# Patient Record
Sex: FEMALE | Race: WHITE | NOT HISPANIC OR LATINO | Employment: OTHER | URBAN - NONMETROPOLITAN AREA
[De-identification: names, ages, dates, MRNs, and addresses within clinical notes are randomized per-mention and may not be internally consistent; named-entity substitution may affect disease eponyms.]

---

## 2017-10-16 ENCOUNTER — TELEPHONE (OUTPATIENT)
Dept: CARDIOLOGY | Facility: CLINIC | Age: 57
End: 2017-10-16

## 2017-10-16 NOTE — TELEPHONE ENCOUNTER
Cardiac clearance req was received from the office of [facility]. Clearance was reviewed and signed and faxed back @ 0508 PM. -NAMRATA; JEREMIAS

## 2018-06-26 ENCOUNTER — OFFICE VISIT (OUTPATIENT)
Dept: CARDIAC SURGERY | Facility: CLINIC | Age: 58
End: 2018-06-26

## 2018-06-26 VITALS
BODY MASS INDEX: 28.64 KG/M2 | DIASTOLIC BLOOD PRESSURE: 63 MMHG | HEART RATE: 82 BPM | SYSTOLIC BLOOD PRESSURE: 148 MMHG | HEIGHT: 66 IN | OXYGEN SATURATION: 89 % | WEIGHT: 178.2 LBS

## 2018-06-26 DIAGNOSIS — I65.23 BILATERAL CAROTID ARTERY STENOSIS: Primary | ICD-10-CM

## 2018-06-26 PROCEDURE — 99203 OFFICE O/P NEW LOW 30 MIN: CPT | Performed by: THORACIC SURGERY (CARDIOTHORACIC VASCULAR SURGERY)

## 2019-06-11 ENCOUNTER — TELEPHONE (OUTPATIENT)
Dept: CARDIAC SURGERY | Facility: CLINIC | Age: 59
End: 2019-06-11

## 2019-06-11 NOTE — TELEPHONE ENCOUNTER
PT RECEIVED RECALL LETTER FOR 1 YR F/U WITH [facility] W/CAROTID DUPLEX. REQUESTED TO BE SEEN IN [place].

## 2019-06-18 DIAGNOSIS — I65.23 CAROTID STENOSIS, ASYMPTOMATIC, BILATERAL: Primary | ICD-10-CM

## 2019-07-23 ENCOUNTER — OFFICE VISIT (OUTPATIENT)
Dept: CARDIOLOGY | Facility: CLINIC | Age: 59
End: 2019-07-23

## 2019-07-23 VITALS
HEIGHT: 66 IN | BODY MASS INDEX: 28.64 KG/M2 | OXYGEN SATURATION: 94 % | SYSTOLIC BLOOD PRESSURE: 129 MMHG | DIASTOLIC BLOOD PRESSURE: 57 MMHG | HEART RATE: 78 BPM | WEIGHT: 178.2 LBS

## 2019-07-23 DIAGNOSIS — R07.9 CHEST PAIN, UNSPECIFIED TYPE: Primary | ICD-10-CM

## 2019-07-23 DIAGNOSIS — I25.10 CORONARY ARTERY DISEASE INVOLVING NATIVE CORONARY ARTERY OF NATIVE HEART WITHOUT ANGINA PECTORIS: ICD-10-CM

## 2019-07-23 DIAGNOSIS — R06.02 SOB (SHORTNESS OF BREATH): ICD-10-CM

## 2019-07-23 DIAGNOSIS — R00.2 PALPITATIONS: ICD-10-CM

## 2019-07-23 PROCEDURE — 99214 OFFICE O/P EST MOD 30 MIN: CPT | Performed by: PHYSICIAN ASSISTANT

## 2019-07-23 PROCEDURE — 93000 ELECTROCARDIOGRAM COMPLETE: CPT | Performed by: PHYSICIAN ASSISTANT

## 2019-07-23 RX ORDER — ALBUTEROL SULFATE 90 UG/1
2 AEROSOL, METERED RESPIRATORY (INHALATION) EVERY 6 HOURS PRN
COMMUNITY
Start: 2019-07-19

## 2019-07-23 NOTE — PROGRESS NOTES
Problem list     Subjective   Ira Stinson is a 59 y.o. female     Chief Complaint   Patient presents with   • Chest Pain     here for follow up   • Palpitations   • Shortness of Breath   • Edema       HPI        Problem List:  1. Coronary artery disease   1.1 History of CABG with follow-up stenting.  1.2 Recent catheterization, May 2014 with PTCA for IntraStent restenosis in the free radial to the RCA.  1.3. Repeat catheterization in July 2014 due to new 70-80% stenosis with angioplasty, subsequent dissection and stenting. Recommendations to consider single vessel bypass due to high grade stenosis  1.4 coronary artery bypass grafting with LIMA to LAD, vein graft to right coronary artery, vein graft to circumflex. Follow-up catheterization in 2014 demonstrated an occluded vein graft to RCA with stenting of the right coronary artery.  1.4 stress test September 2016 demonstrated posterolateral ischemia and preserved LV function  1.5 follow-up catheterization in November 2016 demonstrated high-grade disease involving the vein graft to the circumflex as well as the arterial graft to the PDA. There was high-grade disease of the native right coronary artery. Patient underwent stenting of the vein graft to circumflex, PTCA and stenting of the vein graft to the right coronary artery and stenting of the native right coronary artery.  2. Peripheral vascular disease with history of right carotid endarterectomy and follow-up right endarterectomy, March 2011.  2.1 The patient has residual left carotid stenosis, which was felt nonobstructive and not severe enough for mechanical intervention.  3. Chronic palpitations with previous monitoring indicating short-lived episodes of SVT and frequent PVCs.  4. Hypertension  5. Dyslipidemia  6. Hypothyroidism  7.  Mild aortic stenosis by echocardiogram 2016      Patient is a 59-year-old female who presents back to the office for follow-up.  We have not seen her in approximately 2 years.   Patient describes having chest pain that is occurred over the last several weeks.  She has felt a tightness and pressure.  She has started to develop symptoms similar to what she felt with previous stenting.  She describes carrying in groceries and she will become short of breath and feel discomfort.  She has progressive shortness of breath as described.  Her functional status has declined.  She denies PND orthopnea.    She will palpitate on occasion but no dizziness presyncope or syncope.  Otherwise is doing well    Outpatient Encounter Medications as of 7/23/2019   Medication Sig Dispense Refill   • ALPRAZolam (XANAX) 0.5 MG tablet Take  by mouth 2 (Two) Times a Day.     • atorvastatin (LIPITOR) 80 MG tablet Take  by mouth.     • budesonide-formoterol (SYMBICORT) 160-4.5 MCG/ACT inhaler Symbicort 160-4.5 MCG/ACT Inhalation Aerosol; Patient Sig: Symbicort 160-4.5 MCG/ACT Inhalation Aerosol 2 puffs daily; 1; 3; 12-Mar-2014; Active     • Cholecalciferol (VITAMIN D) 1000 UNITS tablet Take  by mouth daily.     • clopidogrel (PLAVIX) 75 MG tablet Take  by mouth daily.     • DULoxetine (CYMBALTA) 60 MG capsule Take  by mouth daily.     • ezetimibe (ZETIA) 10 MG tablet Take  by mouth.     • folic acid (FOLVITE) 800 MCG tablet Take  by mouth daily.     • furosemide (LASIX) 20 MG tablet Take  by mouth.     • isosorbide mononitrate (IMDUR) 30 MG 24 hr tablet Take 45 mg by mouth Daily.     • levothyroxine (SYNTHROID, LEVOTHROID) 25 MCG tablet Take  by mouth daily.     • metFORMIN (GLUCOPHAGE) 500 MG tablet Take 500 mg by mouth Daily With Breakfast.     • metoprolol succinate XL (TOPROL-XL) 50 MG 24 hr tablet Take  by mouth 2 (Two) Times a Day.     • nitroglycerin (NITROSTAT) 0.4 MG SL tablet Place  under the tongue.     • tiotropium (SPIRIVA HANDIHALER) 18 MCG per inhalation capsule daily.     • VENTOLIN  (90 Base) MCG/ACT inhaler As Needed.     • [DISCONTINUED] diphenhydrAMINE (BENADRYL) 25 MG tablet Take 1 tablet by  mouth Take As Directed. Take BID the day before the cath and AM of the cath. 3 tablet 0   • [DISCONTINUED] famotidine (PEPCID) 20 MG tablet Take 1 tablet by mouth Take As Directed. Take BID the day before the cath and AM of the cath. 3 tablet 0     No facility-administered encounter medications on file as of 7/23/2019.        Codeine; Contrast dye; Lortab [hydrocodone-acetaminophen]; Other; Percocet [oxycodone-acetaminophen]; and Penicillins    Past Medical History:   Diagnosis Date   • Anxiety    • Autoimmune disorder (CMS/Formerly [facility])    • Carotid artery stenosis    • Chest pain    • Coronary artery disease    • Depression    • Diabetes (CMS/[facility])    • Hyperlipidemia    • Hypertension    • Hypothyroidism    • Palpitations     with previous monitoring indicating short-lived episodes of SVT and frequent PVCs.   • Peripheral vascular disease (CMS/[facility])     with history of right carotid endarterectomy and follow-up right endarterectomy, March 2011.   • PVC (premature ventricular contraction)    • PVD (peripheral vascular disease) (CMS/[facility])    • Shortness of breath    • SVT (supraventricular tachycardia) (CMS/Former[facility])        Social History     Socioeconomic History   • Marital status:      Spouse name: Not on file   • Number of children: 2   • Years of education: Not on file   • Highest education level: Not on file   Occupational History   • Occupation: Nursing     Comment: disabled   Tobacco Use   • Smoking status: Current Every Day Smoker     Packs/day: 1.00     Years: 45.00     Pack years: 45.00     Types: Cigarettes   • Smokeless tobacco: Never Used   Substance and Sexual Activity   • Alcohol use: No   • Drug use: No   Social History Narrative    The patient lives with her daughter and  in [place].       Family History   Problem Relation Age of Onset   • Coronary artery disease Other    • Hyperlipidemia Other    • Hypertension Other    • Cancer Other    • Heart disease Mother    • Heart disease Father   "      Review of Systems   Constitutional: Positive for fatigue.   HENT: Negative.    Eyes: Positive for visual disturbance.   Respiratory: Positive for apnea (cpap), chest tightness, shortness of breath (with daily activitry) and wheezing.    Cardiovascular: Positive for chest pain, palpitations and leg swelling.   Gastrointestinal: Negative.    Endocrine: Negative.    Genitourinary: Negative.    Musculoskeletal: Positive for arthralgias, back pain, myalgias and neck pain.   Skin: Negative.    Allergic/Immunologic: Positive for environmental allergies.   Neurological: Negative.    Hematological: Bruises/bleeds easily.   Psychiatric/Behavioral: Positive for agitation. The patient is nervous/anxious.    All other systems reviewed and are negative.      Objective   Vitals:    07/23/19 1044   BP: 129/57   BP Location: Left arm   Patient Position: Sitting   Pulse: 78   SpO2: 94%   Weight: 80.8 kg (178 lb 3.2 oz)   Height: 167.6 cm (66\")      /57 (BP Location: Left arm, Patient Position: Sitting)   Pulse 78   Ht 167.6 cm (66\")   Wt 80.8 kg (178 lb 3.2 oz)   SpO2 94%   BMI 28.76 kg/m²     Lab Results (most recent)     None          Physical Exam   Constitutional: She is oriented to person, place, and time. She appears well-developed and well-nourished. No distress.   HENT:   Head: Normocephalic and atraumatic.   Eyes: EOM are normal. Pupils are equal, round, and reactive to light.   Neck: No JVD present. Carotid bruit is present.   Cardiovascular: Normal rate, regular rhythm, normal heart sounds and intact distal pulses. Exam reveals no gallop and no friction rub.   No murmur heard.  Grade 2/6 systolic ejection murmur right upper sternal border  Systolic murmur left upper sternal border   Pulmonary/Chest: Effort normal and breath sounds normal. No respiratory distress. She has no wheezes. She has no rales. She exhibits no tenderness.   Abdominal: There is no guarding.   Musculoskeletal: Normal range of motion. " She exhibits no edema.   Neurological: She is alert and oriented to person, place, and time. No cranial nerve deficit.   Skin: Skin is warm and dry. No rash noted. No erythema. No pallor.   Psychiatric: She has a normal mood and affect. Her behavior is normal.   Nursing note and vitals reviewed.      Procedure     ECG 12 Lead  Date/Time: 7/23/2019 10:51 AM  Performed by: Hardik Victor PA  Authorized by: Hardik Victor PA   Comparison: not compared with previous ECG   Comments: EKG demonstrates sinus rhythm at 69 bpm with nonspecific IVCD.  Possible septal wall margin determined with PVCs and n nonspecific ST-T wave changes               Assessment/Plan     Problems Addressed this Visit        Cardiovascular and Mediastinum    Coronary artery disease    Relevant Orders    Adult Transthoracic Echo Complete W/ Cont if Necessary Per Protocol    Stress Test With Myocardial Perfusion One Day    Palpitations    Relevant Orders    ECG 12 Lead    Adult Transthoracic Echo Complete W/ Cont if Necessary Per Protocol    Stress Test With Myocardial Perfusion One Day       Respiratory    SOB (shortness of breath)    Relevant Orders    ECG 12 Lead    Adult Transthoracic Echo Complete W/ Cont if Necessary Per Protocol    Stress Test With Myocardial Perfusion One Day       Nervous and Auditory    Chest pain - Primary    Relevant Orders    ECG 12 Lead    Adult Transthoracic Echo Complete W/ Cont if Necessary Per Protocol    Stress Test With Myocardial Perfusion One Day          Recommendation  1.  Patient with complaints of chest pain and dyspnea with coronary disease and no evaluation in approximately 3 years.  I feel further evaluation is warranted and patient will be scheduled for ischemia assessment.  2.  Stress test for risk stratification.  3.  Echocardiogram to evaluate LV structure and function and reevaluate aortic stenosis.  4.  Carotid stenosis is followed by vascular surgery.  5.  We will see her back for  follow-up on above testing.  Any chest pain or result by nitroglycerin, she is to go to the ER.  She will follow with primary as scheduled           Ira Stinson is a current cigarettes and smokeless tobacco user.  She currently smokes 1 pack of cigarettes per day for a duration of 25 years. I have educated her on the risk of diseases from using tobacco products such as cancer, COPD and heart diease.     I advised her to quit and she is not willing to quit.    I spent <3 minutes counseling the patient.         Patient's Body mass index is 28.76 kg/m². BMI is within normal parameters. No follow-up required..       Electronically signed by:

## 2019-08-14 ENCOUNTER — APPOINTMENT (OUTPATIENT)
Dept: CARDIOLOGY | Facility: HOSPITAL | Age: 59
End: 2019-08-14

## 2019-08-19 ENCOUNTER — HOSPITAL ENCOUNTER (OUTPATIENT)
Dept: CARDIOLOGY | Facility: HOSPITAL | Age: 59
Discharge: HOME | End: 2019-08-19

## 2019-08-19 VITALS — BODY MASS INDEX: 28.63 KG/M2 | HEIGHT: 66 IN | WEIGHT: 178.13 LBS

## 2019-08-19 DIAGNOSIS — I25.10 CORONARY ARTERY DISEASE INVOLVING NATIVE CORONARY ARTERY OF NATIVE HEART WITHOUT ANGINA PECTORIS: ICD-10-CM

## 2019-08-19 DIAGNOSIS — R00.2 PALPITATIONS: ICD-10-CM

## 2019-08-19 DIAGNOSIS — R07.9 CHEST PAIN, UNSPECIFIED TYPE: ICD-10-CM

## 2019-08-19 DIAGNOSIS — R06.02 SOB (SHORTNESS OF BREATH): ICD-10-CM

## 2019-08-19 PROCEDURE — A9500 TC99M SESTAMIBI: HCPCS | Performed by: INTERNAL MEDICINE

## 2019-08-19 PROCEDURE — 0 TECHNETIUM SESTAMIBI: Performed by: INTERNAL MEDICINE

## 2019-08-19 PROCEDURE — 93306 TTE W/DOPPLER COMPLETE: CPT

## 2019-08-19 PROCEDURE — 93017 CV STRESS TEST TRACING ONLY: CPT

## 2019-08-19 PROCEDURE — 78452 HT MUSCLE IMAGE SPECT MULT: CPT

## 2019-08-19 PROCEDURE — 78452 HT MUSCLE IMAGE SPECT MULT: CPT | Performed by: INTERNAL MEDICINE

## 2019-08-19 PROCEDURE — 25010000002 REGADENOSON 0.4 MG/5ML SOLUTION: Performed by: INTERNAL MEDICINE

## 2019-08-19 PROCEDURE — 93306 TTE W/DOPPLER COMPLETE: CPT | Performed by: INTERNAL MEDICINE

## 2019-08-19 PROCEDURE — 93018 CV STRESS TEST I&R ONLY: CPT | Performed by: INTERNAL MEDICINE

## 2019-08-19 RX ADMIN — REGADENOSON 0.4 MG: 0.08 INJECTION, SOLUTION INTRAVENOUS at 13:50

## 2019-08-19 RX ADMIN — TECHNETIUM TC 99M SESTAMIBI 1 DOSE: 1 INJECTION INTRAVENOUS at 13:49

## 2019-08-19 RX ADMIN — TECHNETIUM TC 99M SESTAMIBI 1 DOSE: 1 INJECTION INTRAVENOUS at 13:50

## 2019-08-21 LAB
BH CV STRESS COMMENTS STAGE 1: NORMAL
BH CV STRESS DOSE REGADENOSON STAGE 1: 0.4
BH CV STRESS DURATION MIN STAGE 1: 0
BH CV STRESS DURATION SEC STAGE 1: 10
BH CV STRESS PROTOCOL 1: NORMAL
BH CV STRESS RECOVERY BP: NORMAL MMHG
BH CV STRESS RECOVERY HR: 77 BPM
BH CV STRESS STAGE 1: 1
MAXIMAL PREDICTED HEART RATE: 159 BPM
PERCENT MAX PREDICTED HR: 50.94 %
STRESS BASELINE BP: NORMAL MMHG
STRESS BASELINE HR: 68 BPM
STRESS PERCENT HR: 60 %
STRESS POST PEAK BP: NORMAL MMHG
STRESS POST PEAK HR: 81 BPM
STRESS TARGET HR: 135 BPM

## 2019-08-26 ENCOUNTER — TELEPHONE (OUTPATIENT)
Dept: CARDIOLOGY | Facility: CLINIC | Age: 59
End: 2019-08-26

## 2019-08-26 LAB
BH CV ECHO MEAS - ACS: 1.5 CM
BH CV ECHO MEAS - AI DEC SLOPE: 188.1 CM/SEC^2
BH CV ECHO MEAS - AI DEC TIME: 1.2 SEC
BH CV ECHO MEAS - AI MAX PG: 21.9 MMHG
BH CV ECHO MEAS - AI MAX VEL: 233.9 CM/SEC
BH CV ECHO MEAS - AI P1/2T: 364.2 MSEC
BH CV ECHO MEAS - AO MAX PG (FULL): 16.5 MMHG
BH CV ECHO MEAS - AO MAX PG: 21.8 MMHG
BH CV ECHO MEAS - AO MEAN PG (FULL): 9.4 MMHG
BH CV ECHO MEAS - AO MEAN PG: 11.9 MMHG
BH CV ECHO MEAS - AO ROOT AREA (BSA CORRECTED): 1.9
BH CV ECHO MEAS - AO ROOT AREA: 10.4 CM^2
BH CV ECHO MEAS - AO ROOT DIAM: 3.6 CM
BH CV ECHO MEAS - AO V2 MAX: 233.6 CM/SEC
BH CV ECHO MEAS - AO V2 MEAN: 159 CM/SEC
BH CV ECHO MEAS - AO V2 VTI: 51.7 CM
BH CV ECHO MEAS - AVA(I,A): 1.5 CM^2
BH CV ECHO MEAS - AVA(I,D): 1.5 CM^2
BH CV ECHO MEAS - AVA(V,A): 1.5 CM^2
BH CV ECHO MEAS - AVA(V,D): 1.5 CM^2
BH CV ECHO MEAS - BSA(HAYCOCK): 2 M^2
BH CV ECHO MEAS - BSA: 1.9 M^2
BH CV ECHO MEAS - BZI_BMI: 28.7 KILOGRAMS/M^2
BH CV ECHO MEAS - BZI_METRIC_HEIGHT: 167.6 CM
BH CV ECHO MEAS - BZI_METRIC_WEIGHT: 80.7 KG
BH CV ECHO MEAS - CI(CUBED): 7.6 L/MIN/M^2
BH CV ECHO MEAS - CI(TEICH): 6.6 L/MIN/M^2
BH CV ECHO MEAS - CO(CUBED): 14.5 L/MIN
BH CV ECHO MEAS - CO(TEICH): 12.6 L/MIN
BH CV ECHO MEAS - EDV(CUBED): 112.1 ML
BH CV ECHO MEAS - EDV(TEICH): 108.7 ML
BH CV ECHO MEAS - EF(CUBED): 75.5 %
BH CV ECHO MEAS - EF(TEICH): 67.4 %
BH CV ECHO MEAS - ESV(CUBED): 27.4 ML
BH CV ECHO MEAS - ESV(TEICH): 35.5 ML
BH CV ECHO MEAS - FS: 37.5 %
BH CV ECHO MEAS - IVS/LVPW: 1.2
BH CV ECHO MEAS - IVSD: 1.3 CM
BH CV ECHO MEAS - LA DIMENSION(2D): 4.9 CM
BH CV ECHO MEAS - LA DIMENSION: 4.4 CM
BH CV ECHO MEAS - LA/AO: 1.2
BH CV ECHO MEAS - LAT PEAK E' VEL: 7 CM/SEC
BH CV ECHO MEAS - LV IVRT: 0.11 SEC
BH CV ECHO MEAS - LV MASS(C)D: 220.4 GRAMS
BH CV ECHO MEAS - LV MASS(C)DI: 115.8 GRAMS/M^2
BH CV ECHO MEAS - LV MAX PG: 5.3 MMHG
BH CV ECHO MEAS - LV MEAN PG: 2.4 MMHG
BH CV ECHO MEAS - LV V1 MAX: 115.2 CM/SEC
BH CV ECHO MEAS - LV V1 MEAN: 71.3 CM/SEC
BH CV ECHO MEAS - LV V1 VTI: 25.2 CM
BH CV ECHO MEAS - LVIDD: 4.8 CM
BH CV ECHO MEAS - LVIDS: 3 CM
BH CV ECHO MEAS - LVOT AREA: 3.1 CM^2
BH CV ECHO MEAS - LVOT DIAM: 2 CM
BH CV ECHO MEAS - LVPWD: 1.1 CM
BH CV ECHO MEAS - MED PEAK E' VEL: 4 CM/SEC
BH CV ECHO MEAS - MITRAL HR: 123.9 BPM
BH CV ECHO MEAS - MITRAL R-R: 0.48 SEC
BH CV ECHO MEAS - MM HR: 171.7 BPM
BH CV ECHO MEAS - MM R-R INT: 0.35 SEC
BH CV ECHO MEAS - MR MAX PG: 45.5 MMHG
BH CV ECHO MEAS - MR MAX VEL: 337.4 CM/SEC
BH CV ECHO MEAS - MV A MAX VEL: 91.1 CM/SEC
BH CV ECHO MEAS - MV DEC SLOPE: 538.6 CM/SEC^2
BH CV ECHO MEAS - MV DEC TIME: 0.18 SEC
BH CV ECHO MEAS - MV E MAX VEL: 98.1 CM/SEC
BH CV ECHO MEAS - MV E/A: 1.1
BH CV ECHO MEAS - MV MAX PG: 4 MMHG
BH CV ECHO MEAS - MV MEAN PG: 1.8 MMHG
BH CV ECHO MEAS - MV V2 MAX: 99.8 CM/SEC
BH CV ECHO MEAS - MV V2 MEAN: 63.4 CM/SEC
BH CV ECHO MEAS - MV V2 VTI: 30.7 CM
BH CV ECHO MEAS - MVA(VTI): 2.6 CM^2
BH CV ECHO MEAS - PA MAX PG (FULL): 1 MMHG
BH CV ECHO MEAS - PA MAX PG: 4.9 MMHG
BH CV ECHO MEAS - PA MEAN PG (FULL): 0.8 MMHG
BH CV ECHO MEAS - PA MEAN PG: 2.9 MMHG
BH CV ECHO MEAS - PA V2 MAX: 111 CM/SEC
BH CV ECHO MEAS - PA V2 MEAN: 80.3 CM/SEC
BH CV ECHO MEAS - PA V2 VTI: 22.8 CM
BH CV ECHO MEAS - PI END-D VEL: 163.1 CM/SEC
BH CV ECHO MEAS - PULM. HR: 211.5 BPM
BH CV ECHO MEAS - PULM. R-R: 0.28 SEC
BH CV ECHO MEAS - RAP SYSTOLE: 10 MMHG
BH CV ECHO MEAS - RV MAX PG: 3.9 MMHG
BH CV ECHO MEAS - RV MEAN PG: 2.1 MMHG
BH CV ECHO MEAS - RV V1 MAX: 98.7 CM/SEC
BH CV ECHO MEAS - RV V1 MEAN: 68.1 CM/SEC
BH CV ECHO MEAS - RV V1 VTI: 22.8 CM
BH CV ECHO MEAS - RVDD: 3.6 CM
BH CV ECHO MEAS - RVSP: 56 MMHG
BH CV ECHO MEAS - SI(AO): 281.7 ML/M^2
BH CV ECHO MEAS - SI(CUBED): 44.5 ML/M^2
BH CV ECHO MEAS - SI(LVOT): 41.4 ML/M^2
BH CV ECHO MEAS - SI(TEICH): 38.5 ML/M^2
BH CV ECHO MEAS - SV(AO): 536.3 ML
BH CV ECHO MEAS - SV(CUBED): 84.7 ML
BH CV ECHO MEAS - SV(LVOT): 78.7 ML
BH CV ECHO MEAS - SV(TEICH): 73.2 ML
BH CV ECHO MEAS - TR MAX VEL: 292.2 CM/SEC
BH CV ECHO MEASUREMENTS AVERAGE E/E' RATIO: 17.84
MAXIMAL PREDICTED HEART RATE: 159 BPM
STRESS TARGET HR: 135 BPM

## 2019-08-26 NOTE — TELEPHONE ENCOUNTER
Phones out. Echo to be addressed on follow up scheduled 9/4/19.    ----- Message from JANNA Davies sent at 8/22/2019  3:39 PM EDT -----  1-2 month follow up

## 2019-08-27 ENCOUNTER — TELEPHONE (OUTPATIENT)
Dept: CARDIOLOGY | Facility: CLINIC | Age: 59
End: 2019-08-27

## 2019-08-27 NOTE — TELEPHONE ENCOUNTER
Echo to be addressed on follow up appointment scheduled for 9/4/19. Dina Astorga MA         ----- Message from JANNA Davies sent at 8/27/2019  2:24 PM EDT -----  Routine errol solorio

## 2019-09-04 ENCOUNTER — OFFICE VISIT (OUTPATIENT)
Dept: CARDIOLOGY | Facility: CLINIC | Age: 59
End: 2019-09-04

## 2019-09-04 VITALS
WEIGHT: 176 LBS | HEART RATE: 82 BPM | BODY MASS INDEX: 28.28 KG/M2 | DIASTOLIC BLOOD PRESSURE: 62 MMHG | HEIGHT: 66 IN | SYSTOLIC BLOOD PRESSURE: 112 MMHG | RESPIRATION RATE: 16 BRPM | OXYGEN SATURATION: 91 %

## 2019-09-04 DIAGNOSIS — R07.9 CHEST PAIN, UNSPECIFIED TYPE: ICD-10-CM

## 2019-09-04 DIAGNOSIS — I25.10 CORONARY ARTERY DISEASE INVOLVING NATIVE CORONARY ARTERY OF NATIVE HEART WITHOUT ANGINA PECTORIS: ICD-10-CM

## 2019-09-04 DIAGNOSIS — R06.02 SHORTNESS OF BREATH: Primary | ICD-10-CM

## 2019-09-04 PROCEDURE — 99214 OFFICE O/P EST MOD 30 MIN: CPT | Performed by: PHYSICIAN ASSISTANT

## 2019-09-04 RX ORDER — DIPHENHYDRAMINE HCL 25 MG
TABLET ORAL
Qty: 3 TABLET | Refills: 0 | Status: SHIPPED | OUTPATIENT
Start: 2019-09-04 | End: 2019-10-28

## 2019-09-04 RX ORDER — FAMOTIDINE 20 MG/1
TABLET, FILM COATED ORAL
Qty: 3 TABLET | Refills: 0 | Status: SHIPPED | OUTPATIENT
Start: 2019-09-04 | End: 2019-10-28

## 2019-09-04 RX ORDER — PREDNISONE 20 MG/1
TABLET ORAL
Qty: 3 TABLET | Refills: 0 | Status: SHIPPED | OUTPATIENT
Start: 2019-09-04 | End: 2019-10-28

## 2019-09-04 RX ORDER — METOPROLOL SUCCINATE 50 MG/1
50 TABLET, EXTENDED RELEASE ORAL DAILY
Qty: 30 TABLET | Refills: 6 | Status: SHIPPED | OUTPATIENT
Start: 2019-09-04 | End: 2019-11-27

## 2019-09-04 NOTE — PATIENT INSTRUCTIONS
Steps to Quit Smoking    Smoking tobacco can be harmful to your health and can affect almost every organ in your body. Smoking puts you, and those around you, at risk for developing many serious chronic diseases. Quitting smoking is difficult, but it is one of the best things that you can do for your health. It is never too late to quit.  What are the benefits of quitting smoking?  When you quit smoking, you lower your risk of developing serious diseases and conditions, such as:  · Lung cancer or lung disease, such as COPD.  · Heart disease.  · Stroke.  · Heart attack.  · Infertility.  · Osteoporosis and bone fractures.  Additionally, symptoms such as coughing, wheezing, and shortness of breath may get better when you quit. You may also find that you get sick less often because your body is stronger at fighting off colds and infections. If you are pregnant, quitting smoking can help to reduce your chances of having a baby of low birth weight.  How do I get ready to quit?  When you decide to quit smoking, create a plan to make sure that you are successful. Before you quit:  · Pick a date to quit. Set a date within the next two weeks to give you time to prepare.  · Write down the reasons why you are quitting. Keep this list in places where you will see it often, such as on your bathroom mirror or in your car or wallet.  · Identify the people, places, things, and activities that make you want to smoke (triggers) and avoid them. Make sure to take these actions:  ? Throw away all cigarettes at home, at work, and in your car.  ? Throw away smoking accessories, such as ashtrays and lighters.  ? Clean your car and make sure to empty the ashtray.  ? Clean your home, including curtains and carpets.  · Tell your family, friends, and coworkers that you are quitting. Support from your loved ones can make quitting easier.  · Talk with your health care provider about your options for quitting smoking.  · Find out what treatment  options are covered by your health insurance.  What strategies can I use to quit smoking?  Talk with your healthcare provider about different strategies to quit smoking. Some strategies include:  · Quitting smoking altogether instead of gradually lessening how much you smoke over a period of time. Research shows that quitting “cold turkey” is more successful than gradually quitting.  · Attending in-person counseling to help you build problem-solving skills. You are more likely to have success in quitting if you attend several counseling sessions. Even short sessions of 10 minutes can be effective.  · Finding resources and support systems that can help you to quit smoking and remain smoke-free after you quit. These resources are most helpful when you use them often. They can include:  ? Online chats with a counselor.  ? Telephone quitlines.  ? Printed self-help materials.  ? Support groups or group counseling.  ? Text messaging programs.  ? Mobile phone applications.  · Taking medicines to help you quit smoking. (If you are pregnant or breastfeeding, talk with your health care provider first.) Some medicines contain nicotine and some do not. Both types of medicines help with cravings, but the medicines that include nicotine help to relieve withdrawal symptoms. Your health care provider may recommend:  ? Nicotine patches, gum, or lozenges.  ? Nicotine inhalers or sprays.  ? Non-nicotine medicine that is taken by mouth.  Talk with your health care provider about combining strategies, such as taking medicines while you are also receiving in-person counseling. Using these two strategies together makes you more likely to succeed in quitting than if you used either strategy on its own.  If you are pregnant or breastfeeding, talk with your health care provider about finding counseling or other support strategies to quit smoking. Do not take medicine to help you quit smoking unless told to do so by your health care  provider.  What things can I do to make it easier to quit?  Quitting smoking might feel overwhelming at first, but there is a lot that you can do to make it easier. Take these important actions:  · Reach out to your family and friends and ask that they support and encourage you during this time. Call telephone quitlines, reach out to support groups, or work with a counselor for support.  · Ask people who smoke to avoid smoking around you.  · Avoid places that trigger you to smoke, such as bars, parties, or smoke-break areas at work.  · Spend time around people who do not smoke.  · Lessen stress in your life, because stress can be a smoking trigger for some people. To lessen stress, try:  ? Exercising regularly.  ? Deep-breathing exercises.  ? Yoga.  ? Meditating.  ? Performing a body scan. This involves closing your eyes, scanning your body from head to toe, and noticing which parts of your body are particularly tense. Purposefully relax the muscles in those areas.  · Download or purchase mobile phone or tablet apps (applications) that can help you stick to your quit plan by providing reminders, tips, and encouragement. There are many free apps, such as QuitGuide from the CDC (Centers for Disease Control and Prevention). You can find other support for quitting smoking (smoking cessation) through smokefree.gov and other websites.  How will I feel when I quit smoking?  Within the first 24 hours of quitting smoking, you may start to feel some withdrawal symptoms. These symptoms are usually most noticeable 2-3 days after quitting, but they usually do not last beyond 2-3 weeks. Changes or symptoms that you might experience include:  · Mood swings.  · Restlessness, anxiety, or irritation.  · Difficulty concentrating.  · Dizziness.  · Strong cravings for sugary foods in addition to nicotine.  · Mild weight gain.  · Constipation.  · Nausea.  · Coughing or a sore throat.  · Changes in how your medicines work in your  body.  · A depressed mood.  · Difficulty sleeping (insomnia).  After the first 2-3 weeks of quitting, you may start to notice more positive results, such as:  · Improved sense of smell and taste.  · Decreased coughing and sore throat.  · Slower heart rate.  · Lower blood pressure.  · Clearer skin.  · The ability to breathe more easily.  · Fewer sick days.  Quitting smoking is very challenging for most people. Do not get discouraged if you are not successful the first time. Some people need to make many attempts to quit before they achieve long-term success. Do your best to stick to your quit plan, and talk with your health care provider if you have any questions or concerns.  This information is not intended to replace advice given to you by your health care provider. Make sure you discuss any questions you have with your health care provider.  Document Released: 12/12/2002 Document Revised: 07/24/2018 Document Reviewed: 05/03/2016  [employer] Interactive Patient Education © 2019 [employer]  Fat and Cholesterol Restricted Eating Plan  Getting too much fat and cholesterol in your diet may cause health problems. Choosing the right foods helps keep your fat and cholesterol at normal levels. This can keep you from getting certain diseases.  Your doctor may recommend an eating plan that includes:  · Total fat: ______% or less of total calories a day.  · Saturated fat: ______% or less of total calories a day.  · Cholesterol: less than _________mg a day.  · Fiber: ______g a day.  What are tips for following this plan?  General tips    · Work with your doctor to lose weight if you need to.  · Avoid:  ? Foods with added sugar.  ? Fried foods.  ? Foods with partially hydrogenated oils.  · Limit alcohol intake to no more than 1 drink a day for nonpregnant women and 2 drinks a day for men. One drink equals 12 oz of beer, 5 oz of wine, or 1½ oz of hard liquor.  Reading food labels  · Check food labels for:  ? Trans  "fats.  ? Partially hydrogenated oils.  ? Saturated fat (g) in each serving.  ? Cholesterol (mg) in each serving.  ? Fiber (g) in each serving.  · Choose foods with healthy fats, such as:  ? Monounsaturated fats.  ? Polyunsaturated fats.  ? Omega-3 fats.  · Choose grain products that have whole grains. Look for the word \"whole\" as the first word in the ingredient list.  Cooking  · Cook foods using low-fat methods. These include baking, boiling, grilling, and broiling.  · Eat more home-cooked foods. Eat at restaurants and buffets less often.  · Avoid cooking using saturated fats, such as butter, cream, palm oil, palm kernel oil, and coconut oil.  Meal planning    · At meals, divide your plate into four equal parts:  ? Fill one-half of your plate with vegetables and green salads.  ? Fill one-fourth of your plate with whole grains.  ? Fill one-fourth of your plate with low-fat (lean) protein foods.  · Eat fish that is high in omega-3 fats at least two times a week. This includes mackerel, tuna, sardines, and salmon.  · Eat foods that are high in fiber, such as whole grains, beans, apples, broccoli, carrots, peas, and barley.  Recommended foods  Grains  · Whole grains, such as whole wheat or whole grain breads, crackers, cereals, and pasta. Unsweetened oatmeal, bulgur, barley, quinoa, or brown rice. Corn or whole wheat flour tortillas.  Vegetables  · Fresh or frozen vegetables (raw, steamed, roasted, or grilled). Green salads.  Fruits  · All fresh, canned (in natural juice), or frozen fruits.  Meats and other protein foods  · Ground beef (85% or leaner), grass-fed beef, or beef trimmed of fat. Skinless chicken or turkey. Ground chicken or turkey. Pork trimmed of fat. All fish and seafood. Egg whites. Dried beans, peas, or lentils. Unsalted nuts or seeds. Unsalted canned beans. Nut butters without added sugar or oil.  Dairy  · Low-fat or nonfat dairy products, such as skim or 1% milk, 2% or reduced-fat cheeses, low-fat " and fat-free ricotta or cottage cheese, or plain low-fat and nonfat yogurt.  Fats and oils  · Tub margarine without trans fats. Light or reduced-fat mayonnaise and salad dressings. Avocado. Olive, canola, sesame, or safflower oils.  The items listed above may not be a complete list of recommended foods or beverages. Contact your dietitian for more options.  The items listed above may not be a complete list of foods and beverages [you/your child] can eat. Contact a dietitian for more information.  Foods to avoid  Grains  · White bread. White pasta. White rice. Cornbread. Bagels, pastries, and croissants. Crackers and snack foods that contain trans fat and hydrogenated oils.  Vegetables  · Vegetables cooked in cheese, cream, or butter sauce. Fried vegetables.  Fruits  · Canned fruit in heavy syrup. Fruit in cream or butter sauce. Fried fruit.  Meats and other protein foods  · Fatty cuts of meat. Ribs, chicken wings, clifford, sausage, bologna, salami, chitterlings, fatback, hot dogs, bratwurst, and packaged lunch meats. Liver and organ meats. Whole eggs and egg yolks. Chicken and turkey with skin. Fried meat.  Dairy  · Whole or 2% milk, cream, half-and-half, and cream cheese. Whole milk cheeses. Whole-fat or sweetened yogurt. Full-fat cheeses. Nondairy creamers and whipped toppings. Processed cheese, cheese spreads, and cheese curds.  Beverages  · Alcohol. Sugar-sweetened drinks such as sodas, lemonade, and fruit drinks.  Fats and oils  · Butter, stick margarine, lard, shortening, ghee, or clifford fat. Coconut, palm kernel, and palm oils.  Sweets and desserts  · Corn syrup, sugars, honey, and molasses. Candy. Jam and jelly. Syrup. Sweetened cereals. Cookies, pies, cakes, donuts, muffins, and ice cream.  The items listed above may not be a complete list of foods and beverages to avoid. Contact your dietitian for more information.  The items listed above may not be a complete list of foods and beverages [you/your child]  "should avoid. Contact a dietitian for more information.  Summary  · Choosing the right foods helps keep your fat and cholesterol at normal levels. This can keep you from getting certain diseases.  · At meals, fill one-half of your plate with vegetables and green salads.  · Eat high-fiber foods, like whole grains, beans, apples, carrots, peas, and barley.  · Limit added sugar, saturated fats, alcohol, and fried foods.  This information is not intended to replace advice given to you by your health care provider. Make sure you discuss any questions you have with your health care provider.  Document Released: 06/18/2013 Document Revised: 09/04/2018 Document Reviewed: 09/04/2018  [employer] Interactive Patient Education © 2019 [employer]  BMI for Adults    Body mass index (BMI) is a number that is calculated from a person's weight and height. BMI may help to estimate how much of a person's weight is composed of fat. BMI can help identify those who may be at higher risk for certain medical problems.  How is BMI used with adults?  BMI is used as a screening tool to identify possible weight problems. It is used to check whether a person is obese, overweight, healthy weight, or underweight.  How is BMI calculated?  BMI measures your weight and compares it to your height. This can be done either in English ([place] or metric measurements. Note that charts are available to help you find your BMI quickly and easily without having to do these calculations yourself.  To calculate your BMI in English ([place] measurements, your health care provider will:  1. Measure your weight in pounds (lb).  2. Multiply the number of pounds by 703.  ? For example, for a person who weighs 180 lb, multiply that number by 703, which equals 126,540.  3. Measure your height in inches (in). Then multiply that number by itself to get a measurement called \"inches squared.\"  ? For example, for a person who is 70 in tall, the \"inches squared\" measurement is " "70 in x 70 in, which equals 4900 inches squared.  4. Divide the total from Step 2 (number of lb x 703) by the total from Step 3 (inches squared): 126,540 ÷ 4900 = 25.8. This is your BMI.  To calculate your BMI in metric measurements, your health care provider will:  1. Measure your weight in kilograms (kg).  2. Measure your height in meters (m). Then multiply that number by itself to get a measurement called \"meters squared.\"  ? For example, for a person who is 1.75 m tall, the \"meters squared\" measurement is 1.75 m x 1.75 m, which is equal to 3.1 meters squared.  3. Divide the number of kilograms (your weight) by the meters squared number. In this example: 70 ÷ 3.1 = 22.6. This is your BMI.  How is BMI interpreted?  To interpret your results, your health care provider will use BMI charts to identify whether you are underweight, normal weight, overweight, or obese. The following guidelines will be used:  · Underweight: BMI less than 18.5.  · Normal weight: BMI between 18.5 and 24.9.  · Overweight: BMI between 25 and 29.9.  · Obese: BMI of 30 and above.  Please note:  · Weight includes both fat and muscle, so someone with a muscular build, such as an athlete, may have a BMI that is higher than 24.9. In cases like these, BMI is not an accurate measure of body fat.  · To determine if excess body fat is the cause of a BMI of 25 or higher, further assessments may need to be done by a health care provider.  · BMI is usually interpreted in the same way for men and women.  Why is BMI a useful tool?  BMI is useful in two ways:  · Identifying a weight problem that may be related to a medical condition, or that may increase the risk for medical problems.  · Promoting lifestyle and diet changes in order to reach a healthy weight.  Summary  · Body mass index (BMI) is a number that is calculated from a person's weight and height.  · BMI may help to estimate how much of a person's weight is composed of fat. BMI can help identify " those who may be at higher risk for certain medical problems.  · BMI can be measured using English measurements or metric measurements.  · To interpret your results, your health care provider will use BMI charts to identify whether you are underweight, normal weight, overweight, or obese.  This information is not intended to replace advice given to you by your health care provider. Make sure you discuss any questions you have with your health care provider.  Document Released: 08/29/2005 Document Revised: 10/31/2018 Document Reviewed: 10/31/2018  [employer] Interactive Patient Education © 2019 [employer]

## 2019-09-04 NOTE — PROGRESS NOTES
Problem list     Subjective   Ira Stinson is a 59 y.o. female     Chief Complaint   Patient presents with   • Chest Pain     Follow up test results   • Palpitations   • Shortness of Breath     relates to bronchitis and seasonal allergies       HPI    Problem List:  1. Coronary artery disease   1.1 History of CABG with follow-up stenting.  1.2 Recent catheterization, May 2014 with PTCA for IntraStent restenosis in the free radial to the RCA.  1.3. Repeat catheterization in July 2014 due to new 70-80% stenosis with angioplasty, subsequent dissection and stenting. Recommendations to consider single vessel bypass due to high grade stenosis  1.4 coronary artery bypass grafting with LIMA to LAD, vein graft to right coronary artery, vein graft to circumflex. Follow-up catheterization in 2014 demonstrated an occluded vein graft to RCA with stenting of the right coronary artery.  1.4 stress test September 2016 demonstrated posterolateral ischemia and preserved LV function  1.5 follow-up catheterization in November 2016 demonstrated high-grade disease involving the vein graft to the circumflex as well as the arterial graft to the PDA. There was high-grade disease of the native right coronary artery. Patient underwent stenting of the vein graft to circumflex, PTCA and stenting of the vein graft to the right coronary artery and stenting of the native right coronary artery.  1.6 stress test August 2019 suggest no evidence of ischemia preserved LV function  1.7 continued pain on antianginal therapy  2. Peripheral vascular disease with history of right carotid endarterectomy and follow-up right endarterectomy, March 2011.  2.1 The patient has residual left carotid stenosis, which was felt nonobstructive and not severe enough for mechanical intervention.  3. Chronic palpitations with previous monitoring indicating short-lived episodes of SVT and frequent PVCs.  4. Hypertension  5. Dyslipidemia  6. Hypothyroidism  7.  Mild aortic  stenosis by echocardiogram 2016      Patient is a 59-year-old female who presents back to the office for follow-up.  Patient has history of coronary disease.  She has history of coronary artery bypass grafting and last catheterization was in 2016 in which she underwent stenting of the vein graft to the circumflex, vein graft RCA and the RCA.  Patient has history of recurrent stenosis.  Recently she presented to the office complaining of significant chest and neck pain.  She had not been evaluated in quite some time and underwent testing here at the office which for the most part is been unremarkable.  However, she continues to have symptoms despite being on 2 antianginal medications.    Patient is very cognizant of her angina.  She describes feeling symptoms and would have catheterization and ended up having revascularization performed.  The pain that she is experiencing now is similar to what she felt back then.  She describes a squeezing sensation in her neck.  She describes a pressure in her chest.  This occurs at random and at rest.  This occurs despite being on 2 antianginal medications.  She has shortness of breath as well.  She notices this when trying to exert.  She has underlying lung disease.  She denies PND orthopnea.    She does not complain of any palpitations or dysrhythmic symptoms.and otherwise diong well.         Outpatient Encounter Medications as of 9/4/2019   Medication Sig Dispense Refill   • ALPRAZolam (XANAX) 0.5 MG tablet Take  by mouth 2 (Two) Times a Day.     • atorvastatin (LIPITOR) 80 MG tablet Take  by mouth.     • budesonide-formoterol (SYMBICORT) 160-4.5 MCG/ACT inhaler Symbicort 160-4.5 MCG/ACT Inhalation Aerosol; Patient Sig: Symbicort 160-4.5 MCG/ACT Inhalation Aerosol 2 puffs daily; 1; 3; 12-Mar-2014; Active     • Cholecalciferol (VITAMIN D) 1000 UNITS tablet Take  by mouth daily.     • clopidogrel (PLAVIX) 75 MG tablet Take  by mouth daily.     • DULoxetine (CYMBALTA) 60 MG  capsule Take  by mouth daily.     • ezetimibe (ZETIA) 10 MG tablet Take  by mouth.     • folic acid (FOLVITE) 800 MCG tablet Take  by mouth daily.     • furosemide (LASIX) 20 MG tablet Take  by mouth.     • isosorbide mononitrate (IMDUR) 30 MG 24 hr tablet Take 45 mg by mouth Daily.     • levothyroxine (SYNTHROID, LEVOTHROID) 25 MCG tablet Take  by mouth daily.     • metFORMIN (GLUCOPHAGE) 500 MG tablet Take 500 mg by mouth Daily With Breakfast.     • metoprolol succinate XL (TOPROL-XL) 50 MG 24 hr tablet Take 1 tablet by mouth Daily. 30 tablet 6   • nitroglycerin (NITROSTAT) 0.4 MG SL tablet Place  under the tongue.     • tiotropium (SPIRIVA HANDIHALER) 18 MCG per inhalation capsule daily.     • VENTOLIN  (90 Base) MCG/ACT inhaler As Needed.     • [DISCONTINUED] metoprolol succinate XL (TOPROL-XL) 50 MG 24 hr tablet Take  by mouth 2 (Two) Times a Day.     • aspirin 81 MG tablet Take 1 tablet by mouth Daily. 30 tablet 11     No facility-administered encounter medications on file as of 9/4/2019.        Codeine; Contrast dye; Lortab [hydrocodone-acetaminophen]; Other; Percocet [oxycodone-acetaminophen]; and Penicillins    Past Medical History:   Diagnosis Date   • Anxiety    • Autoimmune disorder (CMS/HCC)    • Carotid artery stenosis    • Chest pain    • Coronary artery disease    • Depression    • Diabetes (CMS/HCC)    • Hyperlipidemia    • Hypertension    • Hypothyroidism    • Palpitations     with previous monitoring indicating short-lived episodes of SVT and frequent PVCs.   • Peripheral vascular disease (CMS/HCC)     with history of right carotid endarterectomy and follow-up right endarterectomy, March 2011.   • PVC (premature ventricular contraction)    • PVD (peripheral vascular disease) (CMS/HCC)    • Shortness of breath    • SVT (supraventricular tachycardia) (CMS/HCC)        Social History     Socioeconomic History   • Marital status:      Spouse name: Not on file   • Number of children: 2   •  "Years of education: Not on file   • Highest education level: Not on file   Occupational History   • Occupation: Nursing     Comment: disabled   Tobacco Use   • Smoking status: Current Every Day Smoker     Packs/day: 0.25     Years: 45.00     Pack years: 11.25     Types: Cigarettes   • Smokeless tobacco: Never Used   Substance and Sexual Activity   • Alcohol use: No   • Drug use: No   • Sexual activity: Defer   Social History Narrative    The patient lives with her daughter and  in [place].       Family History   Problem Relation Age of Onset   • Coronary artery disease Other    • Hyperlipidemia Other    • Hypertension Other    • Cancer Other    • Heart disease Mother    • Heart disease Father        Review of Systems   Constitutional: Negative for activity change, appetite change and fatigue.   HENT: Negative.    Eyes: Negative for photophobia and visual disturbance.   Respiratory: Positive for shortness of breath (bronchitis, seasonal allergies). Negative for chest tightness.    Cardiovascular: Positive for chest pain. Negative for palpitations and leg swelling.   Gastrointestinal: Negative.    Endocrine: Negative for cold intolerance and heat intolerance.   Genitourinary: Negative.    Musculoskeletal: Positive for arthralgias, back pain, joint swelling and myalgias.   Skin: Negative for color change and rash.   Allergic/Immunologic: Negative for environmental allergies and food allergies.   Neurological: Negative for dizziness, syncope, weakness and light-headedness.   Hematological: Does not bruise/bleed easily.   Psychiatric/Behavioral: Negative.    All other systems reviewed and are negative.      Objective   Vitals:    09/04/19 1017   BP: 112/62   BP Location: Right arm   Patient Position: Sitting   Pulse: 82   Resp: 16   SpO2: 91%   Weight: 79.8 kg (176 lb)   Height: 167 cm (65.75\")      /62 (BP Location: Right arm, Patient Position: Sitting)   Pulse 82   Resp 16   Ht 167 cm (65.75\")   Wt " 79.8 kg (176 lb)   SpO2 91%   BMI 28.63 kg/m²     Lab Results (most recent)     None          Physical Exam   Constitutional: She is oriented to person, place, and time. She appears well-developed and well-nourished. No distress.   HENT:   Head: Normocephalic and atraumatic.   Eyes: EOM are normal. Pupils are equal, round, and reactive to light.   Neck: No JVD present.   Cardiovascular: Normal rate, regular rhythm, normal heart sounds and intact distal pulses. Exam reveals no gallop and no friction rub.   No murmur heard.  Pulmonary/Chest: Effort normal and breath sounds normal. No respiratory distress. She has no wheezes. She has no rales. She exhibits no tenderness.   Musculoskeletal: Normal range of motion. She exhibits no edema.   Neurological: She is alert and oriented to person, place, and time. No cranial nerve deficit.   Skin: Skin is warm and dry. No rash noted. No erythema. No pallor.   Psychiatric: She has a normal mood and affect. Her behavior is normal.   Nursing note and vitals reviewed.      Procedure   Procedures       Assessment/Plan     Problems Addressed this Visit        Cardiovascular and Mediastinum    Coronary artery disease    Relevant Medications    metoprolol succinate XL (TOPROL-XL) 50 MG 24 hr tablet    Other Relevant Orders    [facility]    CBC & Differential    Comprehensive Metabolic Panel       Respiratory    Shortness of breath - Primary    Relevant Orders    [facility]    CBC & Differential    Comprehensive Metabolic Panel       Nervous and Auditory    Chest pain    Relevant Orders    [facility]    CBC & Differential    Comprehensive Metabolic Panel            Recommendation  1.  Patient with continued symptoms despite antianginal therapy.  She is currently on 2 medications.  She describes a chest discomfort with a squeezing neck pain.  This is similar to what she felt with previous stenting.  Despite the fact that she had negative stress test, she  continues to have symptoms on medical therapy with history of coronary disease.  Therefore cardiac catheterization will be scheduled.  2.  Patient will be continued on medicines and will be placed on dual antiplatelet therapy.  Any chest pain not resolved with nitroglycerin, she is to go to the ER.  She will follow with primary as scheduled         Patient's Body mass index is 28.63 kg/m². BMI is above normal parameters. Recommendations include: educational material.       Electronically signed by:

## 2019-09-25 ENCOUNTER — TELEPHONE (OUTPATIENT)
Dept: CARDIOLOGY | Facility: CLINIC | Age: 59
End: 2019-09-25

## 2019-09-25 NOTE — TELEPHONE ENCOUNTER
Called patient regarding pre-cath labs. She stated she has not had them drawn yet however she will tomorrow. Dina Astorga MA

## 2019-09-26 ENCOUNTER — LAB (OUTPATIENT)
Dept: LAB | Facility: HOSPITAL | Age: 59
End: 2019-09-26

## 2019-09-26 DIAGNOSIS — R06.02 SHORTNESS OF BREATH: ICD-10-CM

## 2019-09-26 DIAGNOSIS — R07.9 CHEST PAIN, UNSPECIFIED TYPE: ICD-10-CM

## 2019-09-26 DIAGNOSIS — I25.10 CORONARY ARTERY DISEASE INVOLVING NATIVE CORONARY ARTERY OF NATIVE HEART WITHOUT ANGINA PECTORIS: ICD-10-CM

## 2019-09-26 PROCEDURE — 80053 COMPREHEN METABOLIC PANEL: CPT | Performed by: PHYSICIAN ASSISTANT

## 2019-09-26 PROCEDURE — 85025 COMPLETE CBC W/AUTO DIFF WBC: CPT | Performed by: PHYSICIAN ASSISTANT

## 2019-09-26 PROCEDURE — 36415 COLL VENOUS BLD VENIPUNCTURE: CPT

## 2019-09-27 LAB
ALBUMIN SERPL-MCNC: 4.44 G/DL (ref 3.5–5.2)
ALBUMIN/GLOB SERPL: 1.5 G/DL
ALP SERPL-CCNC: 80 U/L (ref 39–117)
ALT SERPL W P-5'-P-CCNC: 22 U/L (ref 1–33)
ANION GAP SERPL CALCULATED.3IONS-SCNC: 16.8 MMOL/L (ref 5–15)
AST SERPL-CCNC: 24 U/L (ref 1–32)
BASOPHILS # BLD AUTO: 0.06 10*3/MM3 (ref 0–0.2)
BASOPHILS NFR BLD AUTO: 0.7 % (ref 0–1.5)
BILIRUB SERPL-MCNC: 0.3 MG/DL (ref 0.2–1.2)
BUN BLD-MCNC: 12 MG/DL (ref 8–23)
BUN/CREAT SERPL: 20 (ref 7–25)
CALCIUM SPEC-SCNC: 9.8 MG/DL (ref 8.6–10.5)
CHLORIDE SERPL-SCNC: 97 MMOL/L (ref 98–107)
CO2 SERPL-SCNC: 25.2 MMOL/L (ref 22–29)
CREAT BLD-MCNC: 0.6 MG/DL (ref 0.57–1)
DEPRECATED RDW RBC AUTO: 59.6 FL (ref 37–54)
EOSINOPHIL # BLD AUTO: 0.3 10*3/MM3 (ref 0–0.4)
EOSINOPHIL NFR BLD AUTO: 3.5 % (ref 0.3–6.2)
ERYTHROCYTE [DISTWIDTH] IN BLOOD BY AUTOMATED COUNT: 18 % (ref 12.3–15.4)
GFR SERPL CREATININE-BSD FRML MDRD: 102 ML/MIN/1.73
GLOBULIN UR ELPH-MCNC: 3 GM/DL
GLUCOSE BLD-MCNC: 126 MG/DL (ref 65–99)
HCT VFR BLD AUTO: 42.6 % (ref 34–46.6)
HGB BLD-MCNC: 12.8 G/DL (ref 12–15.9)
IMM GRANULOCYTES # BLD AUTO: 0.02 10*3/MM3 (ref 0–0.05)
IMM GRANULOCYTES NFR BLD AUTO: 0.2 % (ref 0–0.5)
LYMPHOCYTES # BLD AUTO: 1.28 10*3/MM3 (ref 0.7–3.1)
LYMPHOCYTES NFR BLD AUTO: 14.8 % (ref 19.6–45.3)
MCH RBC QN AUTO: 27.5 PG (ref 26.6–33)
MCHC RBC AUTO-ENTMCNC: 30 G/DL (ref 31.5–35.7)
MCV RBC AUTO: 91.6 FL (ref 79–97)
MONOCYTES # BLD AUTO: 0.58 10*3/MM3 (ref 0.1–0.9)
MONOCYTES NFR BLD AUTO: 6.7 % (ref 5–12)
NEUTROPHILS # BLD AUTO: 6.41 10*3/MM3 (ref 1.7–7)
NEUTROPHILS NFR BLD AUTO: 74.1 % (ref 42.7–76)
NRBC BLD AUTO-RTO: 0 /100 WBC (ref 0–0.2)
PLATELET # BLD AUTO: 290 10*3/MM3 (ref 140–450)
PMV BLD AUTO: 12.5 FL (ref 6–12)
POTASSIUM BLD-SCNC: 4.8 MMOL/L (ref 3.5–5.2)
PROT SERPL-MCNC: 7.4 G/DL (ref 6–8.5)
RBC # BLD AUTO: 4.65 10*6/MM3 (ref 3.77–5.28)
SODIUM BLD-SCNC: 139 MMOL/L (ref 136–145)
WBC NRBC COR # BLD: 8.65 10*3/MM3 (ref 3.4–10.8)

## 2019-10-07 ENCOUNTER — OUTSIDE FACILITY SERVICE (OUTPATIENT)
Dept: CARDIOLOGY | Facility: CLINIC | Age: 59
End: 2019-10-07

## 2019-10-07 PROCEDURE — 92978 ENDOLUMINL IVUS OCT C 1ST: CPT | Performed by: INTERNAL MEDICINE

## 2019-10-07 PROCEDURE — 93459 L HRT ART/GRFT ANGIO: CPT | Performed by: INTERNAL MEDICINE

## 2019-10-07 PROCEDURE — 92937 PRQ TRLUML REVSC CAB GRF 1: CPT | Performed by: INTERNAL MEDICINE

## 2019-10-07 PROCEDURE — 92938 PR PRQ TRLUML CORONARY BYP GRFT REVASC ADDL VESSEL: CPT | Performed by: INTERNAL MEDICINE

## 2019-10-08 ENCOUNTER — OUTSIDE FACILITY SERVICE (OUTPATIENT)
Dept: CARDIOLOGY | Facility: CLINIC | Age: 59
End: 2019-10-08

## 2019-10-08 DIAGNOSIS — R06.02 SHORTNESS OF BREATH: ICD-10-CM

## 2019-10-08 DIAGNOSIS — R07.9 CHEST PAIN, UNSPECIFIED TYPE: ICD-10-CM

## 2019-10-08 DIAGNOSIS — I25.10 CORONARY ARTERY DISEASE INVOLVING NATIVE CORONARY ARTERY OF NATIVE HEART WITHOUT ANGINA PECTORIS: ICD-10-CM

## 2019-10-08 PROCEDURE — 99217 PR OBSERVATION CARE DISCHARGE MANAGEMENT: CPT | Performed by: NURSE PRACTITIONER

## 2019-10-09 ENCOUNTER — TELEPHONE (OUTPATIENT)
Dept: CARDIOLOGY | Facility: CLINIC | Age: 59
End: 2019-10-09

## 2019-10-09 NOTE — TELEPHONE ENCOUNTER
Patient called stating she is having active chest pain and pressure in the center of her chest starting this morning. She had [facility] on 10/7/19 with 2 stents. She has had previous stents and never experienced this feeling.  Verbal order per Hardik Victor PA-C for patient to proceed to the ER for evaluation. Patient verbalized understanding. Dina Astorga MA

## 2019-10-14 ENCOUNTER — TELEPHONE (OUTPATIENT)
Dept: CARDIOLOGY | Facility: CLINIC | Age: 59
End: 2019-10-14

## 2019-10-14 NOTE — TELEPHONE ENCOUNTER
Patient scheduled cath follow up on 10/28/19. Dina Astorga MA    ----- Message from JANNA Davies sent at 10/14/2019 11:31 AM EDT -----  1 to 2-week follow-up

## 2019-10-22 ENCOUNTER — OFFICE VISIT (OUTPATIENT)
Dept: CARDIAC SURGERY | Facility: CLINIC | Age: 59
End: 2019-10-22

## 2019-10-22 VITALS
DIASTOLIC BLOOD PRESSURE: 72 MMHG | SYSTOLIC BLOOD PRESSURE: 132 MMHG | BODY MASS INDEX: 27.94 KG/M2 | WEIGHT: 178 LBS | HEART RATE: 72 BPM | OXYGEN SATURATION: 98 % | HEIGHT: 67 IN

## 2019-10-22 DIAGNOSIS — I65.23 CAROTID STENOSIS, ASYMPTOMATIC, BILATERAL: Primary | ICD-10-CM

## 2019-10-22 PROCEDURE — 99212 OFFICE O/P EST SF 10 MIN: CPT | Performed by: PHYSICIAN ASSISTANT

## 2019-10-22 RX ORDER — TIOTROPIUM BROMIDE INHALATION SPRAY 3.12 UG/1
2 SPRAY, METERED RESPIRATORY (INHALATION)
Refills: 3 | COMMUNITY
Start: 2019-09-03 | End: 2022-04-26 | Stop reason: ALTCHOICE

## 2019-10-22 RX ORDER — LANSOPRAZOLE 15 MG/1
15 CAPSULE, DELAYED RELEASE ORAL DAILY
Refills: 2 | COMMUNITY
Start: 2019-08-17 | End: 2019-11-27

## 2019-10-22 RX ORDER — ALPRAZOLAM 1 MG/1
1 TABLET ORAL 2 TIMES DAILY
Refills: 1 | COMMUNITY
Start: 2019-10-16

## 2019-10-22 RX ORDER — DOXYCYCLINE 100 MG/1
100 CAPSULE ORAL 2 TIMES DAILY
Refills: 0 | COMMUNITY
Start: 2019-09-01 | End: 2019-10-28

## 2019-10-22 RX ORDER — ISOSORBIDE DINITRATE 30 MG/1
TABLET ORAL
Refills: 2 | COMMUNITY
Start: 2019-10-11 | End: 2020-08-06

## 2019-10-22 RX ORDER — METFORMIN HYDROCHLORIDE 500 MG/1
500 TABLET, EXTENDED RELEASE ORAL DAILY
Refills: 2 | COMMUNITY
Start: 2019-09-12

## 2019-10-22 NOTE — PROGRESS NOTES
10/22/2019  Patient Information  Ira Stinson                                                                                          [address] [place] [address]   1960  'PCP/Referring Physician'  Ernesto Whitfield MD  [phone]  No ref. provider found    Chief Complaint   Patient presents with   • Follow-up     1 yr. F/u w/ carotid duplex - carotid artery stenosis       History of Present Illness:  Mrs. Stinson is a 58-year-old female with a history of hypertension, hyperlipidemia, COPD, diabetes, coronary disease status post CABG x 3 and active tobacco abuse who presents to office today for 1 year follow-up of her known carotid artery disease.  She is status post right carotid endarterectomy with Dr. Sharif on 3/14/11.  Ms Stinson denies vision loss, speaking difficulty or weakness.  She has no new complaints today and is otherwise doing well.    Patient Active Problem List   Diagnosis   • Coronary artery disease   • Peripheral vascular disease (CMS/HCC)   • Palpitations   • Hypertension   • Hypothyroidism   • Dyslipidemia   • Anxiety   • Carotid artery stenosis   • Chest pain   • Depression   • PVC (premature ventricular contraction)   • SVT (supraventricular tachycardia) (CMS/HCC)   • Shortness of breath     Past Medical History:   Diagnosis Date   • Anxiety    • Autoimmune disorder (CMS/HCC)    • Carotid artery stenosis    • Chest pain    • COPD (chronic obstructive pulmonary disease) (CMS/HCC)    • Coronary artery disease    • Depression    • Diabetes (CMS/HCC)    • Hyperlipidemia    • Hypertension    • Hypothyroidism    • Palpitations     with previous monitoring indicating short-lived episodes of SVT and frequent PVCs.   • Peripheral vascular disease (CMS/HCC)     with history of right carotid endarterectomy and follow-up right endarterectomy, March 2011.   • PVC (premature ventricular contraction)    • PVD (peripheral vascular disease) (CMS/HCC)    • Shortness of breath    • SVT  (supraventricular tachycardia) (CMS/HCC)      Past Surgical History:   Procedure Laterality Date   • CAROTID ENDARTERECTOMY Right 03/14/2011    Dr. Sharif   • CATARACT EXTRACTION, BILATERAL     • CORONARY ARTERY BYPASS GRAFT      X3   • CORONARY STENT PLACEMENT     • HERNIA REPAIR      Umbilical Hernia Repair X2   • TUBAL ABDOMINAL LIGATION         Current Outpatient Medications:   •  ALPRAZolam (XANAX) 0.5 MG tablet, Take  by mouth 2 (Two) Times a Day., Disp: , Rfl:   •  ALPRAZolam (XANAX) 1 MG tablet, Take 1 mg by mouth 2 (Two) Times a Day., Disp: , Rfl: 1  •  aspirin 81 MG tablet, Take 1 tablet by mouth Daily., Disp: 30 tablet, Rfl: 11  •  atorvastatin (LIPITOR) 80 MG tablet, Take  by mouth., Disp: , Rfl:   •  budesonide-formoterol (SYMBICORT) 160-4.5 MCG/ACT inhaler, Symbicort 160-4.5 MCG/ACT Inhalation Aerosol; Patient Sig: Symbicort 160-4.5 MCG/ACT Inhalation Aerosol 2 puffs daily; 1; 3; 12-Mar-2014; Active, Disp: , Rfl:   •  Cholecalciferol (VITAMIN D) 1000 UNITS tablet, Take  by mouth daily., Disp: , Rfl:   •  clopidogrel (PLAVIX) 75 MG tablet, Take  by mouth daily., Disp: , Rfl:   •  diphenhydrAMINE (BENADRYL ALLERGY) 25 MG tablet, Patient to take one tablet bid the day before cath and one tablet the morning of cath, Disp: 3 tablet, Rfl: 0  •  doxycycline (MONODOX) 100 MG capsule, Take 100 mg by mouth 2 (Two) Times a Day., Disp: , Rfl: 0  •  DULoxetine (CYMBALTA) 60 MG capsule, Take  by mouth daily., Disp: , Rfl:   •  ezetimibe (ZETIA) 10 MG tablet, Take  by mouth., Disp: , Rfl:   •  famotidine (PEPCID) 20 MG tablet, Take one tablet bid the day before cath and one tablet the morning of cath, Disp: 3 tablet, Rfl: 0  •  folic acid (FOLVITE) 800 MCG tablet, Take  by mouth daily., Disp: , Rfl:   •  furosemide (LASIX) 20 MG tablet, Take  by mouth., Disp: , Rfl:   •  isosorbide dinitrate (ISORDIL) 30 MG tablet, TAKE 1 TABLET BY MOUTH IN THE MORNING AND 1 2 (ONE HALF) TABLET IN THE EVENING, Disp: , Rfl: 2  •   isosorbide mononitrate (IMDUR) 30 MG 24 hr tablet, Take 45 mg by mouth Daily., Disp: , Rfl:   •  lansoprazole (PREVACID) 15 MG capsule, Take 15 mg by mouth Daily., Disp: , Rfl: 2  •  levothyroxine (SYNTHROID, LEVOTHROID) 25 MCG tablet, Take  by mouth daily., Disp: , Rfl:   •  metFORMIN (GLUCOPHAGE) 500 MG tablet, Take 500 mg by mouth Daily With Breakfast., Disp: , Rfl:   •  metFORMIN ER (GLUCOPHAGE-XR) 500 MG 24 hr tablet, Take 500 mg by mouth Daily., Disp: , Rfl: 2  •  metoprolol succinate XL (TOPROL-XL) 50 MG 24 hr tablet, Take 1 tablet by mouth Daily., Disp: 30 tablet, Rfl: 6  •  nitroglycerin (NITROSTAT) 0.4 MG SL tablet, Place  under the tongue., Disp: , Rfl:   •  predniSONE (DELTASONE) 20 MG tablet, Take one tablet bid the day before cath and one tablet the morning of cath, Disp: 3 tablet, Rfl: 0  •  SPIRIVA RESPIMAT 2.5 MCG/ACT aerosol solution inhaler, INHALE 2 SPRAY(S) BY MOUTH ONCE DAILY, Disp: , Rfl: 3  •  tiotropium (SPIRIVA HANDIHALER) 18 MCG per inhalation capsule, daily., Disp: , Rfl:   •  VENTOLIN  (90 Base) MCG/ACT inhaler, As Needed., Disp: , Rfl:   Allergies   Allergen Reactions   • Acetaminophen Irritability   • Codeine GI Intolerance   • Contrast Dye Hives and Other (See Comments)     IVP DYE. Blisters on skin    • Hydrocodone Bitartrate Irritability   • Lortab [Hydrocodone-Acetaminophen] Itching and GI Intolerance     TABS   • Other      Darvocet-N 100 TABS   • Oxycodone Hcl Confusion   • Percocet [Oxycodone-Acetaminophen] GI Intolerance   • Penicillins Rash     Social History     Socioeconomic History   • Marital status:      Spouse name: Not on file   • Number of children: 2   • Years of education: Not on file   • Highest education level: Not on file   Occupational History   • Occupation: Nursing     Comment: disabled   Tobacco Use   • Smoking status: Current Every Day Smoker     Packs/day: 0.25     Years: 45.00     Pack years: 11.25     Types: Cigarettes   • Smokeless tobacco:  "Never Used   Substance and Sexual Activity   • Alcohol use: No   • Drug use: No   • Sexual activity: Defer   Social History Narrative    The patient lives with her daughter and  in [place].     Family History   Problem Relation Age of Onset   • Coronary artery disease Other    • Hyperlipidemia Other    • Hypertension Other    • Cancer Other    • Heart disease Mother    • Heart disease Father      Review of Systems   Constitution: Positive for malaise/fatigue. Negative for chills, fever, night sweats and weight loss.   HENT: Positive for congestion. Negative for hearing loss, nosebleeds and odynophagia.    Cardiovascular: Positive for dyspnea on exertion and palpitations. Negative for chest pain, claudication, leg swelling, orthopnea and syncope.   Respiratory: Positive for cough and shortness of breath. Negative for hemoptysis and wheezing.    Endocrine: Negative for cold intolerance, heat intolerance, polydipsia, polyphagia and polyuria.   Hematologic/Lymphatic: Bruises/bleeds easily.   Skin: Negative for itching, poor wound healing and rash.   Musculoskeletal: Positive for back pain (lower back pain), joint pain (bilateral hands pain ), joint swelling and muscle cramps (lower extremities, legs and toes cramping). Negative for arthritis and myalgias.   Gastrointestinal: Negative for abdominal pain, constipation, diarrhea, hematemesis, melena, nausea and vomiting.   Genitourinary: Positive for nocturia. Negative for dysuria, frequency, hematuria and urgency.   Neurological: Positive for dizziness and light-headedness. Negative for loss of balance and numbness.   Psychiatric/Behavioral: Positive for depression. Negative for suicidal ideas. The patient is nervous/anxious.    Allergic/Immunologic: Positive for environmental allergies. Negative for HIV exposure.     Vitals:    10/22/19 1136   BP: 132/72   Pulse: 72   SpO2: 98%   Weight: 80.7 kg (178 lb)   Height: 170.2 cm (67\")      Physical Exam  Gen - NAD, " pleasant, cooperative  CV -regular rate and rhythm, no murmur gallop or rub  Pulm -lungs clear to auscultation bilateral without wheeze or rhonchi  GI -soft, normoactive bowel sounds, nontender  Ext - without edema.   Incision -healing well, no evidence of incisional dehiscence or cellulitis  Neuro - CN II - XII grossly intact, tongue midline, voice normal    Labs/Imaging:  Outside carotid duplex US   Impression: 50-69% stenosis within proximal right carotid artery    Assessment/Plan:  Mrs. Stinson is a 58-year-old female with a history of hypertension, hyperlipidemia, COPD, diabetes, coronary disease status post CABG x 3 and active tobacco abuse who presents to office today for 1 year follow-up of her known carotid artery disease.  Carotid duplex ultrasound results discussed in detail with the patient and all questions were answered to her satisfaction.  This time we will plan to see the patient back again in 1 year with repeat carotid ultrasound for continued surveillance.  She can call our office with any questions or concerns should any arise during the interval.    Patient Active Problem List   Diagnosis   • Coronary artery disease   • Peripheral vascular disease (CMS/HCC)   • Palpitations   • Hypertension   • Hypothyroidism   • Dyslipidemia   • Anxiety   • Carotid artery stenosis   • Chest pain   • Depression   • PVC (premature ventricular contraction)   • SVT (supraventricular tachycardia) (CMS/HCC)   • Shortness of breath

## 2019-10-28 ENCOUNTER — OFFICE VISIT (OUTPATIENT)
Dept: CARDIOLOGY | Facility: CLINIC | Age: 59
End: 2019-10-28

## 2019-10-28 VITALS
SYSTOLIC BLOOD PRESSURE: 122 MMHG | HEART RATE: 100 BPM | WEIGHT: 175 LBS | DIASTOLIC BLOOD PRESSURE: 65 MMHG | HEIGHT: 66 IN | OXYGEN SATURATION: 89 % | BODY MASS INDEX: 28.12 KG/M2

## 2019-10-28 DIAGNOSIS — R06.02 SHORTNESS OF BREATH: Primary | ICD-10-CM

## 2019-10-28 DIAGNOSIS — I65.29 STENOSIS OF CAROTID ARTERY, UNSPECIFIED LATERALITY: ICD-10-CM

## 2019-10-28 DIAGNOSIS — R09.89 BILATERAL CAROTID BRUITS: ICD-10-CM

## 2019-10-28 DIAGNOSIS — E78.00 PURE HYPERCHOLESTEROLEMIA: ICD-10-CM

## 2019-10-28 DIAGNOSIS — I25.10 CORONARY ARTERY DISEASE INVOLVING NATIVE CORONARY ARTERY OF NATIVE HEART WITHOUT ANGINA PECTORIS: ICD-10-CM

## 2019-10-28 DIAGNOSIS — R07.9 CHEST PAIN, UNSPECIFIED TYPE: ICD-10-CM

## 2019-10-28 PROCEDURE — 99214 OFFICE O/P EST MOD 30 MIN: CPT | Performed by: PHYSICIAN ASSISTANT

## 2019-10-28 NOTE — PROGRESS NOTES
Problem list     Subjective   Ira Stinson is a 59 y.o. female     Chief Complaint   Patient presents with   • Coronary Artery Disease     Here for a follow up on heart cath    • Chest Pain   • Shortness of Breath     Problem List:  1. Coronary artery disease   1.1 History of CABG with follow-up stenting.  1.2 Recent catheterization, May 2014 with PTCA for IntraStent restenosis in the free radial to the RCA.  1.3. Repeat catheterization in July 2014 due to new 70-80% stenosis with angioplasty, subsequent dissection and stenting. Recommendations to consider single vessel bypass due to high grade stenosis  1.4 coronary artery bypass grafting with LIMA to LAD, vein graft to right coronary artery, vein graft to circumflex. Follow-up catheterization in 2014 demonstrated an occluded vein graft to RCA with stenting of the right coronary artery.  1.4 stress test September 2016 demonstrated posterolateral ischemia and preserved LV function  1.5 follow-up catheterization in November 2016 demonstrated high-grade disease involving the vein graft to the circumflex as well as the arterial graft to the PDA. There was high-grade disease of the native right coronary artery. Patient underwent stenting of the vein graft to circumflex, PTCA and stenting of the vein graft to the right coronary artery and stenting of the native right coronary artery.  1.6 stress test August 2019 suggest no evidence of ischemia preserved LV function  1.7 continued pain on antianginal therapy  1.8 cardiac catheterization October 2019 demonstrated high-grade RCA disease.  There was subtotal IntraStent restenosis as well as distal disease.  Patient underwent stenting x2.  Vein graft to circumflex was patent as well as LIMA.  Occluded native circumflex.  Medical management recommended  2. Peripheral vascular disease with history of right carotid endarterectomy and follow-up right endarterectomy, March 2011.  2.1 The patient has residual left carotid  stenosis, which was felt nonobstructive and not severe enough for mechanical intervention.  2.2 carotid duplex demonstrated 50 to 70% proximal right internal carotid artery stenosis.  3. Chronic palpitations with previous monitoring indicating short-lived episodes of SVT and frequent PVCs.  4. Hypertension  5. Dyslipidemia  6. Hypothyroidism  7.  Mild aortic stenosis by echocardiogram 2016      HPI    Patient is a 59-year-old female who presents back to the office for follow-up.  She had cardiac catheterization with stenting.  Patient describes to me that she feels better.  She is not as near as severely dyspneic as she would been in the past.  She feels much better.  She still has occasional sharp discomfort.  No progressive pain.  No PND orthopnea.    She does not palpitated of dysrhythmic symptoms.  She otherwise is doing well    Current Outpatient Medications on File Prior to Visit   Medication Sig Dispense Refill   • ALPRAZolam (XANAX) 0.5 MG tablet Take  by mouth 2 (Two) Times a Day.     • ALPRAZolam (XANAX) 1 MG tablet Take 1 mg by mouth 2 (Two) Times a Day.  1   • aspirin 81 MG tablet Take 1 tablet by mouth Daily. 30 tablet 11   • atorvastatin (LIPITOR) 80 MG tablet Take  by mouth.     • budesonide-formoterol (SYMBICORT) 160-4.5 MCG/ACT inhaler Symbicort 160-4.5 MCG/ACT Inhalation Aerosol; Patient Sig: Symbicort 160-4.5 MCG/ACT Inhalation Aerosol 2 puffs daily; 1; 3; 12-Mar-2014; Active     • Cholecalciferol (VITAMIN D) 1000 UNITS tablet Take  by mouth daily.     • clopidogrel (PLAVIX) 75 MG tablet Take  by mouth daily.     • DULoxetine (CYMBALTA) 60 MG capsule Take  by mouth daily.     • ezetimibe (ZETIA) 10 MG tablet Take  by mouth.     • folic acid (FOLVITE) 800 MCG tablet Take  by mouth daily.     • furosemide (LASIX) 20 MG tablet Take  by mouth.     • isosorbide dinitrate (ISORDIL) 30 MG tablet TAKE 1 TABLET BY MOUTH IN THE MORNING AND 1 2 (ONE HALF) TABLET IN THE EVENING  2   • isosorbide mononitrate  (IMDUR) 30 MG 24 hr tablet Take 45 mg by mouth Daily.     • lansoprazole (PREVACID) 15 MG capsule Take 15 mg by mouth Daily.  2   • levothyroxine (SYNTHROID, LEVOTHROID) 25 MCG tablet Take  by mouth daily.     • metFORMIN (GLUCOPHAGE) 500 MG tablet Take 500 mg by mouth Daily With Breakfast.     • metFORMIN ER (GLUCOPHAGE-XR) 500 MG 24 hr tablet Take 500 mg by mouth Daily.  2   • metoprolol succinate XL (TOPROL-XL) 50 MG 24 hr tablet Take 1 tablet by mouth Daily. 30 tablet 6   • nitroglycerin (NITROSTAT) 0.4 MG SL tablet Place  under the tongue.     • SPIRIVA RESPIMAT 2.5 MCG/ACT aerosol solution inhaler INHALE 2 SPRAY(S) BY MOUTH ONCE DAILY  3   • tiotropium (SPIRIVA HANDIHALER) 18 MCG per inhalation capsule daily.     • VENTOLIN  (90 Base) MCG/ACT inhaler As Needed.     • [DISCONTINUED] diphenhydrAMINE (BENADRYL ALLERGY) 25 MG tablet Patient to take one tablet bid the day before cath and one tablet the morning of cath 3 tablet 0   • [DISCONTINUED] doxycycline (MONODOX) 100 MG capsule Take 100 mg by mouth 2 (Two) Times a Day.  0   • [DISCONTINUED] famotidine (PEPCID) 20 MG tablet Take one tablet bid the day before cath and one tablet the morning of cath 3 tablet 0   • [DISCONTINUED] predniSONE (DELTASONE) 20 MG tablet Take one tablet bid the day before cath and one tablet the morning of cath 3 tablet 0     No current facility-administered medications on file prior to visit.        Acetaminophen; Codeine; Contrast dye; Hydrocodone bitartrate; Lortab [hydrocodone-acetaminophen]; Other; Oxycodone hcl; Percocet [oxycodone-acetaminophen]; and Penicillins    Past Medical History:   Diagnosis Date   • Anxiety    • Autoimmune disorder (CMS/HCC)    • Carotid artery stenosis    • Chest pain    • COPD (chronic obstructive pulmonary disease) (CMS/HCC)    • Coronary artery disease    • Depression    • Diabetes (CMS/HCC)    • Hyperlipidemia    • Hypertension    • Hypothyroidism    • Palpitations     with previous  monitoring indicating short-lived episodes of SVT and frequent PVCs.   • Peripheral vascular disease (CMS/HCC)     with history of right carotid endarterectomy and follow-up right endarterectomy, March 2011.   • PVC (premature ventricular contraction)    • PVD (peripheral vascular disease) (CMS/HCC)    • Shortness of breath    • SVT (supraventricular tachycardia) (CMS/HCC)        Social History     Socioeconomic History   • Marital status:      Spouse name: Not on file   • Number of children: 2   • Years of education: Not on file   • Highest education level: Not on file   Occupational History   • Occupation: Nursing     Comment: disabled   Tobacco Use   • Smoking status: Current Every Day Smoker     Packs/day: 0.25     Years: 45.00     Pack years: 11.25     Types: Cigarettes   • Smokeless tobacco: Never Used   Substance and Sexual Activity   • Alcohol use: No   • Drug use: No   • Sexual activity: Defer   Social History Narrative    The patient lives with her daughter and  in [place].       Family History   Problem Relation Age of Onset   • Coronary artery disease Other    • Hyperlipidemia Other    • Hypertension Other    • Cancer Other    • Heart disease Mother    • Heart disease Father        Review of Systems   Constitutional: Negative.  Negative for chills, fatigue and fever.   HENT: Positive for congestion.    Eyes: Positive for visual disturbance (reading glasses ).   Respiratory: Positive for apnea (uses cpap nightly with oxygen also ) and shortness of breath (stays short of air ). Negative for chest tightness.    Cardiovascular: Positive for palpitations (Racing, palps ). Negative for chest pain and leg swelling.   Gastrointestinal: Negative.    Endocrine: Positive for heat intolerance (stays hot ). Negative for cold intolerance.   Genitourinary: Negative.    Musculoskeletal: Positive for arthralgias (joints ), back pain (low back pain ) and myalgias (having a lot of muscle pain, cramping in  "legs. Wants to discuss cholesterol meds. ).   Skin: Negative.  Negative for rash and wound.   Allergic/Immunologic: Negative.  Negative for environmental allergies and food allergies.   Neurological: Positive for light-headedness (when standing too fast ). Negative for dizziness and weakness.   Hematological: Bruises/bleeds easily (bruises easily ).   Psychiatric/Behavioral: Negative.  Negative for sleep disturbance (denies waking with smothering ).   All other systems reviewed and are negative.      Objective   Vitals:    10/28/19 1306   BP: 122/65   BP Location: Left arm   Patient Position: Sitting   Pulse: 100   SpO2: (!) 89%   Weight: 79.4 kg (175 lb)   Height: 167.6 cm (66\")      /65 (BP Location: Left arm, Patient Position: Sitting)   Pulse 100   Ht 167.6 cm (66\")   Wt 79.4 kg (175 lb)   SpO2 (!) 89%   BMI 28.25 kg/m²     Lab Results (most recent)     None          Physical Exam   Constitutional: She is oriented to person, place, and time. She appears well-developed and well-nourished. No distress.   HENT:   Head: Normocephalic and atraumatic.   Eyes: EOM are normal. Pupils are equal, round, and reactive to light.   Neck: No JVD present. Carotid bruit is present.   Cardiovascular: Normal rate, regular rhythm, normal heart sounds and intact distal pulses. Exam reveals no gallop and no friction rub.   No murmur heard.  Pulmonary/Chest: Effort normal and breath sounds normal. No respiratory distress. She has no wheezes. She has no rales. She exhibits no tenderness.   Musculoskeletal: Normal range of motion. She exhibits no edema.   Neurological: She is alert and oriented to person, place, and time. No cranial nerve deficit.   Skin: Skin is warm and dry. No rash noted. No erythema. No pallor.   Psychiatric: She has a normal mood and affect. Her behavior is normal.   Nursing note and vitals reviewed.      Procedure   Procedures       Assessment/Plan     Problems Addressed this Visit        Cardiovascular " and Mediastinum    Coronary artery disease    Stenosis of carotid artery    Relevant Orders    CT Angiogram Carotids    Pure hypercholesterolemia    Relevant Medications    Alirocumab (PRALUENT) 150 MG/ML solution auto-injector    Bilateral carotid bruits    Relevant Orders    CT Angiogram Carotids       Respiratory    Shortness of breath - Primary       Nervous and Auditory    Chest pain          Recommendation  1.  Patient doing well.  Her symptoms have improved significantly.  Patient feels much better.  She would like to continue medical management.  2.  I would like to get her approved for Praluent.  Patient is on high-dose statin and Zetia.  She has myalgias and she would like alternative therapy.  3.  Patient with bilateral carotid bruits and carotid disease as well.  I am scheduling for CTA based on patient's carotid duplex findings.  4.  We will see her back for follow-up after testing.  She will follow with primary as scheduled           Ira Stinson is a current cigarettes user.  She currently smokes 1 pack of cigarettes per day for a duration of 43 years. I have educated her on the risk of diseases from using tobacco products such as cancer, COPD and heart diease.     I advised her to quit and she is not willing to quit.       Patient's Body mass index is 28.25 kg/m². BMI is above normal parameters. Recommendations include: educational material and referral to primary care.       Electronically signed by:

## 2019-10-28 NOTE — PATIENT INSTRUCTIONS
Steps to Quit Smoking    Smoking tobacco can be bad for your health. It can also affect almost every organ in your body. Smoking puts you and people around you at risk for many serious long-lasting (chronic) diseases. Quitting smoking is hard, but it is one of the best things that you can do for your health. It is never too late to quit.  What are the benefits of quitting smoking?  When you quit smoking, you lower your risk for getting serious diseases and conditions. They can include:  · Lung cancer or lung disease.  · Heart disease.  · Stroke.  · Heart attack.  · Not being able to have children (infertility).  · Weak bones (osteoporosis) and broken bones (fractures).  If you have coughing, wheezing, and shortness of breath, those symptoms may get better when you quit. You may also get sick less often. If you are pregnant, quitting smoking can help to lower your chances of having a baby of low birth weight.  What can I do to help me quit smoking?  Talk with your doctor about what can help you quit smoking. Some things you can do (strategies) include:  · Quitting smoking totally, instead of slowly cutting back how much you smoke over a period of time.  · Going to in-person counseling. You are more likely to quit if you go to many counseling sessions.  · Using resources and support systems, such as:  ? Online chats with a counselor.  ? Phone quitlines.  ? Printed self-help materials.  ? Support groups or group counseling.  ? Text messaging programs.  ? Mobile phone apps or applications.  · Taking medicines. Some of these medicines may have nicotine in them. If you are pregnant or breastfeeding, do not take any medicines to quit smoking unless your doctor says it is okay. Talk with your doctor about counseling or other things that can help you.  Talk with your doctor about using more than one strategy at the same time, such as taking medicines while you are also going to in-person counseling. This can help make  quitting easier.  What things can I do to make it easier to quit?  Quitting smoking might feel very hard at first, but there is a lot that you can do to make it easier. Take these steps:  · Talk to your family and friends. Ask them to support and encourage you.  · Call phone quitlines, reach out to support groups, or work with a counselor.  · Ask people who smoke to not smoke around you.  · Avoid places that make you want (trigger) to smoke, such as:  ? Bars.  ? Parties.  ? Smoke-break areas at work.  · Spend time with people who do not smoke.  · Lower the stress in your life. Stress can make you want to smoke. Try these things to help your stress:  ? Getting regular exercise.  ? Deep-breathing exercises.  ? Yoga.  ? Meditating.  ? Doing a body scan. To do this, close your eyes, focus on one area of your body at a time from head to toe, and notice which parts of your body are tense. Try to relax the muscles in those areas.  · Download or buy apps on your mobile phone or tablet that can help you stick to your quit plan. There are many free apps, such as QuitGuide from the CDC (Centers for Disease Control and Prevention). You can find more support from smokefree.gov and other websites.  This information is not intended to replace advice given to you by your health care provider. Make sure you discuss any questions you have with your health care provider.  Document Released: 10/14/2010 Document Revised: 08/15/2017 Document Reviewed: 05/03/2016  [employer] Interactive Patient Education © 2019 [employer]  Fat and Cholesterol Restricted Eating Plan  Getting too much fat and cholesterol in your diet may cause health problems. Choosing the right foods helps keep your fat and cholesterol at normal levels. This can keep you from getting certain diseases.  Your doctor may recommend an eating plan that includes:  · Total fat: ______% or less of total calories a day.  · Saturated fat: ______% or less of total calories a  "day.  · Cholesterol: less than _________mg a day.  · Fiber: ______g a day.  What are tips for following this plan?  General tips    · Work with your doctor to lose weight if you need to.  · Avoid:  ? Foods with added sugar.  ? Fried foods.  ? Foods with partially hydrogenated oils.  · Limit alcohol intake to no more than 1 drink a day for nonpregnant women and 2 drinks a day for men. One drink equals 12 oz of beer, 5 oz of wine, or 1½ oz of hard liquor.  Reading food labels  · Check food labels for:  ? Trans fats.  ? Partially hydrogenated oils.  ? Saturated fat (g) in each serving.  ? Cholesterol (mg) in each serving.  ? Fiber (g) in each serving.  · Choose foods with healthy fats, such as:  ? Monounsaturated fats.  ? Polyunsaturated fats.  ? Omega-3 fats.  · Choose grain products that have whole grains. Look for the word \"whole\" as the first word in the ingredient list.  Cooking  · Cook foods using low-fat methods. These include baking, boiling, grilling, and broiling.  · Eat more home-cooked foods. Eat at restaurants and buffets less often.  · Avoid cooking using saturated fats, such as butter, cream, palm oil, palm kernel oil, and coconut oil.  Meal planning    · At meals, divide your plate into four equal parts:  ? Fill one-half of your plate with vegetables and green salads.  ? Fill one-fourth of your plate with whole grains.  ? Fill one-fourth of your plate with low-fat (lean) protein foods.  · Eat fish that is high in omega-3 fats at least two times a week. This includes mackerel, tuna, sardines, and salmon.  · Eat foods that are high in fiber, such as whole grains, beans, apples, broccoli, carrots, peas, and barley.  Recommended foods  Grains  · Whole grains, such as whole wheat or whole grain breads, crackers, cereals, and pasta. Unsweetened oatmeal, bulgur, barley, quinoa, or brown rice. Corn or whole wheat flour tortillas.  Vegetables  · Fresh or frozen vegetables (raw, steamed, roasted, or grilled). " Green salads.  Fruits  · All fresh, canned (in natural juice), or frozen fruits.  Meats and other protein foods  · Ground beef (85% or leaner), grass-fed beef, or beef trimmed of fat. Skinless chicken or turkey. Ground chicken or turkey. Pork trimmed of fat. All fish and seafood. Egg whites. Dried beans, peas, or lentils. Unsalted nuts or seeds. Unsalted canned beans. Nut butters without added sugar or oil.  Dairy  · Low-fat or nonfat dairy products, such as skim or 1% milk, 2% or reduced-fat cheeses, low-fat and fat-free ricotta or cottage cheese, or plain low-fat and nonfat yogurt.  Fats and oils  · Tub margarine without trans fats. Light or reduced-fat mayonnaise and salad dressings. Avocado. Olive, canola, sesame, or safflower oils.  The items listed above may not be a complete list of recommended foods or beverages. Contact your dietitian for more options.  The items listed above may not be a complete list of foods and beverages [you/your child] can eat. Contact a dietitian for more information.  Foods to avoid  Grains  · White bread. White pasta. White rice. Cornbread. Bagels, pastries, and croissants. Crackers and snack foods that contain trans fat and hydrogenated oils.  Vegetables  · Vegetables cooked in cheese, cream, or butter sauce. Fried vegetables.  Fruits  · Canned fruit in heavy syrup. Fruit in cream or butter sauce. Fried fruit.  Meats and other protein foods  · Fatty cuts of meat. Ribs, chicken wings, clifford, sausage, bologna, salami, chitterlings, fatback, hot dogs, bratwurst, and packaged lunch meats. Liver and organ meats. Whole eggs and egg yolks. Chicken and turkey with skin. Fried meat.  Dairy  · Whole or 2% milk, cream, half-and-half, and cream cheese. Whole milk cheeses. Whole-fat or sweetened yogurt. Full-fat cheeses. Nondairy creamers and whipped toppings. Processed cheese, cheese spreads, and cheese curds.  Beverages  · Alcohol. Sugar-sweetened drinks such as sodas, lemonade, and fruit  drinks.  Fats and oils  · Butter, stick margarine, lard, shortening, ghee, or clifford fat. Coconut, palm kernel, and palm oils.  Sweets and desserts  · Corn syrup, sugars, honey, and molasses. Candy. Jam and jelly. Syrup. Sweetened cereals. Cookies, pies, cakes, donuts, muffins, and ice cream.  The items listed above may not be a complete list of foods and beverages to avoid. Contact your dietitian for more information.  The items listed above may not be a complete list of foods and beverages [you/your child] should avoid. Contact a dietitian for more information.  Summary  · Choosing the right foods helps keep your fat and cholesterol at normal levels. This can keep you from getting certain diseases.  · At meals, fill one-half of your plate with vegetables and green salads.  · Eat high-fiber foods, like whole grains, beans, apples, carrots, peas, and barley.  · Limit added sugar, saturated fats, alcohol, and fried foods.  This information is not intended to replace advice given to you by your health care provider. Make sure you discuss any questions you have with your health care provider.  Document Released: 06/18/2013 Document Revised: 09/04/2018 Document Reviewed: 09/04/2018  [employer] Interactive Patient Education © 2019 [employer]  BMI for Adults    Body mass index (BMI) is a number that is calculated from a person's weight and height. BMI may help to estimate how much of a person's weight is composed of fat. BMI can help identify those who may be at higher risk for certain medical problems.  How is BMI used with adults?  BMI is used as a screening tool to identify possible weight problems. It is used to check whether a person is obese, overweight, healthy weight, or underweight.  How is BMI calculated?  BMI measures your weight and compares it to your height. This can be done either in English (U.S[place] or metric measurements. Note that charts are available to help you find your BMI quickly and easily without  "having to do these calculations yourself.  To calculate your BMI in English (U.S[place] measurements, your health care provider will:  1. Measure your weight in pounds (lb).  2. Multiply the number of pounds by 703.  ? For example, for a person who weighs 180 lb, multiply that number by 703, which equals 126,540.  3. Measure your height in inches (in). Then multiply that number by itself to get a measurement called \"inches squared.\"  ? For example, for a person who is 70 in tall, the \"inches squared\" measurement is 70 in x 70 in, which equals 4900 inches squared.  4. Divide the total from Step 2 (number of lb x 703) by the total from Step 3 (inches squared): 126,540 ÷ 4900 = 25.8. This is your BMI.  To calculate your BMI in metric measurements, your health care provider will:  1. Measure your weight in kilograms (kg).  2. Measure your height in meters (m). Then multiply that number by itself to get a measurement called \"meters squared.\"  ? For example, for a person who is 1.75 m tall, the \"meters squared\" measurement is 1.75 m x 1.75 m, which is equal to 3.1 meters squared.  3. Divide the number of kilograms (your weight) by the meters squared number. In this example: 70 ÷ 3.1 = 22.6. This is your BMI.  How is BMI interpreted?  To interpret your results, your health care provider will use BMI charts to identify whether you are underweight, normal weight, overweight, or obese. The following guidelines will be used:  · Underweight: BMI less than 18.5.  · Normal weight: BMI between 18.5 and 24.9.  · Overweight: BMI between 25 and 29.9.  · Obese: BMI of 30 and above.  Please note:  · Weight includes both fat and muscle, so someone with a muscular build, such as an athlete, may have a BMI that is higher than 24.9. In cases like these, BMI is not an accurate measure of body fat.  · To determine if excess body fat is the cause of a BMI of 25 or higher, further assessments may need to be done by a health care provider.  · BMI is " usually interpreted in the same way for men and women.  Why is BMI a useful tool?  BMI is useful in two ways:  · Identifying a weight problem that may be related to a medical condition, or that may increase the risk for medical problems.  · Promoting lifestyle and diet changes in order to reach a healthy weight.  Summary  · Body mass index (BMI) is a number that is calculated from a person's weight and height.  · BMI may help to estimate how much of a person's weight is composed of fat. BMI can help identify those who may be at higher risk for certain medical problems.  · BMI can be measured using English measurements or metric measurements.  · To interpret your results, your health care provider will use BMI charts to identify whether you are underweight, normal weight, overweight, or obese.  This information is not intended to replace advice given to you by your health care provider. Make sure you discuss any questions you have with your health care provider.  Document Released: 08/29/2005 Document Revised: 10/31/2018 Document Reviewed: 10/31/2018  [employer] Interactive Patient Education © 2019 [employer]

## 2019-10-29 ENCOUNTER — TELEPHONE (OUTPATIENT)
Dept: CARDIOLOGY | Facility: CLINIC | Age: 59
End: 2019-10-29

## 2019-10-29 ENCOUNTER — PRIOR AUTHORIZATION (OUTPATIENT)
Dept: CARDIOLOGY | Facility: CLINIC | Age: 59
End: 2019-10-29

## 2019-10-29 DIAGNOSIS — R06.02 SHORTNESS OF BREATH: ICD-10-CM

## 2019-10-29 DIAGNOSIS — R00.2 PALPITATIONS: Primary | ICD-10-CM

## 2019-10-29 NOTE — TELEPHONE ENCOUNTER
----- Message from Karina Huffman sent at 10/29/2019  8:37 AM EDT -----   Pt scheduled for CTA and needs bun/creatinine levels. She's scheduled for Friday at 9am. She also states she has a contrast allergy and will need premeds called in to [facility].

## 2019-10-30 ENCOUNTER — TELEPHONE (OUTPATIENT)
Dept: CARDIOLOGY | Facility: CLINIC | Age: 59
End: 2019-10-30

## 2019-10-30 RX ORDER — FAMOTIDINE 20 MG/1
TABLET, FILM COATED ORAL
Qty: 3 TABLET | Refills: 0 | Status: ON HOLD | OUTPATIENT
Start: 2019-10-30 | End: 2019-12-12

## 2019-10-30 RX ORDER — PREDNISONE 20 MG/1
TABLET ORAL
Qty: 3 TABLET | Refills: 0 | Status: ON HOLD | OUTPATIENT
Start: 2019-10-30 | End: 2019-12-12

## 2019-10-30 RX ORDER — DIPHENHYDRAMINE HCL 25 MG
TABLET ORAL
Qty: 3 TABLET | Refills: 0 | Status: ON HOLD | OUTPATIENT
Start: 2019-10-30 | End: 2019-12-12

## 2019-10-30 NOTE — TELEPHONE ENCOUNTER
Patient called requesting pre-meds for contrast dye allergy sent to [facility] in [place]. CTA scheduled Friday.   Sent as requested.

## 2019-11-03 ENCOUNTER — RESULTS ENCOUNTER (OUTPATIENT)
Dept: CARDIOLOGY | Facility: CLINIC | Age: 59
End: 2019-11-03

## 2019-11-03 DIAGNOSIS — R06.02 SHORTNESS OF BREATH: ICD-10-CM

## 2019-11-03 DIAGNOSIS — R00.2 PALPITATIONS: ICD-10-CM

## 2019-11-08 ENCOUNTER — TELEPHONE (OUTPATIENT)
Dept: CARDIOLOGY | Facility: CLINIC | Age: 59
End: 2019-11-08

## 2019-11-08 DIAGNOSIS — I65.29 STENOSIS OF CAROTID ARTERY, UNSPECIFIED LATERALITY: Primary | ICD-10-CM

## 2019-11-08 DIAGNOSIS — I25.10 CORONARY ARTERY DISEASE INVOLVING NATIVE CORONARY ARTERY OF NATIVE HEART WITHOUT ANGINA PECTORIS: ICD-10-CM

## 2019-11-08 DIAGNOSIS — R09.89 BILATERAL CAROTID BRUITS: ICD-10-CM

## 2019-11-08 NOTE — TELEPHONE ENCOUNTER
Spoke to patient and notified her of results. She prefers to see Dr. Quinn. Referral ordered and patient notified that she needs to  disk of CTA to take with her to appt.  LUBA LOVE    Attempted to call patient to discuss results of CTA of carotids. Per JR Beebe, patient needs to be referred to neurosurgeon here in [place] or Dr. Quinn in [place].  L/M asking patient to call office back after 8am on Monday.  LUBA LOVE

## 2019-11-15 ENCOUNTER — TELEPHONE (OUTPATIENT)
Dept: CARDIOLOGY | Facility: CLINIC | Age: 59
End: 2019-11-15

## 2019-11-15 DIAGNOSIS — I65.29 STENOSIS OF CAROTID ARTERY, UNSPECIFIED LATERALITY: Primary | ICD-10-CM

## 2019-11-15 NOTE — TELEPHONE ENCOUNTER
Patient informed Dr. Quinn would prefer her to see Dr Aravind Currie before stenting, their office would contact patient with appointment details. Patient verbalized understanding. Julianna Boo LPN

## 2019-11-27 ENCOUNTER — TELEPHONE (OUTPATIENT)
Dept: NEUROSURGERY | Facility: CLINIC | Age: 59
End: 2019-11-27

## 2019-11-27 ENCOUNTER — OFFICE VISIT (OUTPATIENT)
Dept: NEUROSURGERY | Facility: CLINIC | Age: 59
End: 2019-11-27

## 2019-11-27 ENCOUNTER — PREP FOR SURGERY (OUTPATIENT)
Dept: OTHER | Facility: HOSPITAL | Age: 59
End: 2019-11-27

## 2019-11-27 ENCOUNTER — APPOINTMENT (OUTPATIENT)
Dept: PREADMISSION TESTING | Facility: HOSPITAL | Age: 59
End: 2019-11-27

## 2019-11-27 VITALS
WEIGHT: 178 LBS | HEIGHT: 66 IN | BODY MASS INDEX: 28.61 KG/M2 | TEMPERATURE: 97.4 F | DIASTOLIC BLOOD PRESSURE: 62 MMHG | SYSTOLIC BLOOD PRESSURE: 126 MMHG

## 2019-11-27 DIAGNOSIS — I65.21 CAROTID STENOSIS, ASYMPTOMATIC, RIGHT: Primary | ICD-10-CM

## 2019-11-27 DIAGNOSIS — I65.21 CAROTID STENOSIS, ASYMPTOMATIC, RIGHT: ICD-10-CM

## 2019-11-27 LAB
ANION GAP SERPL CALCULATED.3IONS-SCNC: 12 MMOL/L (ref 5–15)
BUN BLD-MCNC: 12 MG/DL (ref 8–23)
BUN/CREAT SERPL: 20.3 (ref 7–25)
CALCIUM SPEC-SCNC: 9.7 MG/DL (ref 8.6–10.5)
CHLORIDE SERPL-SCNC: 101 MMOL/L (ref 98–107)
CO2 SERPL-SCNC: 28 MMOL/L (ref 22–29)
CREAT BLD-MCNC: 0.59 MG/DL (ref 0.57–1)
DEPRECATED RDW RBC AUTO: 56 FL (ref 37–54)
ERYTHROCYTE [DISTWIDTH] IN BLOOD BY AUTOMATED COUNT: 17.7 % (ref 12.3–15.4)
GFR SERPL CREATININE-BSD FRML MDRD: 104 ML/MIN/1.73
GLUCOSE BLD-MCNC: 103 MG/DL (ref 65–99)
HCT VFR BLD AUTO: 40.3 % (ref 34–46.6)
HGB BLD-MCNC: 12.2 G/DL (ref 12–15.9)
MCH RBC QN AUTO: 26.5 PG (ref 26.6–33)
MCHC RBC AUTO-ENTMCNC: 30.3 G/DL (ref 31.5–35.7)
MCV RBC AUTO: 87.6 FL (ref 79–97)
PLATELET # BLD AUTO: 289 10*3/MM3 (ref 140–450)
PMV BLD AUTO: 10 FL (ref 6–12)
POTASSIUM BLD-SCNC: 4.9 MMOL/L (ref 3.5–5.2)
RBC # BLD AUTO: 4.6 10*6/MM3 (ref 3.77–5.28)
SODIUM BLD-SCNC: 141 MMOL/L (ref 136–145)
WBC NRBC COR # BLD: 9.35 10*3/MM3 (ref 3.4–10.8)

## 2019-11-27 PROCEDURE — 99204 OFFICE O/P NEW MOD 45 MIN: CPT | Performed by: RADIOLOGY

## 2019-11-27 PROCEDURE — 36415 COLL VENOUS BLD VENIPUNCTURE: CPT

## 2019-11-27 PROCEDURE — 85027 COMPLETE CBC AUTOMATED: CPT | Performed by: RADIOLOGY

## 2019-11-27 PROCEDURE — 80048 BASIC METABOLIC PNL TOTAL CA: CPT | Performed by: RADIOLOGY

## 2019-11-27 RX ORDER — LANSOPRAZOLE 30 MG/1
30 CAPSULE, DELAYED RELEASE ORAL DAILY
COMMUNITY

## 2019-11-27 RX ORDER — FOLIC ACID 1 MG/1
1 TABLET ORAL DAILY
COMMUNITY

## 2019-11-27 RX ORDER — METOPROLOL TARTRATE 50 MG/1
50 TABLET, FILM COATED ORAL 2 TIMES DAILY
COMMUNITY
End: 2020-08-25

## 2019-11-27 RX ORDER — SODIUM CHLORIDE 0.9 % (FLUSH) 0.9 %
1-10 SYRINGE (ML) INJECTION AS NEEDED
Status: CANCELLED | OUTPATIENT
Start: 2019-11-27

## 2019-11-27 RX ORDER — SODIUM CHLORIDE 9 MG/ML
50 INJECTION, SOLUTION INTRAVENOUS CONTINUOUS
Status: CANCELLED | OUTPATIENT
Start: 2019-11-27

## 2019-11-27 RX ORDER — ASCORBIC ACID 500 MG
500 TABLET ORAL DAILY
Status: ON HOLD | COMMUNITY
End: 2021-04-08

## 2019-11-27 RX ORDER — SODIUM CHLORIDE 0.9 % (FLUSH) 0.9 %
3 SYRINGE (ML) INJECTION EVERY 12 HOURS SCHEDULED
Status: CANCELLED | OUTPATIENT
Start: 2019-11-27

## 2019-11-27 NOTE — PROGRESS NOTES
NAME: KATHERINE BANEGAS   DOS: 2019  : 1960  PCP: Ernesto Whitfield MD    Chief Complaint:    Chief Complaint   Patient presents with   • Neck Pain       History of Present Illness:  59 y.o. female who presents today for evaluation of a carotid artery disease.  She does have a history of a prior right carotid endarterectomy in  (Dr. Sharif), and has extensive cardiac history with prior CABG and stent placement.  She is on chronic dual antiplatelet therapy.  She does continue to smoke at least a half a pack per day.  She denies any stroke or TIA-like symptoms, specifically no unilateral weakness/numbness, no speech or vision changes.  She has had outside CT angiogram suggesting bilateral high-grade carotid stenoses.    Past Medical History:  Past Medical History:   Diagnosis Date   • Anxiety    • Autoimmune disorder (CMS/HCC)    • Carotid artery stenosis    • Chest pain    • COPD (chronic obstructive pulmonary disease) (CMS/HCC)    • Coronary artery disease    • Depression    • Diabetes (CMS/HCC)    • Hyperlipidemia    • Hypertension    • Hypothyroidism    • Palpitations     with previous monitoring indicating short-lived episodes of SVT and frequent PVCs.   • Peripheral vascular disease (CMS/HCC)     with history of right carotid endarterectomy and follow-up right endarterectomy, 2011.   • PVC (premature ventricular contraction)    • PVD (peripheral vascular disease) (CMS/HCC)    • Shortness of breath    • SVT (supraventricular tachycardia) (CMS/HCC)        Past Surgical History:  Past Surgical History:   Procedure Laterality Date   • CARDIAC CATHETERIZATION  10/07/2019    2 stents    • CAROTID ENDARTERECTOMY Right 2011    Dr. Sharif   • CATARACT EXTRACTION, BILATERAL     • CORONARY ARTERY BYPASS GRAFT      X3   • CORONARY STENT PLACEMENT     • HERNIA REPAIR      Umbilical Hernia Repair X2   • TUBAL ABDOMINAL LIGATION         Review of Systems:        Review of Systems   Constitutional:  Positive for fatigue.   Eyes: Positive for redness and itching.   Respiratory: Positive for shortness of breath and wheezing.    Gastrointestinal: Positive for abdominal distention.   Endocrine: Positive for heat intolerance.   Musculoskeletal: Positive for arthralgias, back pain and neck stiffness.   Neurological: Positive for dizziness, weakness, numbness and headaches.   Hematological: Bruises/bleeds easily.   Psychiatric/Behavioral: Positive for dysphoric mood. The patient is nervous/anxious.    All other systems reviewed and are negative.       Medications    Current Outpatient Medications:   •  Alirocumab (PRALUENT) 150 MG/ML solution auto-injector, Inject 1 applicator under the skin into the appropriate area as directed Every 14 (Fourteen) Days., Disp: 2 pen, Rfl: 11  •  ALPRAZolam (XANAX) 0.5 MG tablet, Take  by mouth 2 (Two) Times a Day., Disp: , Rfl:   •  ALPRAZolam (XANAX) 1 MG tablet, Take 1 mg by mouth 2 (Two) Times a Day., Disp: , Rfl: 1  •  aspirin 81 MG tablet, Take 1 tablet by mouth Daily., Disp: 30 tablet, Rfl: 11  •  atorvastatin (LIPITOR) 80 MG tablet, Take  by mouth., Disp: , Rfl:   •  budesonide-formoterol (SYMBICORT) 160-4.5 MCG/ACT inhaler, Symbicort 160-4.5 MCG/ACT Inhalation Aerosol; Patient Sig: Symbicort 160-4.5 MCG/ACT Inhalation Aerosol 2 puffs daily; 1; 3; 12-Mar-2014; Active, Disp: , Rfl:   •  Cholecalciferol (VITAMIN D) 1000 UNITS tablet, Take  by mouth daily., Disp: , Rfl:   •  clopidogrel (PLAVIX) 75 MG tablet, Take  by mouth daily., Disp: , Rfl:   •  diphenhydrAMINE (BENADRYL) 25 MG tablet, Take BID day before CTA and the AM of CTA., Disp: 3 tablet, Rfl: 0  •  DULoxetine (CYMBALTA) 60 MG capsule, Take  by mouth daily., Disp: , Rfl:   •  ezetimibe (ZETIA) 10 MG tablet, Take  by mouth., Disp: , Rfl:   •  famotidine (PEPCID) 20 MG tablet, Take BID day before CTA and the AM of CTA., Disp: 3 tablet, Rfl: 0  •  folic acid (FOLVITE) 800 MCG tablet, Take  by mouth daily., Disp: , Rfl:   •   furosemide (LASIX) 20 MG tablet, Take  by mouth., Disp: , Rfl:   •  isosorbide dinitrate (ISORDIL) 30 MG tablet, TAKE 1 TABLET BY MOUTH IN THE MORNING AND 1 2 (ONE HALF) TABLET IN THE EVENING, Disp: , Rfl: 2  •  isosorbide mononitrate (IMDUR) 30 MG 24 hr tablet, Take 45 mg by mouth Daily., Disp: , Rfl:   •  lansoprazole (PREVACID) 15 MG capsule, Take 15 mg by mouth Daily., Disp: , Rfl: 2  •  levothyroxine (SYNTHROID, LEVOTHROID) 25 MCG tablet, Take  by mouth daily., Disp: , Rfl:   •  metFORMIN (GLUCOPHAGE) 500 MG tablet, Take 500 mg by mouth Daily With Breakfast., Disp: , Rfl:   •  metFORMIN ER (GLUCOPHAGE-XR) 500 MG 24 hr tablet, Take 500 mg by mouth Daily., Disp: , Rfl: 2  •  metoprolol succinate XL (TOPROL-XL) 50 MG 24 hr tablet, Take 1 tablet by mouth Daily., Disp: 30 tablet, Rfl: 6  •  nitroglycerin (NITROSTAT) 0.4 MG SL tablet, Place  under the tongue., Disp: , Rfl:   •  predniSONE (DELTASONE) 20 MG tablet, Take BID day before CTA and the AM of CTA., Disp: 3 tablet, Rfl: 0  •  SPIRIVA RESPIMAT 2.5 MCG/ACT aerosol solution inhaler, INHALE 2 SPRAY(S) BY MOUTH ONCE DAILY, Disp: , Rfl: 3  •  tiotropium (SPIRIVA HANDIHALER) 18 MCG per inhalation capsule, daily., Disp: , Rfl:   •  VENTOLIN  (90 Base) MCG/ACT inhaler, As Needed., Disp: , Rfl:     Allergies:  Allergies   Allergen Reactions   • Acetaminophen Irritability   • Codeine GI Intolerance   • Contrast Dye Hives and Other (See Comments)     IVP DYE. Blisters on skin    • Hydrocodone Bitartrate Irritability   • Lortab [Hydrocodone-Acetaminophen] Itching and GI Intolerance     TABS   • Other      Darvocet-N 100 TABS   • Oxycodone Hcl Confusion   • Percocet [Oxycodone-Acetaminophen] GI Intolerance   • Penicillins Rash       Social Hx:  Social History     Tobacco Use   • Smoking status: Current Every Day Smoker     Packs/day: 0.25     Years: 45.00     Pack years: 11.25     Types: Cigarettes   • Smokeless tobacco: Never Used   Substance Use Topics   •  Alcohol use: No   • Drug use: No       Family Hx:  Family History   Problem Relation Age of Onset   • Coronary artery disease Other    • Hyperlipidemia Other    • Hypertension Other    • Cancer Other    • Heart disease Mother    • Heart disease Father        Review of Imaging:  CT angiogram of the neck dated 11/1/2019 from [facility] was reviewed along with its corresponding radiologic report.  There are changes related to prior right carotid endarterectomy, but there is lesley-atherosclerosis/progression of disease at the distal endarterectomy margin, with probable high-grade (at least 70%) stenosis utilizing NASCET derived criteria.  The right carotid disease has significantly progressed compared to prior outside CT angiogram dated 5/4/2018 available in the [facility] PACS system.  There remains bulky calcific plaque at the left carotid bifurcation, but I feel this is predominantly stable and likely representing a less than 50% stenosis.    Physical Examination:  Vitals:    11/27/19 1304   BP: 126/62   Temp: 97.4 °F (36.3 °C)        General Appearance:   Well developed, well nourished, well groomed, alert, and cooperative.  Cardiovascular: Irregularly, irregular.  Bilateral carotid bruits      Neurological examination:  Neurologic Exam     Mental Status   Oriented to person, place, and time.   Speech: speech is normal   Level of consciousness: alert    Cranial Nerves   Cranial nerves II through XII intact.     Motor Exam     Strength   Strength 5/5 throughout.     Sensory Exam   Light touch normal.     Gait, Coordination, and Reflexes     Gait  Gait: normal      Diagnoses/Plan:    Ms. Stinson is a 59 y.o. female with recurrent (prior carotid endarterectomy), probable high-grade (greater than 70%) stenosis involving her right internal carotid artery.  She denies any stroke or TIA-like symptoms, but is concerned about stroke risk.  Additionally, the right carotid disease has clearly progressed compared to a  "CT angiogram from 5/4/2018, and thus I think it is reasonable to pursue treatment.  Given her prior endarterectomy, she is deemed a \"high surgical risk\" for endarterectomy, and we will tentatively schedule her for angioplasty/stent placement in the next couple of weeks or so.  Additionally, we will evaluate the severity of her left carotid disease, but I suspect that this is not a hemodynamically significant (less than 50%) lesion and will be able to be managed medically.  She will remain on her chronic dual antiplatelet regimen.    The importance of smoking cessation was discussed at length with the patient/patient's family (3-10 minutes).  Specifically, the risk of progression and recurrence of cerebrovascular disease (i.e. stroke, stenosis) despite medical therapy/intervention with continued smoking. The patient/patient's family expressed an understanding of this risk.                  "

## 2019-12-12 ENCOUNTER — HOSPITAL ENCOUNTER (INPATIENT)
Facility: HOSPITAL | Age: 59
LOS: 1 days | Discharge: HOME | End: 2019-12-13
Attending: RADIOLOGY | Admitting: RADIOLOGY

## 2019-12-12 ENCOUNTER — APPOINTMENT (OUTPATIENT)
Dept: CARDIOLOGY | Facility: HOSPITAL | Age: 59
End: 2019-12-12

## 2019-12-12 DIAGNOSIS — I65.21 CAROTID STENOSIS, ASYMPTOMATIC, RIGHT: ICD-10-CM

## 2019-12-12 PROBLEM — Z99.89 OSA ON CPAP: Status: ACTIVE | Noted: 2019-12-12

## 2019-12-12 PROBLEM — E11.9 TYPE 2 DIABETES MELLITUS (HCC): Status: ACTIVE | Noted: 2019-12-12

## 2019-12-12 PROBLEM — G47.34 NOCTURNAL HYPOXEMIA: Status: ACTIVE | Noted: 2019-12-12

## 2019-12-12 PROBLEM — Z72.0 TOBACCO USE: Status: ACTIVE | Noted: 2019-12-12

## 2019-12-12 PROBLEM — Z86.79 HISTORY OF CAROTID ARTERY DISEASE: Status: ACTIVE | Noted: 2019-12-12

## 2019-12-12 PROBLEM — J44.9 COPD (CHRONIC OBSTRUCTIVE PULMONARY DISEASE): Status: ACTIVE | Noted: 2019-12-12

## 2019-12-12 PROBLEM — G47.33 OSA ON CPAP: Status: ACTIVE | Noted: 2019-12-12

## 2019-12-12 LAB
ACT BLD: 219 SECONDS (ref 82–152)
BH CV ECHO MEAS - BSA(HAYCOCK): 2 M^2
BH CV ECHO MEAS - BSA: 1.9 M^2
BH CV ECHO MEAS - BZI_BMI: 28.7 KILOGRAMS/M^2
BH CV ECHO MEAS - BZI_METRIC_HEIGHT: 167.6 CM
BH CV ECHO MEAS - BZI_METRIC_WEIGHT: 80.7 KG
BH CV MID RIGHT ICA HIDDEN LRR: 1 CM
BH CV VAS CAROTID RIGHT DISTAL TO STENT NATIVE VESSEL E: 23.7 CM/S
BH CV VAS CAROTID RIGHT DISTAL TO STENT PSV: 116 CM/S
BH CV VAS CAROTID RIGHT PROXIMAL STENT EDV: 23.3 CM/S
BH CV VAS CAROTID RIGHT PROXIMAL STENT PSV: 124 CM/S
BH CV VAS CAROTID RIGHT STENT NATIVE VESSEL PROXIMAL EDV: 13.8 CM/S
BH CV VAS CAROTID RIGHT STENT NATIVE VESSEL PROXIMAL PS: 51.5 CM/S
BH CV XLRA MEAS LEFT BULB EDV: 27.7 CM/SEC
BH CV XLRA MEAS LEFT BULB PSV: 211.2 CM/SEC
BH CV XLRA MEAS LEFT CCA RATIO VEL: 116 CM/SEC
BH CV XLRA MEAS LEFT DIST CCA EDV: 21.6 CM/SEC
BH CV XLRA MEAS LEFT DIST CCA PSV: 125.7 CM/SEC
BH CV XLRA MEAS LEFT DIST ICA EDV: 24.4 CM/SEC
BH CV XLRA MEAS LEFT DIST ICA PSV: 108.2 CM/SEC
BH CV XLRA MEAS LEFT ICA RATIO VEL: 113 CM/SEC
BH CV XLRA MEAS LEFT ICA/CCA RATIO: 0.97
BH CV XLRA MEAS LEFT MID CCA EDV: 26.5 CM/SEC
BH CV XLRA MEAS LEFT MID CCA PSV: 116.9 CM/SEC
BH CV XLRA MEAS LEFT MID ICA EDV: 20.6 CM/SEC
BH CV XLRA MEAS LEFT MID ICA PSV: 113.9 CM/SEC
BH CV XLRA MEAS LEFT PROX CCA EDV: 31.4 CM/SEC
BH CV XLRA MEAS LEFT PROX CCA PSV: 151.3 CM/SEC
BH CV XLRA MEAS LEFT PROX ECA PSV: 211.9 CM/SEC
BH CV XLRA MEAS LEFT PROX ICA EDV: 23.6 CM/SEC
BH CV XLRA MEAS LEFT PROX ICA PSV: 108 CM/SEC
BH CV XLRA MEAS LEFT PROX SCLA PSV: 151.9 CM/SEC
BH CV XLRA MEAS LEFT VERTEBRAL A PSV: 51.1 CM/SEC
BH CV XLRA MEAS RIGHT CCA RATIO VEL: 66 CM/SEC
BH CV XLRA MEAS RIGHT DIST CCA EDV: 15.7 CM/SEC
BH CV XLRA MEAS RIGHT DIST CCA PSV: 76.1 CM/SEC
BH CV XLRA MEAS RIGHT DIST ICA EDV: 26.5 CM/SEC
BH CV XLRA MEAS RIGHT DIST ICA PSV: 122.2 CM/SEC
BH CV XLRA MEAS RIGHT ICA RATIO VEL: 125 CM/SEC
BH CV XLRA MEAS RIGHT ICA/CCA RATIO: 1.9
BH CV XLRA MEAS RIGHT MID CCA EDV: 14.5 CM/SEC
BH CV XLRA MEAS RIGHT MID CCA PSV: 66.6 CM/SEC
BH CV XLRA MEAS RIGHT MID ICA EDV: 25.8 CM/SEC
BH CV XLRA MEAS RIGHT MID ICA PSV: 125.7 CM/SEC
BH CV XLRA MEAS RIGHT PROX CCA EDV: 15.7 CM/SEC
BH CV XLRA MEAS RIGHT PROX CCA PSV: 87.2 CM/SEC
BH CV XLRA MEAS RIGHT PROX ECA PSV: 266.6 CM/SEC
BH CV XLRA MEAS RIGHT PROX ICA EDV: 14.5 CM/SEC
BH CV XLRA MEAS RIGHT PROX ICA PSV: 52.2 CM/SEC
BH CV XLRA MEAS RIGHT PROX SCLA PSV: 153.2 CM/SEC
BH CV XLRA MEAS RIGHT VERTEBRAL A PSV: 48.7 CM/SEC
BH CVPROX RIGHT ICA HIDDEN LRR: 1 CM
GLUCOSE BLDC GLUCOMTR-MCNC: 109 MG/DL (ref 70–130)
GLUCOSE BLDC GLUCOMTR-MCNC: 159 MG/DL (ref 70–130)
GLUCOSE BLDC GLUCOMTR-MCNC: 168 MG/DL (ref 70–130)
RIGHT ARM BP: NORMAL MMHG

## 2019-12-12 PROCEDURE — 037K34Z DILATION OF RIGHT INTERNAL CAROTID ARTERY WITH DRUG-ELUTING INTRALUMINAL DEVICE, PERCUTANEOUS APPROACH: ICD-10-PCS | Performed by: RADIOLOGY

## 2019-12-12 PROCEDURE — B31B1ZZ FLUOROSCOPY OF LEFT EXTERNAL CAROTID ARTERY USING LOW OSMOLAR CONTRAST: ICD-10-PCS | Performed by: RADIOLOGY

## 2019-12-12 PROCEDURE — C1894 INTRO/SHEATH, NON-LASER: HCPCS | Performed by: RADIOLOGY

## 2019-12-12 PROCEDURE — 63710000001 INSULIN LISPRO (HUMAN) PER 5 UNITS: Performed by: NURSE PRACTITIONER

## 2019-12-12 PROCEDURE — C1769 GUIDE WIRE: HCPCS | Performed by: RADIOLOGY

## 2019-12-12 PROCEDURE — B3171ZZ FLUOROSCOPY OF LEFT INTERNAL CAROTID ARTERY USING LOW OSMOLAR CONTRAST: ICD-10-PCS | Performed by: RADIOLOGY

## 2019-12-12 PROCEDURE — 94640 AIRWAY INHALATION TREATMENT: CPT

## 2019-12-12 PROCEDURE — 94799 UNLISTED PULMONARY SVC/PX: CPT

## 2019-12-12 PROCEDURE — 82962 GLUCOSE BLOOD TEST: CPT

## 2019-12-12 PROCEDURE — 25010000002 MIDAZOLAM PER 1 MG: Performed by: RADIOLOGY

## 2019-12-12 PROCEDURE — 99233 SBSQ HOSP IP/OBS HIGH 50: CPT | Performed by: INTERNAL MEDICINE

## 2019-12-12 PROCEDURE — 25010000002 HEPARIN (PORCINE) PER 1000 UNITS: Performed by: RADIOLOGY

## 2019-12-12 PROCEDURE — 25010000002 FENTANYL CITRATE (PF) 100 MCG/2ML SOLUTION: Performed by: RADIOLOGY

## 2019-12-12 PROCEDURE — C1760 CLOSURE DEV, VASC: HCPCS | Performed by: RADIOLOGY

## 2019-12-12 PROCEDURE — 93880 EXTRACRANIAL BILAT STUDY: CPT

## 2019-12-12 PROCEDURE — 63710000001 DIPHENHYDRAMINE PER 50 MG: Performed by: PHYSICIAN ASSISTANT

## 2019-12-12 PROCEDURE — 85347 COAGULATION TIME ACTIVATED: CPT

## 2019-12-12 PROCEDURE — 0 IODIXANOL PER 1 ML: Performed by: RADIOLOGY

## 2019-12-12 PROCEDURE — B3141ZZ FLUOROSCOPY OF LEFT COMMON CAROTID ARTERY USING LOW OSMOLAR CONTRAST: ICD-10-PCS | Performed by: RADIOLOGY

## 2019-12-12 PROCEDURE — C1876 STENT, NON-COA/NON-COV W/DEL: HCPCS | Performed by: RADIOLOGY

## 2019-12-12 PROCEDURE — C1884 EMBOLIZATION PROTECT SYST: HCPCS | Performed by: RADIOLOGY

## 2019-12-12 PROCEDURE — B31F1ZZ FLUOROSCOPY OF LEFT VERTEBRAL ARTERY USING LOW OSMOLAR CONTRAST: ICD-10-PCS | Performed by: RADIOLOGY

## 2019-12-12 PROCEDURE — 63710000001 PREDNISONE PER 1 MG: Performed by: PHYSICIAN ASSISTANT

## 2019-12-12 PROCEDURE — 93880 EXTRACRANIAL BILAT STUDY: CPT | Performed by: INTERNAL MEDICINE

## 2019-12-12 DEVICE — CLOSED CELL SELF-EXPANDING STENT
Type: IMPLANTABLE DEVICE | Status: FUNCTIONAL
Brand: CAROTID WALLSTENT®

## 2019-12-12 RX ORDER — CLOPIDOGREL BISULFATE 75 MG/1
75 TABLET ORAL DAILY
Status: DISCONTINUED | OUTPATIENT
Start: 2019-12-13 | End: 2019-12-13 | Stop reason: HOSPADM

## 2019-12-12 RX ORDER — BUDESONIDE AND FORMOTEROL FUMARATE DIHYDRATE 160; 4.5 UG/1; UG/1
2 AEROSOL RESPIRATORY (INHALATION)
Status: DISCONTINUED | OUTPATIENT
Start: 2019-12-12 | End: 2019-12-13 | Stop reason: HOSPADM

## 2019-12-12 RX ORDER — DIPHENHYDRAMINE HCL 25 MG
50 CAPSULE ORAL ONCE
Status: COMPLETED | OUTPATIENT
Start: 2019-12-12 | End: 2019-12-12

## 2019-12-12 RX ORDER — ISOSORBIDE DINITRATE 20 MG/1
10 TABLET ORAL NIGHTLY
Status: DISCONTINUED | OUTPATIENT
Start: 2019-12-12 | End: 2019-12-13 | Stop reason: HOSPADM

## 2019-12-12 RX ORDER — SODIUM CHLORIDE 9 MG/ML
75 INJECTION, SOLUTION INTRAVENOUS CONTINUOUS
Status: ACTIVE | OUTPATIENT
Start: 2019-12-12 | End: 2019-12-12

## 2019-12-12 RX ORDER — SODIUM CHLORIDE 0.9 % (FLUSH) 0.9 %
3 SYRINGE (ML) INJECTION EVERY 12 HOURS SCHEDULED
Status: DISCONTINUED | OUTPATIENT
Start: 2019-12-12 | End: 2019-12-13 | Stop reason: HOSPADM

## 2019-12-12 RX ORDER — DEXTROSE MONOHYDRATE 25 G/50ML
25 INJECTION, SOLUTION INTRAVENOUS
Status: DISCONTINUED | OUTPATIENT
Start: 2019-12-12 | End: 2019-12-13 | Stop reason: HOSPADM

## 2019-12-12 RX ORDER — SODIUM CHLORIDE 0.9 % (FLUSH) 0.9 %
1-10 SYRINGE (ML) INJECTION AS NEEDED
Status: DISCONTINUED | OUTPATIENT
Start: 2019-12-12 | End: 2019-12-12

## 2019-12-12 RX ORDER — IODIXANOL 320 MG/ML
INJECTION, SOLUTION INTRAVASCULAR AS NEEDED
Status: DISCONTINUED | OUTPATIENT
Start: 2019-12-12 | End: 2019-12-12 | Stop reason: HOSPADM

## 2019-12-12 RX ORDER — ALBUTEROL SULFATE 2.5 MG/3ML
2.5 SOLUTION RESPIRATORY (INHALATION) AS NEEDED
Status: DISCONTINUED | OUTPATIENT
Start: 2019-12-12 | End: 2019-12-13 | Stop reason: HOSPADM

## 2019-12-12 RX ORDER — SODIUM CHLORIDE 9 MG/ML
50 INJECTION, SOLUTION INTRAVENOUS CONTINUOUS
Status: DISCONTINUED | OUTPATIENT
Start: 2019-12-12 | End: 2019-12-13 | Stop reason: HOSPADM

## 2019-12-12 RX ORDER — FAMOTIDINE 20 MG/1
20 TABLET, FILM COATED ORAL 2 TIMES DAILY
Status: DISCONTINUED | OUTPATIENT
Start: 2019-12-12 | End: 2019-12-13 | Stop reason: HOSPADM

## 2019-12-12 RX ORDER — ASCORBIC ACID 500 MG
500 TABLET ORAL DAILY
Status: DISCONTINUED | OUTPATIENT
Start: 2019-12-13 | End: 2019-12-13 | Stop reason: HOSPADM

## 2019-12-12 RX ORDER — ASPIRIN 81 MG/1
81 TABLET, CHEWABLE ORAL DAILY
Status: DISCONTINUED | OUTPATIENT
Start: 2019-12-13 | End: 2019-12-13 | Stop reason: HOSPADM

## 2019-12-12 RX ORDER — LABETALOL HYDROCHLORIDE 5 MG/ML
20 INJECTION, SOLUTION INTRAVENOUS EVERY 4 HOURS PRN
Status: DISCONTINUED | OUTPATIENT
Start: 2019-12-12 | End: 2019-12-13 | Stop reason: HOSPADM

## 2019-12-12 RX ORDER — NITROGLYCERIN 0.4 MG/1
0.4 TABLET SUBLINGUAL
Status: DISCONTINUED | OUTPATIENT
Start: 2019-12-12 | End: 2019-12-13 | Stop reason: HOSPADM

## 2019-12-12 RX ORDER — LEVOTHYROXINE SODIUM 0.03 MG/1
25 TABLET ORAL
Status: DISCONTINUED | OUTPATIENT
Start: 2019-12-13 | End: 2019-12-13 | Stop reason: HOSPADM

## 2019-12-12 RX ORDER — FAMOTIDINE 10 MG/ML
20 INJECTION, SOLUTION INTRAVENOUS EVERY 12 HOURS SCHEDULED
Status: COMPLETED | OUTPATIENT
Start: 2019-12-12 | End: 2019-12-12

## 2019-12-12 RX ORDER — HEPARIN SODIUM 1000 [USP'U]/ML
INJECTION, SOLUTION INTRAVENOUS; SUBCUTANEOUS AS NEEDED
Status: DISCONTINUED | OUTPATIENT
Start: 2019-12-12 | End: 2019-12-12 | Stop reason: HOSPADM

## 2019-12-12 RX ORDER — ALPRAZOLAM 1 MG/1
1 TABLET ORAL 2 TIMES DAILY
Status: DISCONTINUED | OUTPATIENT
Start: 2019-12-12 | End: 2019-12-13 | Stop reason: HOSPADM

## 2019-12-12 RX ORDER — FENTANYL CITRATE 50 UG/ML
INJECTION, SOLUTION INTRAMUSCULAR; INTRAVENOUS AS NEEDED
Status: DISCONTINUED | OUTPATIENT
Start: 2019-12-12 | End: 2019-12-12 | Stop reason: HOSPADM

## 2019-12-12 RX ORDER — MIDAZOLAM HYDROCHLORIDE 1 MG/ML
INJECTION INTRAMUSCULAR; INTRAVENOUS AS NEEDED
Status: DISCONTINUED | OUTPATIENT
Start: 2019-12-12 | End: 2019-12-12 | Stop reason: HOSPADM

## 2019-12-12 RX ORDER — NICOTINE POLACRILEX 4 MG
15 LOZENGE BUCCAL
Status: DISCONTINUED | OUTPATIENT
Start: 2019-12-12 | End: 2019-12-13 | Stop reason: HOSPADM

## 2019-12-12 RX ORDER — SODIUM CHLORIDE 0.9 % (FLUSH) 0.9 %
10 SYRINGE (ML) INJECTION AS NEEDED
Status: DISCONTINUED | OUTPATIENT
Start: 2019-12-12 | End: 2019-12-13 | Stop reason: HOSPADM

## 2019-12-12 RX ORDER — ACETAMINOPHEN 325 MG/1
650 TABLET ORAL EVERY 4 HOURS PRN
Status: DISCONTINUED | OUTPATIENT
Start: 2019-12-12 | End: 2019-12-13 | Stop reason: HOSPADM

## 2019-12-12 RX ORDER — ISOSORBIDE DINITRATE 20 MG/1
30 TABLET ORAL
Status: DISCONTINUED | OUTPATIENT
Start: 2019-12-13 | End: 2019-12-13 | Stop reason: HOSPADM

## 2019-12-12 RX ORDER — NICOTINE 21 MG/24HR
1 PATCH, TRANSDERMAL 24 HOURS TRANSDERMAL EVERY 24 HOURS
Status: ON HOLD | COMMUNITY
End: 2021-04-08

## 2019-12-12 RX ORDER — NICOTINE 21 MG/24HR
1 PATCH, TRANSDERMAL 24 HOURS TRANSDERMAL DAILY
Status: DISCONTINUED | OUTPATIENT
Start: 2019-12-13 | End: 2019-12-13 | Stop reason: HOSPADM

## 2019-12-12 RX ORDER — DULOXETIN HYDROCHLORIDE 60 MG/1
60 CAPSULE, DELAYED RELEASE ORAL DAILY
Status: DISCONTINUED | OUTPATIENT
Start: 2019-12-13 | End: 2019-12-13 | Stop reason: HOSPADM

## 2019-12-12 RX ORDER — LIDOCAINE HYDROCHLORIDE 10 MG/ML
INJECTION, SOLUTION EPIDURAL; INFILTRATION; INTRACAUDAL; PERINEURAL AS NEEDED
Status: DISCONTINUED | OUTPATIENT
Start: 2019-12-12 | End: 2019-12-12 | Stop reason: HOSPADM

## 2019-12-12 RX ORDER — PANTOPRAZOLE SODIUM 40 MG/1
40 TABLET, DELAYED RELEASE ORAL
Status: DISCONTINUED | OUTPATIENT
Start: 2019-12-13 | End: 2019-12-12

## 2019-12-12 RX ORDER — PREDNISONE 20 MG/1
40 TABLET ORAL ONCE
Status: COMPLETED | OUTPATIENT
Start: 2019-12-12 | End: 2019-12-12

## 2019-12-12 RX ORDER — SODIUM CHLORIDE 0.9 % (FLUSH) 0.9 %
3 SYRINGE (ML) INJECTION EVERY 12 HOURS SCHEDULED
Status: DISCONTINUED | OUTPATIENT
Start: 2019-12-12 | End: 2019-12-12

## 2019-12-12 RX ORDER — FAMOTIDINE 20 MG/1
20 TABLET, FILM COATED ORAL EVERY 12 HOURS SCHEDULED
Status: COMPLETED | OUTPATIENT
Start: 2019-12-12 | End: 2019-12-12

## 2019-12-12 RX ADMIN — SODIUM CHLORIDE 5 MG/HR: 9 INJECTION, SOLUTION INTRAVENOUS at 19:40

## 2019-12-12 RX ADMIN — METOPROLOL TARTRATE 25 MG: 25 TABLET ORAL at 20:14

## 2019-12-12 RX ADMIN — PREDNISONE 40 MG: 20 TABLET ORAL at 08:30

## 2019-12-12 RX ADMIN — ACETAMINOPHEN 650 MG: 325 TABLET ORAL at 18:41

## 2019-12-12 RX ADMIN — IPRATROPIUM BROMIDE 0.5 MG: 0.5 SOLUTION RESPIRATORY (INHALATION) at 12:22

## 2019-12-12 RX ADMIN — BUDESONIDE AND FORMOTEROL FUMARATE DIHYDRATE 2 PUFF: 160; 4.5 AEROSOL RESPIRATORY (INHALATION) at 19:11

## 2019-12-12 RX ADMIN — DIPHENHYDRAMINE HYDROCHLORIDE 50 MG: 25 CAPSULE ORAL at 08:30

## 2019-12-12 RX ADMIN — ISOSORBIDE DINITRATE 10 MG: 20 TABLET ORAL at 20:15

## 2019-12-12 RX ADMIN — SODIUM CHLORIDE 50 ML/HR: 9 INJECTION, SOLUTION INTRAVENOUS at 08:31

## 2019-12-12 RX ADMIN — ALPRAZOLAM 1 MG: 1 TABLET ORAL at 20:15

## 2019-12-12 RX ADMIN — SODIUM CHLORIDE, PRESERVATIVE FREE 3 ML: 5 INJECTION INTRAVENOUS at 20:14

## 2019-12-12 RX ADMIN — IPRATROPIUM BROMIDE 0.5 MG: 0.5 SOLUTION RESPIRATORY (INHALATION) at 15:43

## 2019-12-12 RX ADMIN — FAMOTIDINE 20 MG: 20 TABLET ORAL at 20:15

## 2019-12-12 RX ADMIN — IPRATROPIUM BROMIDE 0.5 MG: 0.5 SOLUTION RESPIRATORY (INHALATION) at 19:10

## 2019-12-12 RX ADMIN — INSULIN LISPRO 2 UNITS: 100 INJECTION, SOLUTION INTRAVENOUS; SUBCUTANEOUS at 20:13

## 2019-12-12 RX ADMIN — SODIUM CHLORIDE 50 ML/HR: 9 INJECTION, SOLUTION INTRAVENOUS at 16:32

## 2019-12-12 RX ADMIN — FAMOTIDINE 20 MG: 20 TABLET ORAL at 08:30

## 2019-12-12 NOTE — PROGRESS NOTES
Intensivist Note     12/12/2019  Hospital Day: 0  Day of Surgery  ICU Stays Timeline      Dates and times are displayed in the time zone of the admission          Hospital Admission: 12/12/19 0715 - Current  ICU stays: 1      In Date/Time Event Department ICU Stay Duration     12/12/19 0715 Admission  [facility]      12/12/19 0910 Transfer In  [facility]      12/12/19 1007 Transfer In [facility] ICU 21 minutes              Ms. Ira Stinson, 59 y.o. female is followed for:    Carotid stenosis s/p right carotid stent per Dr. Nazario 12/12/19    Hx carotid artery disease s/p R CEAx2 (2011)    CAD s/p multiple stents; s/p CABG x3 (2000)    Ongoing tobacco use    COPD     Nocturnal hypoxemia for which patient is on nocturnal CPAP and oxygen    Hypertension    Dyslipidemia    T2DM on Metformin    Hypothyroidism on Rx    Anxiety    SUBJECTIVE     Ira Stinson is a 59 y.o. white female active smoker who presents to [facility] 12/12/19 for a scheduled right carotid stent per Dr. Nazario.    The patient was referred to Dr. Nazario in November of this year for known carotid artery disease status post right CEA 3/14/2011 by Dr. Sharif.  Also has known CAD s/p CABG x3 in 2000 with multiple stents in the years following. These issues are complicated by the fact that the patient is still actively smoking.  Other medical problems include COPD, hypertension, and hyperlipidemia.  Apparently had an abnormal carotid duplex on routine follow-up then underwent CTA of the neck 11/1/2019 in [place].  This was reviewed by Dr. Nazario, and when compared to prior scan on 5/4/2018 her right carotid disease had significantly progressed with probable high-grade stenosis (was not however symptomatic at this time).  He discussed risk/benefits/alternatives to intervention with the patient, who ultimately elected to proceed with right carotid angioplasty/stent placement.  The procedure was performed 12/12/19 and the patient was transferred to the neurologic  "ICU for further monitoring.    On arrival to ICU the patient is hemodynamically stable and states she is feeling well. She denies shortness of breath, dizziness, blurred or double vision, numbness/tingling, chest pain, abdominal discomfort, or nausea/vomiting. She is on home CPAP with associated oxygen at night which was started by Dr. Garvey in [place] who is her pulmonologist. This was started because of nocturnal hypoxemia.  Patient denies having AMBER to me and states the CPAP and oxygen were done just because she drops her sats at night.  As noted above she currently smoke 1/2 PPD.  She is requesting a nicotine patch.    ROS: Per subjective, all other systems reviewed and were negative.    The patient's relevant PMH, PSH, FH, and SH were reviewed and updated in Epic as appropriate. Allergies and Medications reviewed.    OBJECTIVE     /67   Pulse 77   Temp 98.2 °F (36.8 °C) (Axillary)   Resp 16   Ht 167.6 cm (65.98\")   Wt 80.7 kg (178 lb)   SpO2 90%   BMI 28.74 kg/m²      Flow (L/min): 3    Flowsheet Rows      First Filed Value   Admission Height  167.6 cm (66\") Documented at 12/12/2019 0741   Admission Weight  81 kg (178 lb 9.2 oz) Documented at 12/12/2019 0741        Intake & Output (last day)       12/11 0701 - 12/12 0700 12/12 0701 - 12/13 0700          Urine Unmeasured Occurrence  2 x          Exam:  General Exam:  Well-developed well-nourished white female in NAD  HEENT: Pupils equal and reactive. Nose and throat clear.  Neck:                          Supple, no JVD, thyromegaly, or adenopathy  Lungs: Clear anteriorly, laterally, posteriorly  Cardiovascular: Regular rate and rhythm without murmurs or gallops.  Abdomen: Soft nontender without organomegaly or masses.   and rectal: Deferred.  Extremities: No cyanosis, clubbing, or edema. Right groin sheath site soft with no hematoma.  Skin is clean and dry  Neurologic:                 Symmetric strength. No focal " deficits.    INFUSIONS    niCARdipine 5-15 mg/hr Last Rate: Stopped (12/12/19 1105)   phenylephrine 0.5-3 mcg/kg/min Last Rate: Stopped (12/12/19 1017)   sodium chloride 50 mL/hr Last Rate: 50 mL/hr (12/12/19 0831)           Invalid input(s): NEUTOPHILPCT                No results found for: SEDRATE  No results found for: BNP  No results found for: CKTOTAL, CKMB, CKMBINDEX, TROPONINI, TROPONINT  No results found for: TSH  No results found for: LACTATE  No results found for: CORTISOL        I reviewed the patient's results, images and medication.    Assessment/Plan   ASSESSMENT        Carotid stenosis s/p right carotid stent per Dr. Nazario 12/12/19    Hx carotid artery disease s/p R CEAx2 (2011)    CAD s/p multiple stents; s/p CABG x3 (2000)    Ongoing tobacco use    COPD     Nocturnal hypoxemia for which patient is on nocturnal CPAP and oxygen    Hypertension    Dyslipidemia    T2DM on Metformin    Hypothyroidism on Rx    Anxiety      DISCUSSION: Appears to be doing well.  Suspect she will be ready for discharge tomorrow but defer to Dr. Nazario.  Reviewed absolute necessity for her to completely stop smoking.  While here will continue glycemic control and blood pressure and encouraged her to stop smoking.  Should receive a Prevnar vaccination, and then a Pneumovax 1 year later (prior to 63 years old in view of her COPD, CAD, extracranial cerebrovascular disease, etc.).    PLAN     1. Follow in ICU  2. Neurovascular checks per protocol  3. Resume diet  4. BP control; Cardene and Brady PRN  5. Home meds restarted  6. AM labs, CXR  7. Sliding scale insulin coverage  8. Smoking cessation teaching; nicotine patch  9. Full code  10. Continue nocturnal BiPAP with oxygen  11. Prevnar prior to discharge     Plan of care and goals reviewed with mulitdisciplinary team at daily rounds.    I discussed the patient's findings and my recommendations with patient and family    High level of risk due to: severe exacerbation of chronic  illness, illness with threat to life or bodily function and parenteral controlled substances.    Time spent Critical care 30 min (It does not include procedure time).    Augustine Fields MD  Intensive Care Medicine  12/12/19 3:15 PM

## 2019-12-12 NOTE — BRIEF OP NOTE
"CAROTID STENT  Progress Note    Ira Stinson  12/12/2019    Pre-op Diagnosis:   Carotid stenosis, asymptomatic, right [I65.21]       Post-Op Diagnosis Codes:  Carotid stenosis, asymptomatic, right [I65.21]    Procedure/CPT® Codes:      Procedure(s):  Carotid Stent    Surgeon(s):  Qamar Nazario MD    Anesthesia: Sedation    Staff:   Documenter: June Elena  Invasive Nurse: Kaila Mari RN  Invasive Technologist: Lea Phillips    Estimated Blood Loss: minimal    Urine Voided: * No values recorded between 12/12/2019  9:10 AM and 12/12/2019  9:52 AM *    Specimens:                None          Drains: * No LDAs found *    Findings: Recurrent, high-grade (greater than 70%) stenosis involving the right internal carotid artery.  The patient is status post prior CEA, and thus a \"high surgical risk\" for endarterectomy.  This lesion was treated with angioplasty/stent placement, restoring a near normal caliber lumen and resulting in improved perfusion to the right cerebral hemisphere.    Complications: None apparent      Qamar Nazario MD     Date: 12/12/2019  Time: 10:00 AM      "

## 2019-12-12 NOTE — PLAN OF CARE
No neuro deficits. VSS except slightly hypertensive. Cardene running. On 3L NC. Groin site stable.

## 2019-12-12 NOTE — PLAN OF CARE
Neurointerventional Metrics    Last Known Well:  Unknown    [facility] Arrival:  1441    Initial NIHSS: 10    Baseline MRS:  0    IV tPA:  No, unknown time of onset    CT Head: 1444    CT Perfusion:  1455    Arrival to Cath Lab:  1544    Groin Access: 1559    Guide Catheter Placement:  1603    Microcatheter Placement:  1604    Microcatheter Injection:  1605    Stent Retriever Deployment:  1608    Stent Retriever Removal: 1613    Reperfusion: 1613    Final Angiogram:  1617    End of Procedure:  1620    Initial TICI flow:  2A    Final TICI flow:  3    Emboli to New Vascular Territory:  No

## 2019-12-13 ENCOUNTER — APPOINTMENT (OUTPATIENT)
Dept: GENERAL RADIOLOGY | Facility: HOSPITAL | Age: 59
End: 2019-12-13

## 2019-12-13 VITALS
WEIGHT: 178 LBS | DIASTOLIC BLOOD PRESSURE: 72 MMHG | BODY MASS INDEX: 28.61 KG/M2 | HEART RATE: 82 BPM | RESPIRATION RATE: 18 BRPM | TEMPERATURE: 98.3 F | HEIGHT: 66 IN | OXYGEN SATURATION: 88 % | SYSTOLIC BLOOD PRESSURE: 144 MMHG

## 2019-12-13 LAB
ANION GAP SERPL CALCULATED.3IONS-SCNC: 11 MMOL/L (ref 5–15)
BASOPHILS # BLD AUTO: 0.07 10*3/MM3 (ref 0–0.2)
BASOPHILS NFR BLD AUTO: 0.9 % (ref 0–1.5)
BUN BLD-MCNC: 8 MG/DL (ref 8–23)
BUN/CREAT SERPL: 17.8 (ref 7–25)
CALCIUM SPEC-SCNC: 8.8 MG/DL (ref 8.6–10.5)
CHLORIDE SERPL-SCNC: 104 MMOL/L (ref 98–107)
CO2 SERPL-SCNC: 23 MMOL/L (ref 22–29)
CREAT BLD-MCNC: 0.45 MG/DL (ref 0.57–1)
DEPRECATED RDW RBC AUTO: 56 FL (ref 37–54)
EOSINOPHIL # BLD AUTO: 0.23 10*3/MM3 (ref 0–0.4)
EOSINOPHIL NFR BLD AUTO: 3.1 % (ref 0.3–6.2)
ERYTHROCYTE [DISTWIDTH] IN BLOOD BY AUTOMATED COUNT: 17.6 % (ref 12.3–15.4)
GFR SERPL CREATININE-BSD FRML MDRD: 142 ML/MIN/1.73
GLUCOSE BLD-MCNC: 171 MG/DL (ref 65–99)
GLUCOSE BLDC GLUCOMTR-MCNC: 87 MG/DL (ref 70–130)
HBA1C MFR BLD: 6.6 % (ref 4.8–5.6)
HCT VFR BLD AUTO: 36.2 % (ref 34–46.6)
HGB BLD-MCNC: 10.5 G/DL (ref 12–15.9)
IMM GRANULOCYTES # BLD AUTO: 0.03 10*3/MM3 (ref 0–0.05)
IMM GRANULOCYTES NFR BLD AUTO: 0.4 % (ref 0–0.5)
LYMPHOCYTES # BLD AUTO: 1.25 10*3/MM3 (ref 0.7–3.1)
LYMPHOCYTES NFR BLD AUTO: 16.9 % (ref 19.6–45.3)
MCH RBC QN AUTO: 25.4 PG (ref 26.6–33)
MCHC RBC AUTO-ENTMCNC: 29 G/DL (ref 31.5–35.7)
MCV RBC AUTO: 87.4 FL (ref 79–97)
MONOCYTES # BLD AUTO: 0.7 10*3/MM3 (ref 0.1–0.9)
MONOCYTES NFR BLD AUTO: 9.4 % (ref 5–12)
NEUTROPHILS # BLD AUTO: 5.13 10*3/MM3 (ref 1.7–7)
NEUTROPHILS NFR BLD AUTO: 69.3 % (ref 42.7–76)
NRBC BLD AUTO-RTO: 0 /100 WBC (ref 0–0.2)
PLATELET # BLD AUTO: 238 10*3/MM3 (ref 140–450)
PMV BLD AUTO: 10.6 FL (ref 6–12)
POTASSIUM BLD-SCNC: 3.4 MMOL/L (ref 3.5–5.2)
RBC # BLD AUTO: 4.14 10*6/MM3 (ref 3.77–5.28)
SODIUM BLD-SCNC: 138 MMOL/L (ref 136–145)
TROPONIN T SERPL-MCNC: <0.01 NG/ML (ref 0–0.03)
WBC NRBC COR # BLD: 7.41 10*3/MM3 (ref 3.4–10.8)

## 2019-12-13 PROCEDURE — 85025 COMPLETE CBC W/AUTO DIFF WBC: CPT | Performed by: RADIOLOGY

## 2019-12-13 PROCEDURE — 94799 UNLISTED PULMONARY SVC/PX: CPT

## 2019-12-13 PROCEDURE — 84484 ASSAY OF TROPONIN QUANT: CPT | Performed by: RADIOLOGY

## 2019-12-13 PROCEDURE — 25010000002 PNEUMOCOCCAL CONJ. 13-VALENT SUSPENSION: Performed by: INTERNAL MEDICINE

## 2019-12-13 PROCEDURE — 80048 BASIC METABOLIC PNL TOTAL CA: CPT | Performed by: RADIOLOGY

## 2019-12-13 PROCEDURE — 71045 X-RAY EXAM CHEST 1 VIEW: CPT

## 2019-12-13 PROCEDURE — 90670 PCV13 VACCINE IM: CPT | Performed by: INTERNAL MEDICINE

## 2019-12-13 PROCEDURE — 83036 HEMOGLOBIN GLYCOSYLATED A1C: CPT | Performed by: NURSE PRACTITIONER

## 2019-12-13 PROCEDURE — G0009 ADMIN PNEUMOCOCCAL VACCINE: HCPCS | Performed by: INTERNAL MEDICINE

## 2019-12-13 PROCEDURE — 82962 GLUCOSE BLOOD TEST: CPT

## 2019-12-13 RX ADMIN — METOPROLOL TARTRATE 25 MG: 25 TABLET ORAL at 08:17

## 2019-12-13 RX ADMIN — OXYCODONE HYDROCHLORIDE AND ACETAMINOPHEN 500 MG: 500 TABLET ORAL at 08:17

## 2019-12-13 RX ADMIN — CLOPIDOGREL BISULFATE 75 MG: 75 TABLET ORAL at 08:17

## 2019-12-13 RX ADMIN — PNEUMOCOCCAL 13-VALENT CONJUGATE VACCINE 0.5 ML: 2.2; 2.2; 2.2; 2.2; 2.2; 4.4; 2.2; 2.2; 2.2; 2.2; 2.2; 2.2; 2.2 INJECTION, SUSPENSION INTRAMUSCULAR at 10:55

## 2019-12-13 RX ADMIN — SODIUM CHLORIDE 50 ML/HR: 9 INJECTION, SOLUTION INTRAVENOUS at 06:21

## 2019-12-13 RX ADMIN — LABETALOL 20 MG/4 ML (5 MG/ML) INTRAVENOUS SYRINGE 20 MG: at 02:38

## 2019-12-13 RX ADMIN — FAMOTIDINE 20 MG: 20 TABLET ORAL at 08:17

## 2019-12-13 RX ADMIN — ACETAMINOPHEN 650 MG: 325 TABLET ORAL at 02:42

## 2019-12-13 RX ADMIN — ACETAMINOPHEN 650 MG: 325 TABLET ORAL at 10:15

## 2019-12-13 RX ADMIN — IPRATROPIUM BROMIDE 0.5 MG: 0.5 SOLUTION RESPIRATORY (INHALATION) at 07:39

## 2019-12-13 RX ADMIN — ASPIRIN 81 MG 81 MG: 81 TABLET ORAL at 08:17

## 2019-12-13 RX ADMIN — DULOXETINE HYDROCHLORIDE 60 MG: 60 CAPSULE, DELAYED RELEASE ORAL at 08:17

## 2019-12-13 RX ADMIN — ALPRAZOLAM 1 MG: 1 TABLET ORAL at 08:17

## 2019-12-13 RX ADMIN — NICOTINE 1 PATCH: 21 PATCH, EXTENDED RELEASE TRANSDERMAL at 08:17

## 2019-12-13 RX ADMIN — LEVOTHYROXINE SODIUM 25 MCG: 25 TABLET ORAL at 05:50

## 2019-12-13 RX ADMIN — ISOSORBIDE DINITRATE 30 MG: 20 TABLET ORAL at 08:17

## 2019-12-13 RX ADMIN — BUDESONIDE AND FORMOTEROL FUMARATE DIHYDRATE 2 PUFF: 160; 4.5 AEROSOL RESPIRATORY (INHALATION) at 07:39

## 2019-12-13 NOTE — PLAN OF CARE
Problem: Patient Care Overview  Goal: Plan of Care Review  Flowsheets (Taken 12/13/2019 0431)  Progress: improving  Plan of Care Reviewed With: patient  Outcome Summary: Cardene drip currently off. Pt was given prn Labetalol once. Pt report dull intermitten headache. Adequate UOP. Pt ambulated to BR twice. R groin C/D/I. Good pulses. No neuro changes. Tylor continue to monitor.

## 2019-12-13 NOTE — DISCHARGE SUMMARY
DISCHARGE SUMMARY  PATIENT:  KATHERINE BANEGAS  YOB: 1960  VISIT ID:  [id number]  PRIMARY CARE:  Ernesto Whitfield MD  ADMITTING PHYSICIAN:  Qamar Nazario MD    DATE OF ADMISSION:  12/12/2019  DATE OF DISCHARGE: 12/13/2019      ADMITTING DIAGNOSIS:  Carotid stenosis, asymptomatic, right    DISCHARGE DIAGNOSIS:  Carotid stenosis, asymptomatic, right    Past Medical History:   Diagnosis Date   • Anxiety    • Arthritis    • Autoimmune disorder (CMS/[facility])    • Carotid artery stenosis    • Chest pain    • COPD (chronic obstructive pulmonary disease) (CMS/[facility])    • Coronary artery disease     stents times 6   • Depression    • Diabetes (CMS/[facility])     type 2 dm, check blood sugar 2 to 3 times a week   • GERD (gastroesophageal reflux disease)    • Goiter    • Heart murmur    • Hiatal hernia    • History of transfusion    • Hyperlipidemia    • Hypertension    • Hypothyroidism    • Iron deficiency anemia    • Leaky heart valve    • Lichen planus    • Palpitations     with previous monitoring indicating short-lived episodes of SVT and frequent PVCs.   • Peripheral vascular disease (CMS/[facility])     with history of right carotid endarterectomy and follow-up right endarterectomy, March 2011.   • PONV (postoperative nausea and vomiting)     severe   • PVC (premature ventricular contraction)    • PVD (peripheral vascular disease) (CMS/[facility])    • Shortness of breath    • SVT (supraventricular tachycardia) (CMS/[facility])    • Wears dentures     upper   • Wears reading eyeglasses          PROCEDURES:  1. Carotid stent    BRIEF HOSPITAL COURSE:  Ms. Banegas is a 59 y.o. female who is known to the neuro interventional service, having seen previously for consultation regarding recurrent (prior carotid endarterectomy), high-grade (greater than 70%) stenosis involving her right internal carotid artery.  She gave no history of stroke or TIA-like symptoms, but is concerned about possible stroke risk.  Additionally, her right carotid  disease had progressed compared to her CT angiogram on 5/4/2019.  Due to her prior endarterectomy, she is deemed a high surgical risk for endarterectomy, and therefore elected to undergo an angioplasty and stent placement on 12/12/2019.  The angiography confirmed the presence of a recurrent, high-grade (greater than 70%) stenosis involving the right internal carotid artery.  The lesion was treated with an angioplasty/stent placement, restoring a near normal caliber lumen and resulting improved perfusion to the right cerebral hemisphere.  He was discharged home on 12/13/2019 at his neurological baseline.     DISCHARGE MEDICATIONS:     Discharge Medications      Continue These Medications      Instructions Start Date   Alirocumab 150 MG/ML solution auto-injector  Commonly known as:  PRALUENT   1 applicator, Subcutaneous, Every 14 Days      ALPRAZolam 1 MG tablet  Commonly known as:  XANAX   1 mg, Oral, 2 Times Daily      aspirin 81 MG tablet   81 mg, Oral, Daily      CYMBALTA 60 MG capsule  Generic drug:  DULoxetine   60 mg, Oral, Daily      FERROUS SULFATE CR PO   Oral      folic acid 1 MG tablet  Commonly known as:  FOLVITE   1 mg, Oral, Daily      furosemide 20 MG tablet  Commonly known as:  LASIX   Oral, As Needed      isosorbide dinitrate 30 MG tablet  Commonly known as:  ISORDIL   TAKE 1 TABLET BY MOUTH IN THE MORNING AND 1 2 (ONE HALF) TABLET IN THE EVENING      lansoprazole 30 MG capsule  Commonly known as:  PREVACID   30 mg, Oral, Daily      levothyroxine 25 MCG tablet  Commonly known as:  SYNTHROID, LEVOTHROID   25 mcg, Oral, Daily      metFORMIN  MG 24 hr tablet  Commonly known as:  GLUCOPHAGE-XR   500 mg, Oral, Daily      metoprolol tartrate 50 MG tablet  Commonly known as:  LOPRESSOR   50 mg, Oral, 2 Times Daily      nicotine 21 MG/24HR patch  Commonly known as:  NICODERM CQ   1 patch, Transdermal, Every 24 Hours      NITROSTAT 0.4 MG SL tablet  Generic drug:  nitroglycerin   0.4 mg, Sublingual,  Every 5 Minutes PRN      PLAVIX 75 MG tablet  Generic drug:  clopidogrel   75 mg, Oral, Daily      SPIRIVA RESPIMAT 2.5 MCG/ACT aerosol solution inhaler  Generic drug:  tiotropium bromide monohydrate   INHALE 2 SPRAY(S) BY MOUTH ONCE DAILY      SYMBICORT 160-4.5 MCG/ACT inhaler  Generic drug:  budesonide-formoterol   Symbicort 160-4.5 MCG/ACT Inhalation Aerosol; Patient Sig: Symbicort 160-4.5 MCG/ACT Inhalation Aerosol 2 puffs BID      VENTOLIN  (90 Base) MCG/ACT inhaler  Generic drug:  albuterol sulfate HFA   As Needed      vitamin C 500 MG tablet  Commonly known as:  ASCORBIC ACID   500 mg, Oral, Daily               ACTIVITY:  No lifting greater than 25 pounds for the next 5 days.  No bathing or soaking for the next 5 days, showering is okay.  Able to resume normal activity after 5 days.    DIET:  Progress as tolerated    FOLLOW UP:    Follow-up Information     Ernesto Whitfield MD .    Specialty:  Internal Medicine  Contact information:  [address]  [facility] [address]  [facility] [address] [phone]             Qamar Nazario MD Follow up in 1 month(s).    Specialties:  Neurosurgery, Neurosurgery  Why:  F/u in one month with Dr. Nazario in clinic  Contact information:  [address]  [facility] [address]  [facility] [address] [phone]                 Rose Marie Sutton PA-C

## 2019-12-13 NOTE — PROGRESS NOTES
Discharge Planning Assessment  [facility]     Patient Name: Ira Stinson  MRN: [id number]  Today's Date: 12/13/2019    Admit Date: 12/12/2019    Discharge Needs Assessment     Row Name 12/13/19 1028       Living Environment    Lives With  spouse    Current Living Arrangements  home/apartment/condo    Primary Care Provided by  self    Provides Primary Care For  no one    Family Caregiver if Needed  none    Quality of Family Relationships  involved;supportive    Able to Return to Prior Arrangements  yes       Resource/Environmental Concerns    Resource/Environmental Concerns  none       Transition Planning    Patient/Family Anticipates Transition to  home with family    Patient/Family Anticipated Services at Transition      Transportation Anticipated  family or friend will provide       Discharge Needs Assessment    Readmission Within the Last 30 Days  no previous admission in last 30 days    Concerns to be Addressed  discharge planning    Equipment Currently Used at Home  bipap/cpap    Anticipated Changes Related to Illness  none    Equipment Needed After Discharge  none        Discharge Plan     Row Name 12/13/19 1030       Plan    Plan  Home    Patient/Family in Agreement with Plan  yes    Plan Comments  Spoke with patient in room to initiate discharge planning.  She lives with her  in [facility].  She is independen with ADL's.  She has  a CPAP at home and has never had home health.  Ms. Stinson has RX coverage and has her scripts filled at [facility].  Her plan is to return home at discharge.  She denies any needs at this time.  Fawn Cabrera RN [phone]    Final Discharge Disposition Code  01 - home or self-care        Destination      Coordination has not been started for this encounter.      Durable Medical Equipment      Coordination has not been started for this encounter.      Dialysis/Infusion      Coordination has not been started for this encounter.      Home Medical Care       Coordination has not been started for this encounter.      Therapy      Coordination has not been started for this encounter.      Community Resources      Coordination has not been started for this encounter.        Expected Discharge Date and Time     Expected Discharge Date Expected Discharge Time    Dec 13, 2019         Demographic Summary     Row Name 12/13/19 1027       General Information    Admission Type  inpatient    Arrived From  PACU/recovery room    Referral Source  admission list    Reason for Consult  discharge planning    Preferred Language  English     Used During This Interaction  no    General Information Comments  PCP- Ernesto Whitfield        Functional Status     Row Name 12/13/19 1027       Functional Status    Usual Activity Tolerance  good    Current Activity Tolerance  good       Functional Status, IADL    Medications  independent    Meal Preparation  independent    Housekeeping  independent    Laundry  independent    Shopping  independent        Psychosocial    No documentation.       Abuse/Neglect    No documentation.       Legal     Row Name 12/13/19 1027       Financial/Legal    Finance Comments  Verified with patient that she has [employer] MCR.  No issues obtaining medications.        Substance Abuse    No documentation.       Patient Forms    No documentation.           Fawn Cabrera RN

## 2019-12-13 NOTE — PROGRESS NOTES
Ms. Stinson has done well following her carotid stent.  She remains neurologically intact.  Ambulating unassisted.  Tolerating p.o. intake very well.  She had an uneventful night in the ICU.  We will plan on discharging her later this a.m., and she will follow-up in the neuro interventional clinic in 1 month's time.  She will remain on a dual antiplatelet regimen.  Carotid stent is widely patent on post-intervention duplex.

## 2019-12-14 ENCOUNTER — READMISSION MANAGEMENT (OUTPATIENT)
Dept: CALL CENTER | Facility: HOSPITAL | Age: 59
End: 2019-12-14

## 2019-12-14 NOTE — OUTREACH NOTE
Prep Survey      Responses   Facility patient discharged from?  [facility]   Is patient eligible?  Yes   Discharge diagnosis  Carotid stenosis s/p right carotid stent per  Given 12/12/19   Does the patient have one of the following disease processes/diagnoses(primary or secondary)?  General Surgery   Does the patient have Home health ordered?  No   Is there a DME ordered?  No   Prep survey completed?  Yes          Kym Green RN

## 2019-12-16 ENCOUNTER — READMISSION MANAGEMENT (OUTPATIENT)
Dept: CALL CENTER | Facility: HOSPITAL | Age: 59
End: 2019-12-16

## 2019-12-16 NOTE — OUTREACH NOTE
General Surgery Week 1 Survey      Responses   Facility patient discharged from?  [facility]   Does the patient have one of the following disease processes/diagnoses(primary or secondary)?  General Surgery   Is there a successful TCM telephone encounter documented?  No   Week 1 attempt successful?  No   Unsuccessful attempts  Attempt 1          Kym Green RN

## 2019-12-17 ENCOUNTER — READMISSION MANAGEMENT (OUTPATIENT)
Dept: CALL CENTER | Facility: HOSPITAL | Age: 59
End: 2019-12-17

## 2019-12-17 NOTE — OUTREACH NOTE
General Surgery Week 1 Survey      Responses   Facility patient discharged from?  [facility]   Does the patient have one of the following disease processes/diagnoses(primary or secondary)?  General Surgery   Is there a successful TCM telephone encounter documented?  No   Week 1 attempt successful?  No   Unsuccessful attempts  Attempt 2          Lara Baca RN

## 2019-12-20 ENCOUNTER — READMISSION MANAGEMENT (OUTPATIENT)
Dept: CALL CENTER | Facility: HOSPITAL | Age: 59
End: 2019-12-20

## 2019-12-20 NOTE — OUTREACH NOTE
General Surgery Week 2 Survey      Responses   Facility patient discharged from?  [facility]   Does the patient have one of the following disease processes/diagnoses(primary or secondary)?  General Surgery   Week 2 attempt successful?  Yes   Call start time  1212   Call end time  1216   Discharge diagnosis  Carotid stenosis s/p right carotid stent per Dr. Nazario 12/12/19   Meds reviewed with patient/caregiver?  Yes   Is the patient having any side effects they believe may be caused by any medication additions or changes?  No   Does the patient have all medications related to this admission filled (includes all antibiotics, pain medications, etc.)  N/A   Is the patient taking all medications as directed (includes completed medication regime)?  Yes   Does the patient have a follow up appointment scheduled with their surgeon?  Yes   Has the patient kept scheduled appointments due by today?  N/A   Psychosocial issues?  No   Did the patient receive a copy of their discharge instructions?  Yes   Nursing interventions  Reviewed instructions with patient   What is the patient's perception of their health status since discharge?  Improving   Nursing interventions  Nurse provided patient education   Is the patient /caregiver able to teach back basic post-op care?  Lifting as instructed by MD in discharge instructions, Keep incision areas clean,dry and protected, Do not remove steri-strips   Is the patient/caregiver able to teach back signs and symptoms of incisional infection?  Fever, Increased drainage or bleeding, Increased redness, swelling or pain at the incisonal site   Is the patient/caregiver able to teach back steps to recovery at home?  Set small, achievable goals for return to baseline health, Rest and rebuild strength, gradually increase activity, Eat a well-balance diet   If the patient is a current smoker, are they able to teach back resources for cessation?  [phone] [Pt is a smoker]   Is the  patient/caregiver able to teach back the hierarchy of who to call/visit for symptoms/problems? PCP, Specialist, Home health nurse, Urgent Care, ED, 911  Yes   Week 2 call completed?  Yes          Leilani Sharma RN

## 2020-01-21 ENCOUNTER — OFFICE VISIT (OUTPATIENT)
Dept: CARDIOLOGY | Facility: CLINIC | Age: 60
End: 2020-01-21

## 2020-01-21 VITALS
WEIGHT: 178 LBS | BODY MASS INDEX: 28.61 KG/M2 | HEIGHT: 66 IN | DIASTOLIC BLOOD PRESSURE: 81 MMHG | SYSTOLIC BLOOD PRESSURE: 184 MMHG | OXYGEN SATURATION: 90 % | HEART RATE: 110 BPM

## 2020-01-21 DIAGNOSIS — I10 ESSENTIAL HYPERTENSION: ICD-10-CM

## 2020-01-21 DIAGNOSIS — R06.02 SHORTNESS OF BREATH: ICD-10-CM

## 2020-01-21 DIAGNOSIS — I25.10 CORONARY ARTERY DISEASE INVOLVING NATIVE CORONARY ARTERY OF NATIVE HEART WITHOUT ANGINA PECTORIS: Primary | ICD-10-CM

## 2020-01-21 DIAGNOSIS — I65.29 STENOSIS OF CAROTID ARTERY, UNSPECIFIED LATERALITY: ICD-10-CM

## 2020-01-21 PROCEDURE — 99213 OFFICE O/P EST LOW 20 MIN: CPT | Performed by: PHYSICIAN ASSISTANT

## 2020-01-21 RX ORDER — MONTELUKAST SODIUM 10 MG/1
10 TABLET ORAL NIGHTLY
Qty: 30 TABLET | Refills: 11 | Status: SHIPPED | OUTPATIENT
Start: 2020-01-21 | End: 2021-08-02

## 2020-01-21 RX ORDER — BUDESONIDE 0.5 MG/2ML
0.5 INHALANT ORAL AS NEEDED
COMMUNITY
Start: 2019-12-17

## 2020-01-21 RX ORDER — DOXYCYCLINE HYCLATE 100 MG/1
100 CAPSULE ORAL 2 TIMES DAILY
Qty: 20 CAPSULE | Refills: 0 | Status: SHIPPED | OUTPATIENT
Start: 2020-01-21 | End: 2020-08-25

## 2020-01-21 NOTE — PROGRESS NOTES
Problem list     Subjective   Ira Stinson is a 59 y.o. female     Chief Complaint   Patient presents with   • Coronary Artery Disease   • Hypertension   • Rapid Heart Rate   Problem List:  1. Coronary artery disease   1.1 History of CABG with follow-up stenting.  1.2 Recent catheterization, May 2014 with PTCA for IntraStent restenosis in the free radial to the RCA.  1.3. Repeat catheterization in July 2014 due to new 70-80% stenosis with angioplasty, subsequent dissection and stenting. Recommendations to consider single vessel bypass due to high grade stenosis  1.4 coronary artery bypass grafting with LIMA to LAD, vein graft to right coronary artery, vein graft to circumflex. Follow-up catheterization in 2014 demonstrated an occluded vein graft to RCA with stenting of the right coronary artery.  1.4 stress test September 2016 demonstrated posterolateral ischemia and preserved LV function  1.5 follow-up catheterization in November 2016 demonstrated high-grade disease involving the vein graft to the circumflex as well as the arterial graft to the PDA. There was high-grade disease of the native right coronary artery. Patient underwent stenting of the vein graft to circumflex, PTCA and stenting of the vein graft to the right coronary artery and stenting of the native right coronary artery.  1.6 stress test August 2019 suggest no evidence of ischemia preserved LV function  1.7 continued pain on antianginal therapy  1.8 cardiac catheterization October 2019 demonstrated high-grade RCA disease.  There was subtotal IntraStent restenosis as well as distal disease.  Patient underwent stenting x2.  Vein graft to circumflex was patent as well as LIMA.  Occluded native circumflex.  Medical management recommended  2. Peripheral vascular disease with history of right carotid endarterectomy and follow-up right endarterectomy, March 2011.  2.1 The patient has residual left carotid stenosis, which was felt nonobstructive and not  severe enough for mechanical intervention.  2.2 carotid duplex demonstrated 50 to 70% proximal right internal carotid artery stenosis.  2.3 carotid stenting by DR. Fatmata GRIDER  3. Chronic palpitations with previous monitoring indicating short-lived episodes of SVT and frequent PVCs.  4. Hypertension  5. Dyslipidemia  6. Hypothyroidism  7.  Mild aortic stenosis by echocardiogram 2016  7.1 mild aortic insufficiency by echocardiogram July 2019    HPI    Patient is a 59-year-old female who presents to the office for follow-up.  She recently had a carotid stenting by Dr. Avilez.  She is doing remarkably well.    She does not have chest pain or chest pressure.  She has mild to moderate levels of dyspnea.  She has chronic lung disease and feels it is related.  She does not describe PND orthopnea.    Palpitations are minimal.  No dizziness presyncope or syncope.  She is feeling remarkably well.  Her blood pressure elevated today but describes having to pack an oxygen machine feeling it is related.  She is monitoring it at home.      Current Outpatient Medications on File Prior to Visit   Medication Sig Dispense Refill   • Alirocumab (PRALUENT) 150 MG/ML solution auto-injector Inject 1 applicator under the skin into the appropriate area as directed Every 14 (Fourteen) Days. 2 pen 11   • ALPRAZolam (XANAX) 1 MG tablet Take 1 mg by mouth 2 (Two) Times a Day.  1   • aspirin 81 MG tablet Take 1 tablet by mouth Daily. 30 tablet 11   • budesonide (PULMICORT) 0.5 MG/2ML nebulizer solution U 2 ML VIA NEB BID     • budesonide-formoterol (SYMBICORT) 160-4.5 MCG/ACT inhaler Symbicort 160-4.5 MCG/ACT Inhalation Aerosol; Patient Sig: Symbicort 160-4.5 MCG/ACT Inhalation Aerosol 2 puffs BID     • clopidogrel (PLAVIX) 75 MG tablet Take 75 mg by mouth Daily.     • DULoxetine (CYMBALTA) 60 MG capsule Take 60 mg by mouth Daily.     • Ferrous Sulfate Dried (FERROUS SULFATE CR PO) Take  by mouth.     • folic acid (FOLVITE) 1 MG tablet Take 1 mg  by mouth Daily.     • furosemide (LASIX) 20 MG tablet Take  by mouth As Needed.     • isosorbide dinitrate (ISORDIL) 30 MG tablet TAKE 1 TABLET BY MOUTH IN THE MORNING AND 1 2 (ONE HALF) TABLET IN THE EVENING  2   • lansoprazole (PREVACID) 30 MG capsule Take 30 mg by mouth Daily.     • levothyroxine (SYNTHROID, LEVOTHROID) 25 MCG tablet Take 25 mcg by mouth Daily.     • metFORMIN ER (GLUCOPHAGE-XR) 500 MG 24 hr tablet Take 500 mg by mouth Daily.  2   • metoprolol tartrate (LOPRESSOR) 50 MG tablet Take 50 mg by mouth 2 (Two) Times a Day.     • nicotine (NICODERM CQ) 21 MG/24HR patch Place 1 patch on the skin as directed by provider Daily.     • nitroglycerin (NITROSTAT) 0.4 MG SL tablet Place 0.4 mg under the tongue Every 5 (Five) Minutes As Needed.     • SPIRIVA RESPIMAT 2.5 MCG/ACT aerosol solution inhaler INHALE 2 SPRAY(S) BY MOUTH ONCE DAILY  3   • VENTOLIN  (90 Base) MCG/ACT inhaler As Needed.     • vitamin C (ASCORBIC ACID) 500 MG tablet Take 500 mg by mouth Daily.       No current facility-administered medications on file prior to visit.        Acetaminophen; Codeine; Contrast dye; Hydrocodone bitartrate er; Lortab [hydrocodone-acetaminophen]; Oxycodone hcl; Percocet [oxycodone-acetaminophen]; Propoxyphene; Protonix [pantoprazole sodium]; and Penicillins    Past Medical History:   Diagnosis Date   • Anxiety    • Arthritis    • Autoimmune disorder (CMS/HCC)    • Carotid artery stenosis    • Chest pain    • COPD (chronic obstructive pulmonary disease) (CMS/HCC)    • Coronary artery disease     stents times 6   • Depression    • Diabetes (CMS/HCC)     type 2 dm, check blood sugar 2 to 3 times a week   • GERD (gastroesophageal reflux disease)    • Goiter    • Heart murmur    • Hiatal hernia    • History of transfusion    • Hyperlipidemia    • Hypertension    • Hypothyroidism    • Iron deficiency anemia    • Leaky heart valve    • Lichen planus    • Palpitations     with previous monitoring indicating  "short-lived episodes of SVT and frequent PVCs.   • Peripheral vascular disease (CMS/HCC)     with history of right carotid endarterectomy and follow-up right endarterectomy, March 2011.   • PONV (postoperative nausea and vomiting)     severe   • PVC (premature ventricular contraction)    • PVD (peripheral vascular disease) (CMS/HCC)    • Shortness of breath    • SVT (supraventricular tachycardia) (CMS/HCC)    • Wears dentures     upper   • Wears reading eyeglasses        Social History     Socioeconomic History   • Marital status:      Spouse name: Not on file   • Number of children: 2   • Years of education: Not on file   • Highest education level: Not on file   Occupational History   • Occupation: Nursing     Comment: disabled   Tobacco Use   • Smoking status: Current Every Day Smoker     Packs/day: 0.25     Years: 45.00     Pack years: 11.25     Types: Cigarettes   • Smokeless tobacco: Never Used   Substance and Sexual Activity   • Alcohol use: No   • Drug use: No   • Sexual activity: Defer   Social History Narrative    The patient lives with her daughter and  in [place].       Family History   Problem Relation Age of Onset   • Coronary artery disease Other    • Hyperlipidemia Other    • Hypertension Other    • Cancer Other    • Heart disease Mother    • Heart disease Father        Review of Systems   Respiratory: Positive for cough and shortness of breath. Negative for chest tightness.    Cardiovascular: Negative for chest pain, palpitations and leg swelling.   Neurological: Positive for headaches. Negative for dizziness, weakness and light-headedness.   Psychiatric/Behavioral: Negative for sleep disturbance.   All other systems reviewed and are negative.      Objective   Vitals:    01/21/20 1340   BP: (!) 184/81   BP Location: Left arm   Patient Position: Sitting   Cuff Size: Adult   Pulse: 110   SpO2: 90%   Weight: 80.7 kg (178 lb)   Height: 167.6 cm (65.98\")      BP (!) 184/81 (BP Location: " "Left arm, Patient Position: Sitting, Cuff Size: Adult)   Pulse 110   Ht 167.6 cm (65.98\")   Wt 80.7 kg (178 lb)   SpO2 90%   BMI 28.74 kg/m²     Lab Results (most recent)     None          Physical Exam   Constitutional: She is oriented to person, place, and time. She appears well-developed and well-nourished. No distress.   HENT:   Head: Normocephalic and atraumatic.   Eyes: Pupils are equal, round, and reactive to light. EOM are normal.   Neck: No JVD present. Carotid bruit is present.   Cardiovascular: Normal rate, regular rhythm and normal heart sounds. Exam reveals no gallop and no friction rub.   No murmur heard.  Grade 1/6 to 2/6 systolic ejection murmur right upper sternal border   Pulmonary/Chest: Effort normal and breath sounds normal. No respiratory distress. She has no wheezes. She has no rales. She exhibits no tenderness.   Musculoskeletal: Normal range of motion. She exhibits no edema.   Neurological: She is alert and oriented to person, place, and time. No cranial nerve deficit.   Skin: Skin is warm and dry. No rash noted. No erythema. No pallor.   Psychiatric: She has a normal mood and affect. Her behavior is normal.   Nursing note and vitals reviewed.      Procedure   Procedures       Assessment/Plan     Problems Addressed this Visit        Cardiovascular and Mediastinum    CAD s/p multiple stents; s/p CABG x3 (2000) - Primary    Essential hypertension    Stenosis of carotid artery       Respiratory    Shortness of breath            Recommendation  1.  Patient with coronary artery disease doing remarkably well.  She has no ischemic symptoms we will continue medical therapy.  Patient sent to have laboratories to evaluate lipid parameters P  2.  Patient with carotid artery stenosis status post stenting with no symptoms of cerebral embolic events.  For now she is doing well.  3.  Blood pressure is elevated today.  She is monitoring at home.  For now we will continue current medical therapy.  We " may have to increase antihypertensive regimen.  She will follow with primary as scheduled       Electronically signed by:

## 2020-02-04 NOTE — TELEPHONE ENCOUNTER
Letter received from [facility] advising Repatha is preferred over Praluent. Verbal order per Hardik Victor PA-C for Repatha 420 mg. Patient is aware. She is having labs done by PCP tomorrow and will have faxed to our office. Repatha sent to [facility] in [place]. Dina Astorga MA

## 2020-02-06 ENCOUNTER — PRIOR AUTHORIZATION (OUTPATIENT)
Dept: CARDIOLOGY | Facility: CLINIC | Age: 60
End: 2020-02-06

## 2020-02-13 ENCOUNTER — PRIOR AUTHORIZATION (OUTPATIENT)
Dept: CARDIOLOGY | Facility: CLINIC | Age: 60
End: 2020-02-13

## 2020-02-17 ENCOUNTER — TELEPHONE (OUTPATIENT)
Dept: CARDIOLOGY | Facility: CLINIC | Age: 60
End: 2020-02-17

## 2020-02-17 DIAGNOSIS — I25.10 CORONARY ARTERY DISEASE INVOLVING NATIVE CORONARY ARTERY OF NATIVE HEART WITHOUT ANGINA PECTORIS: Primary | ICD-10-CM

## 2020-02-17 DIAGNOSIS — E78.00 PURE HYPERCHOLESTEROLEMIA: ICD-10-CM

## 2020-02-17 RX ORDER — PRAVASTATIN SODIUM 40 MG
40 TABLET ORAL DAILY
Qty: 30 TABLET | Refills: 5 | Status: SHIPPED | OUTPATIENT
Start: 2020-02-17 | End: 2020-08-18

## 2020-02-17 NOTE — TELEPHONE ENCOUNTER
Spoke with patient.  Confirms she is taking Praluent injections every 2 weeks.  States she has tried Crestor in past but did not work well for her so this medication was changed to Lipitor 80 mg daily with addition of Zetia 10 mg daily.  States she began having muscle cramps and weakness along with fatigue so she was changed to the Praluent injections.  Please advise!

## 2020-02-17 NOTE — TELEPHONE ENCOUNTER
Her cholesterol is still elevated despite being on Praluent.  Statin like pravastatin 40 mg.  Discussed with her that if she starts developing muscle cramps or aching, call our office.  Because of her history of multiple revascularization procedures and stenting, keeping her cholesterol level as low as possible can prevent further events

## 2020-02-17 NOTE — TELEPHONE ENCOUNTER
Notified patient of provider recommendations.  Verbalizes understanding and is agreeable to start Pravastatin 40 mg daily.  No questions at this time.  Medication escribed to [facility] pharmacy per patient request.

## 2020-02-17 NOTE — TELEPHONE ENCOUNTER
----- Message from JANNA Davies sent at 2/17/2020  9:30 AM EST -----  If she is taking her Praluent, can you see if she has taken a statin before like Lipitor or Crestor and if it bothered her

## 2020-08-06 ENCOUNTER — OFFICE VISIT (OUTPATIENT)
Dept: CARDIOLOGY | Facility: CLINIC | Age: 60
End: 2020-08-06

## 2020-08-06 VITALS
HEART RATE: 101 BPM | SYSTOLIC BLOOD PRESSURE: 134 MMHG | BODY MASS INDEX: 28.25 KG/M2 | OXYGEN SATURATION: 91 % | WEIGHT: 175.8 LBS | HEIGHT: 66 IN | DIASTOLIC BLOOD PRESSURE: 71 MMHG

## 2020-08-06 DIAGNOSIS — R07.9 CHEST PAIN, UNSPECIFIED TYPE: ICD-10-CM

## 2020-08-06 DIAGNOSIS — E78.00 PURE HYPERCHOLESTEROLEMIA: Primary | ICD-10-CM

## 2020-08-06 DIAGNOSIS — I25.10 CORONARY ARTERY DISEASE INVOLVING NATIVE CORONARY ARTERY OF NATIVE HEART WITHOUT ANGINA PECTORIS: ICD-10-CM

## 2020-08-06 DIAGNOSIS — R06.02 SHORTNESS OF BREATH: ICD-10-CM

## 2020-08-06 DIAGNOSIS — I65.29 STENOSIS OF CAROTID ARTERY, UNSPECIFIED LATERALITY: ICD-10-CM

## 2020-08-06 PROCEDURE — 99214 OFFICE O/P EST MOD 30 MIN: CPT | Performed by: PHYSICIAN ASSISTANT

## 2020-08-06 RX ORDER — DIPHENHYDRAMINE HCL 25 MG
TABLET ORAL
Qty: 3 TABLET | Refills: 0 | Status: SHIPPED | OUTPATIENT
Start: 2020-08-06 | End: 2020-08-25

## 2020-08-06 RX ORDER — FAMOTIDINE 20 MG/1
TABLET, FILM COATED ORAL
Qty: 3 TABLET | Refills: 0 | Status: SHIPPED | OUTPATIENT
Start: 2020-08-06 | End: 2020-08-25

## 2020-08-06 RX ORDER — RANOLAZINE 1000 MG/1
1000 TABLET, EXTENDED RELEASE ORAL EVERY 12 HOURS SCHEDULED
Qty: 60 TABLET | Refills: 6 | Status: SHIPPED | OUTPATIENT
Start: 2020-08-06 | End: 2021-03-16 | Stop reason: SDUPTHER

## 2020-08-06 RX ORDER — PREDNISONE 20 MG/1
TABLET ORAL
Qty: 3 TABLET | Refills: 0 | Status: SHIPPED | OUTPATIENT
Start: 2020-08-06 | End: 2020-08-25

## 2020-08-06 RX ORDER — ISOSORBIDE MONONITRATE 60 MG/1
60 TABLET, EXTENDED RELEASE ORAL EVERY MORNING
Qty: 30 TABLET | Refills: 11 | Status: SHIPPED | OUTPATIENT
Start: 2020-08-06 | End: 2021-07-12

## 2020-08-06 NOTE — PROGRESS NOTES
Problem list     Subjective   Ira Stinson is a 60 y.o. female     Chief Complaint   Patient presents with   • Follow-up   Problem List:  1. Coronary artery disease   1.1 History of CABG with follow-up stenting.  1.2 Recent catheterization, May 2014 with PTCA for IntraStent restenosis in the free radial to the RCA.  1.3. Repeat catheterization in July 2014 due to new 70-80% stenosis with angioplasty, subsequent dissection and stenting. Recommendations to consider single vessel bypass due to high grade stenosis  1.4 coronary artery bypass grafting with LIMA to LAD, vein graft to right coronary artery, vein graft to circumflex. Follow-up catheterization in 2014 demonstrated an occluded vein graft to RCA with stenting of the right coronary artery.  1.4 stress test September 2016 demonstrated posterolateral ischemia and preserved LV function  1.5 follow-up catheterization in November 2016 demonstrated high-grade disease involving the vein graft to the circumflex as well as the arterial graft to the PDA. There was high-grade disease of the native right coronary artery. Patient underwent stenting of the vein graft to circumflex, PTCA and stenting of the vein graft to the right coronary artery and stenting of the native right coronary artery.  1.6 stress test August 2019 suggest no evidence of ischemia preserved LV function  1.7 continued pain on antianginal therapy  1.8 cardiac catheterization October 2019 demonstrated high-grade RCA disease.  There was subtotal IntraStent restenosis as well as distal disease.  Patient underwent stenting x2.  Vein graft to circumflex was patent as well as LIMA.  Occluded native circumflex.  Medical management recommended  2. Peripheral vascular disease with history of right carotid endarterectomy and follow-up right endarterectomy, March 2011.  2.1 The patient has residual left carotid stenosis, which was felt nonobstructive and not severe enough for mechanical intervention.  2.2 carotid  duplex demonstrated 50 to 70% proximal right internal carotid artery stenosis.  2.3 carotid stenting by DR. Fatmata GRIDER  3. Chronic palpitations with previous monitoring indicating short-lived episodes of SVT and frequent PVCs.  4. Hypertension  5. Dyslipidemia  6. Hypothyroidism  7.  Mild aortic stenosis by echocardiogram 2016  7.1 mild aortic insufficiency by echocardiogram July 2019    HPI    Patient is a 60-year-old female that presents to the office for follow-up and for evaluation.    Patient is followed routinely in the clinic in the setting of vasculopathy.  Patient has significant disease involving the carotid arteries and coronary arteries.  Patient has been treated properly for coronary disease.  She is currently on Praluent as well as statin therapy to help manage her lipids.    She complains of chest pain.  Patient is quite cognizant of her angina.  She is very aware of the type of discomfort associated with angina and describes having significant angina since her visit here.  She feels pressure substernally and this occurs at rest.  She experiences this despite being on antianginal therapy and feels as if she has high-grade disease based on the nature of her intense resting pain.    She is short of breath.  She has chronic dyspnea and likely has a degree of lung disease.  Her dyspnea is mild to moderate but has been noticeable recently.  She does not describe PND orthopnea.    She does describe palpitations on occasion.  She does not describe symptoms of cerebral embolic events.  It is mild and manageable.  Otherwise is doing well      Current Outpatient Medications on File Prior to Visit   Medication Sig Dispense Refill   • ALPRAZolam (XANAX) 1 MG tablet Take 1 mg by mouth 2 (Two) Times a Day.  1   • budesonide (PULMICORT) 0.5 MG/2ML nebulizer solution U 2 ML VIA NEB BID     • budesonide-formoterol (SYMBICORT) 160-4.5 MCG/ACT inhaler Symbicort 160-4.5 MCG/ACT Inhalation Aerosol; Patient Sig: Symbicort  160-4.5 MCG/ACT Inhalation Aerosol 2 puffs BID     • clopidogrel (PLAVIX) 75 MG tablet Take 75 mg by mouth Daily.     • DULoxetine (CYMBALTA) 60 MG capsule Take 60 mg by mouth Daily.     • Evolocumab with Infusor 420 MG/3.5ML solution cartridge Inject 1 Cartridge under the skin into the appropriate area as directed Every 30 (Thirty) Days. 1 cartridge. 11   • folic acid (FOLVITE) 1 MG tablet Take 1 mg by mouth Daily.     • furosemide (LASIX) 20 MG tablet Take  by mouth As Needed.     • lansoprazole (PREVACID) 30 MG capsule Take 30 mg by mouth Daily.     • levothyroxine (SYNTHROID, LEVOTHROID) 25 MCG tablet Take 25 mcg by mouth Daily.     • metFORMIN ER (GLUCOPHAGE-XR) 500 MG 24 hr tablet Take 500 mg by mouth Daily.  2   • metoprolol tartrate (LOPRESSOR) 50 MG tablet Take 50 mg by mouth 2 (Two) Times a Day.     • nitroglycerin (NITROSTAT) 0.4 MG SL tablet Place 0.4 mg under the tongue Every 5 (Five) Minutes As Needed.     • pravastatin (PRAVACHOL) 40 MG tablet Take 1 tablet by mouth Daily. 30 tablet 5   • SPIRIVA RESPIMAT 2.5 MCG/ACT aerosol solution inhaler INHALE 2 SPRAY(S) BY MOUTH ONCE DAILY  3   • VENTOLIN  (90 Base) MCG/ACT inhaler As Needed.     • vitamin C (ASCORBIC ACID) 500 MG tablet Take 500 mg by mouth Daily.     • VITAMIN D PO Take  by mouth.     • Zinc 50 MG capsule Take  by mouth.     • aspirin 81 MG tablet Take 1 tablet by mouth Daily. 30 tablet 11   • doxycycline (VIBRAMYCIN) 100 MG capsule Take 1 capsule by mouth 2 (Two) Times a Day. 20 capsule 0   • Ferrous Sulfate Dried (FERROUS SULFATE CR PO) Take  by mouth.     • montelukast (SINGULAIR) 10 MG tablet Take 1 tablet by mouth Every Night. 30 tablet 11   • nicotine (NICODERM CQ) 21 MG/24HR patch Place 1 patch on the skin as directed by provider Daily.       No current facility-administered medications on file prior to visit.        Oxycodone hcl; Acetaminophen; Atorvastatin; Codeine; Hydrocodone bitartrate er; Lortab  [hydrocodone-acetaminophen]; Percocet [oxycodone-acetaminophen]; Propoxyphene; Protonix [pantoprazole sodium]; Contrast dye; and Penicillins    Past Medical History:   Diagnosis Date   • Anxiety    • Arthritis    • Autoimmune disorder (CMS/[facility])    • Carotid artery stenosis    • Chest pain    • COPD (chronic obstructive pulmonary disease) (CMS/[facility])    • Coronary artery disease     stents times 6   • Depression    • Diabetes (CMS/[facility])     type 2 dm, check blood sugar 2 to 3 times a week   • GERD (gastroesophageal reflux disease)    • Goiter    • Heart murmur    • Hiatal hernia    • History of transfusion    • Hyperlipidemia    • Hypertension    • Hypothyroidism    • Iron deficiency anemia    • Leaky heart valve    • Lichen planus    • Palpitations     with previous monitoring indicating short-lived episodes of SVT and frequent PVCs.   • Peripheral vascular disease (CMS/[facility])     with history of right carotid endarterectomy and follow-up right endarterectomy, March 2011.   • PONV (postoperative nausea and vomiting)     severe   • PVC (premature ventricular contraction)    • PVD (peripheral vascular disease) (CMS/[facility])    • Shortness of breath    • SVT (supraventricular tachycardia) (CMS/[facility])    • Wears dentures     upper   • Wears reading eyeglasses        Social History     Socioeconomic History   • Marital status:      Spouse name: Not on file   • Number of children: 2   • Years of education: Not on file   • Highest education level: Not on file   Occupational History   • Occupation: Nursing     Comment: disabled   Tobacco Use   • Smoking status: Current Every Day Smoker     Packs/day: 0.25     Years: 45.00     Pack years: 11.25     Types: Cigarettes   • Smokeless tobacco: Never Used   Substance and Sexual Activity   • Alcohol use: No   • Drug use: No   • Sexual activity: Defer   Social History Narrative    The patient lives with her daughter and  in [place].       Family History   Problem Relation Age of  "Onset   • Coronary artery disease Other    • Hyperlipidemia Other    • Hypertension Other    • Cancer Other    • Heart disease Mother    • Heart disease Father        Review of Systems   Constitutional: Positive for fatigue.   HENT: Negative for congestion, rhinorrhea and sore throat.    Eyes: Negative for visual disturbance.   Respiratory: Positive for cough (dry \"hackey\" cough) and shortness of breath (02 2L ). Negative for chest tightness.    Cardiovascular: Positive for chest pain (occasional sharp CP) and palpitations (\"all the time\" ). Negative for leg swelling.        Pt c/o edema in her face   Gastrointestinal: Positive for constipation. Negative for abdominal pain, blood in stool, diarrhea, nausea and vomiting.   Genitourinary: Positive for difficulty urinating (States she feels a pressure when she urinates, like she needs to urinate more, but no more comes out/. Pt stated she does not want any other specialists or medications. ). Negative for dysuria, frequency, hematuria and urgency.   Musculoskeletal: Positive for arthralgias, back pain and neck pain.   Skin: Negative for rash and wound.   Neurological: Positive for dizziness (If change spositions too quickly ), weakness (legs ) and headaches (Thinks from medications ). Negative for syncope, light-headedness and numbness.   Hematological: Bruises/bleeds easily (both).   Psychiatric/Behavioral: Negative for sleep disturbance.       Objective   Vitals:    08/06/20 1517   BP: 134/71   Pulse: 101   SpO2: 91%   Weight: 79.7 kg (175 lb 12.8 oz)   Height: 167.6 cm (66\")      /71   Pulse 101   Ht 167.6 cm (66\")   Wt 79.7 kg (175 lb 12.8 oz)   SpO2 91% Comment: 2L 02  BMI 28.37 kg/m²     Lab Results (most recent)     None          Physical Exam   Constitutional: She is oriented to person, place, and time. She appears well-developed and well-nourished. No distress.   HENT:   Head: Normocephalic and atraumatic.   Eyes: Conjunctivae are normal. Right eye " exhibits no discharge. Left eye exhibits no discharge. No scleral icterus.   Neck: No JVD present. Carotid bruit is present.   Cardiovascular: Normal rate, regular rhythm and normal heart sounds. Exam reveals no gallop and no friction rub.   No murmur heard.  Pulmonary/Chest: Effort normal and breath sounds normal. No respiratory distress. She has no wheezes. She has no rales. She exhibits no tenderness.   Musculoskeletal: Normal range of motion. She exhibits no edema.   Neurological: She is alert and oriented to person, place, and time. No cranial nerve deficit.   Skin: Skin is warm and dry. No rash noted. No erythema. No pallor.   Psychiatric: She has a normal mood and affect. Her behavior is normal.   Nursing note and vitals reviewed.      Procedure   Procedures       Assessment/Plan     Problems Addressed this Visit        Cardiovascular and Mediastinum    CAD s/p multiple stents; s/p CABG x3 (2000)    Relevant Medications    isosorbide mononitrate (IMDUR) 60 MG 24 hr tablet    ranolazine (RANEXA) 1000 MG 12 hr tablet    Other Relevant Orders    [facility]    CBC & Differential    Comprehensive Metabolic Panel    Stenosis of carotid artery    Relevant Orders    [facility]    CBC & Differential    Comprehensive Metabolic Panel    Pure hypercholesterolemia - Primary    Relevant Orders    [facility]    CBC & Differential    Comprehensive Metabolic Panel       Respiratory    Shortness of breath    Relevant Orders    [facility]    CBC & Differential    Comprehensive Metabolic Panel       Nervous and Auditory    Chest pain    Relevant Orders    [facility]    CBC & Differential    Comprehensive Metabolic Panel            Recommendation  1.  Patient is a 60-year-old vasculopath with recurrent disease and stenosis requiring multiple interventions in the coronaries and has had carotid evaluation.  We have become aggressive in risk factor modification she currently is on  Praluent as well as statin therapy to help manage lipids.  For now, she is having significant resting angina that is failing to be treated with medical therapy and continues despite nitrate therapy.  Because of her significant profound symptoms with history of multiple interventions and patient being quite cognizant of her angina, we are scheduled for catheterization due to the unstable nature of symptoms    2.  She is on appropriate medical therapy will continue.  We will continue dual antiplatelet therapy.  We will see her back for follow-up after catheterization.  She will follow with primary as scheduled         Ira Stinson  reports that she has been smoking cigarettes. She has a 11.25 pack-year smoking history. She has never used smokeless tobacco.. I have educated her on the risk of diseases from using tobacco products such as cancer, COPD and heart diease.     I advised her to quit and she is not willing to quit.    Patient's Body mass index is 28.37 kg/m². BMI is above normal parameters. Recommendations include: educational material.       Electronically signed by:

## 2020-08-06 NOTE — PATIENT INSTRUCTIONS
Steps to Quit Smoking  Smoking tobacco is the leading cause of preventable death. It can affect almost every organ in the body. Smoking puts you and people around you at risk for many serious, long-lasting (chronic) diseases. Quitting smoking can be hard, but it is one of the best things that you can do for your health. It is never too late to quit.  How do I get ready to quit?  When you decide to quit smoking, make a plan to help you succeed. Before you quit:  · Pick a date to quit. Set a date within the next 2 weeks to give you time to prepare.  · Write down the reasons why you are quitting. Keep this list in places where you will see it often.  · Tell your family, friends, and co-workers that you are quitting. Their support is important.  · Talk with your doctor about the choices that may help you quit.  · Find out if your health insurance will pay for these treatments.  · Know the people, places, things, and activities that make you want to smoke (triggers). Avoid them.  What first steps can I take to quit smoking?  · Throw away all cigarettes at home, at work, and in your car.  · Throw away the things that you use when you smoke, such as ashtrays and lighters.  · Clean your car. Make sure to empty the ashtray.  · Clean your home, including curtains and carpets.  What can I do to help me quit smoking?  Talk with your doctor about taking medicines and seeing a counselor at the same time. You are more likely to succeed when you do both.  · If you are pregnant or breastfeeding, talk with your doctor about counseling or other ways to quit smoking. Do not take medicine to help you quit smoking unless your doctor tells you to do so.  To quit smoking:  Quit right away  · Quit smoking totally, instead of slowly cutting back on how much you smoke over a period of time.  · Go to counseling. You are more likely to quit if you go to counseling sessions regularly.  Take medicine  You may take medicines to help you quit. Some  medicines need a prescription, and some you can buy over-the-counter. Some medicines may contain a drug called nicotine to replace the nicotine in cigarettes. Medicines may:  · Help you to stop having the desire to smoke (cravings).  · Help to stop the problems that come when you stop smoking (withdrawal symptoms).  Your doctor may ask you to use:  · Nicotine patches, gum, or lozenges.  · Nicotine inhalers or sprays.  · Non-nicotine medicine that is taken by mouth.  Find resources  Find resources and other ways to help you quit smoking and remain smoke-free after you quit. These resources are most helpful when you use them often. They include:  · Online chats with a counselor.  · Phone quitlines.  · Printed self-help materials.  · Support groups or group counseling.  · Text messaging programs.  · Mobile phone apps. Use apps on your mobile phone or tablet that can help you stick to your quit plan. There are many free apps for mobile phones and tablets as well as websites. Examples include Quit Guide from the CDC and smokefree.gov    What things can I do to make it easier to quit?    · Talk to your family and friends. Ask them to support and encourage you.  · Call a phone quitline (1-783-QUITNOW), reach out to support groups, or work with a counselor.  · Ask people who smoke to not smoke around you.  · Avoid places that make you want to smoke, such as:  ? Bars.  ? Parties.  ? Smoke-break areas at work.  · Spend time with people who do not smoke.  · Lower the stress in your life. Stress can make you want to smoke. Try these things to help your stress:  ? Getting regular exercise.  ? Doing deep-breathing exercises.  ? Doing yoga.  ? Meditating.  ? Doing a body scan. To do this, close your eyes, focus on one area of your body at a time from head to toe. Notice which parts of your body are tense. Try to relax the muscles in those areas.  How will I feel when I quit smoking?  Day 1 to 3 weeks  Within the first 24 hours,  you may start to have some problems that come from quitting tobacco. These problems are very bad 2-3 days after you quit, but they do not often last for more than 2-3 weeks. You may get these symptoms:  · Mood swings.  · Feeling restless, nervous, angry, or annoyed.  · Trouble concentrating.  · Dizziness.  · Strong desire for high-sugar foods and nicotine.  · Weight gain.  · Trouble pooping (constipation).  · Feeling like you may vomit (nausea).  · Coughing or a sore throat.  · Changes in how the medicines that you take for other issues work in your body.  · Depression.  · Trouble sleeping (insomnia).  Week 3 and afterward  After the first 2-3 weeks of quitting, you may start to notice more positive results, such as:  · Better sense of smell and taste.  · Less coughing and sore throat.  · Slower heart rate.  · Lower blood pressure.  · Clearer skin.  · Better breathing.  · Fewer sick days.  Quitting smoking can be hard. Do not give up if you fail the first time. Some people need to try a few times before they succeed. Do your best to stick to your quit plan, and talk with your doctor if you have any questions or concerns.  Summary  · Smoking tobacco is the leading cause of preventable death. Quitting smoking can be hard, but it is one of the best things that you can do for your health.  · When you decide to quit smoking, make a plan to help you succeed.  · Quit smoking right away, not slowly over a period of time.  · When you start quitting, seek help from your doctor, family, or friends.  This information is not intended to replace advice given to you by your health care provider. Make sure you discuss any questions you have with your health care provider.  Document Released: 10/14/2010 Document Revised: 03/06/2020 Document Reviewed: 03/07/2020  [employer] Patient Education © 2020 [employer]  How to Quarantine at Home  Information for Patients and Families    These instructions are for people with confirmed or  suspected COVID-19 who do not need to be hospitalized and those with confirmed COVID-19 who were hospitalized and discharged to care for themselves at home.    If you were tested through the Health Department  The Health Department will monitor your wellbeing.  If it is determined that you do not need to be hospitalized and can be isolated at home, you will be monitored by staff from your local or state health department.     If you were tested through a [facility]  You will need to monitor yourself and report changes in your symptoms to your doctor.  See the section below called Monitor Your Symptoms.    Follow these steps until a healthcare provider or local or state health department says you can return to your normal activities.    Stay home except to get medical care  • Restrict activities outside your home, except for getting medical care.   • Do not go to work, school, or public areas.   • Avoid using public transportation, ride-sharing, or taxis.    Separate yourself from other people and animals in your home  People  As much as possible, you should stay in a specific room and away from other people in your home. Also, you should use a separate bathroom, if available.    Animals  You should restrict contact with pets and other animals while you are sick with COVID-19, just like you would around other people. When possible, have another member of your household care for your animals while you are sick. If you are sick with COVID-19, avoid contact with your pet, including petting, snuggling, being kissed or licked, and sharing food. If you must care for your pet or be around animals while you are sick, wash your hands before and after you interact with pets and wear a facemask. See COVID-19 and Animals for more information.    Call ahead before visiting your doctor  If you have a medical appointment, call the healthcare provider and tell them that you have or may have COVID-19. This information will help  the healthcare provider’s office take steps to keep other people from getting infected or exposed.    Wear a facemask  You should wear a facemask when you are around other people (e.g., sharing a room or vehicle) or pets and before you enter a healthcare provider’s office.     If you are not able to wear a facemask (for example, because it causes trouble breathing), then people who live with you should not stay in the same room with you, or they should wear a facemask if they enter your room.    Cover your coughs and sneezes  • Cover your mouth and nose with a tissue when you cough or sneeze.   • Throw used tissues in a lined trash can.   • Immediately wash your hands with soap and water for at least 20 seconds or, if soap and water are not available, clean your hands with an alcohol-based hand  that contains at least 60% alcohol.    Clean your hands often  • Wash your hands often with soap and water for at least 20 seconds, especially after blowing your nose, coughing, or sneezing; going to the bathroom; and before eating or preparing food.     • If soap and water are not readily available, use an alcohol-based hand  with at least 60% alcohol, covering all surfaces of your hands and rubbing them together until they feel dry.    • Soap and water are the best option if hands are visibly dirty. Avoid touching your eyes, nose, and mouth with unwashed hands.    Avoid sharing personal household items  • You should not share dishes, drinking glasses, cups, eating utensils, towels, or bedding with other people or pets in your home.   • After using these items, they should be washed thoroughly with soap and water.    Clean all “high-touch” surfaces everyday  • High touch surfaces include counters, tabletops, doorknobs, bathroom fixtures, toilets, phones, keyboards, tablets, and bedside tables.   • Also, clean any surfaces that may have blood, stool, or body fluids on them.   • Use a household cleaning  spray or wipe, according to the label instructions. Labels contain instructions for safe and effective use of the cleaning product, including precautions you should take when applying the product, such as wearing gloves and making sure you have good ventilation during use of the product.    Monitor your symptoms  • Seek prompt medical attention if your illness is worsening (e.g., difficulty breathing).   • Before seeking care, call your healthcare provider and tell them that you have, or are being evaluated for, COVID-19.   • Put on a facemask before you enter the facility.     • These steps will help the healthcare provider’s office to keep other people in the office or waiting room from getting infected or exposed.   • Persons who are placed under active monitoring or facilitated self-monitoring should follow instructions provided by their local health department or occupational health professionals, as appropriate.  • If you have a medical emergency and need to call 911, notify the dispatch personnel that you have, or are being evaluated for COVID-19. If possible, put on a facemask before emergency medical services arrive.    Discontinuing home isolation  Patients with confirmed COVID-19 should remain under home isolation precautions until the risk of secondary transmission to others is thought to be low. The decision to discontinue home isolation precautions should be made on a case-by-case basis, in consultation with healthcare providers and state and local health departments.    The below content are for household members, intimate partners, and caregivers of a patient with symptomatic laboratory-confirmed COVID-19 or a patient under investigation:    Household members, intimate partners, and caregivers may have close contact with a person with symptomatic, laboratory-confirmed COVID-19 or a person under investigation.     Close contacts should monitor their health; they should call their healthcare provider  right away if they develop symptoms suggestive of COVID-19 (e.g., fever, cough, shortness of breath)     Close contacts should also follow these recommendations:  • Make sure that you understand and can help the patient follow their healthcare provider’s instructions for medication(s) and care. You should help the patient with basic needs in the home and provide support for getting groceries, prescriptions, and other personal needs.  • Monitor the patient’s symptoms. If the patient is getting sicker, call his or her healthcare provider and tell them that the patient has laboratory-confirmed COVID-19. This will help the healthcare provider’s office take steps to keep other people in the office or waiting room from getting infected. Ask the healthcare provider to call the local or state health department for additional guidance. If the patient has a medical emergency and you need to call 911, notify the dispatch personnel that the patient has, or is being evaluated for COVID-19.  • Household members should stay in another room or be  from the patient as much as possible. Household members should use a separate bedroom and bathroom, if available.  • Prohibit visitors who do not have an essential need to be in the home.  • Household members should care for any pets in the home. Do not handle pets or other animals while sick.  For more information, see COVID-19 and Animals.  • Make sure that shared spaces in the home have good air flow, such as by an air conditioner or an opened window, weather permitting.  • Perform hand hygiene frequently. Wash your hands often with soap and water for at least 20 seconds or use an alcohol-based hand  that contains 60 to 95% alcohol, covering all surfaces of your hands and rubbing them together until they feel dry. Soap and water should be used preferentially if hands are visibly dirty.  • Avoid touching your eyes, nose, and mouth with unwashed hands.  • The patient  should wear a facemask when you are around other people. If the patient is not able to wear a facemask (for example, because it causes trouble breathing), you, as the caregiver, should wear a mask when you are in the same room as the patient.  • Wear a disposable facemask and gloves when you touch or have contact with the patient’s blood, stool, or body fluids, such as saliva, sputum, nasal mucus, vomit, or urine.   o Throw out disposable facemasks and gloves after using them. Do not reuse.  o When removing personal protective equipment, first remove and dispose of gloves. Then, immediately clean your hands with soap and water or alcohol-based hand . Next, remove and dispose of facemask, and immediately clean your hands again with soap and water or alcohol-based hand .  • Avoid sharing household items with the patient. You should not share dishes, drinking glasses, cups, eating utensils, towels, bedding, or other items. After the patient uses these items, you should wash them thoroughly (see below “Wash laundry thoroughly”).  • Clean all “high-touch” surfaces, such as counters, tabletops, doorknobs, bathroom fixtures, toilets, phones, keyboards, tablets, and bedside tables, every day. Also, clean any surfaces that may have blood, stool, or body fluids on them.   o Use a household cleaning spray or wipe, according to the label instructions. Labels contain instructions for safe and effective use of the cleaning product including precautions you should take when applying the product, such as wearing gloves and making sure you have good ventilation during use of the product.  • Wash laundry thoroughly.   o Immediately remove and wash clothes or bedding that have blood, stool, or body fluids on them.  o Wear disposable gloves while handling soiled items and keep soiled items away from your body. Clean your hands (with soap and water or an alcohol-based hand ) immediately after removing your  gloves.  o Read and follow directions on labels of laundry or clothing items and detergent. In general, using a normal laundry detergent according to washing machine instructions and dry thoroughly using the warmest temperatures recommended on the clothing label.  • Place all used disposable gloves, facemasks, and other contaminated items in a lined container before disposing of them with other household waste. Clean your hands (with soap and water or an alcohol-based hand ) immediately after handling these items. Soap and water should be used preferentially if hands are visibly dirty.  • Discuss any additional questions with your state or local health department or healthcare provider.    Adapted from information provided by the Centers for Disease Control and Prevention.  For more information, visit https://www.cdc.gov/coronavirus/2019-ncov/hcp/guidance-prevent-spread.htmlFat and Cholesterol Restricted Eating Plan  Getting too much fat and cholesterol in your diet may cause health problems. Choosing the right foods helps keep your fat and cholesterol at normal levels. This can keep you from getting certain diseases.  Your doctor may recommend an eating plan that includes:  · Total fat: ______% or less of total calories a day.  · Saturated fat: ______% or less of total calories a day.  · Cholesterol: less than _________mg a day.  · Fiber: ______g a day.  What are tips for following this plan?  Meal planning  · At meals, divide your plate into four equal parts:  ? Fill one-half of your plate with vegetables and green salads.  ? Fill one-fourth of your plate with whole grains.  ? Fill one-fourth of your plate with low-fat (lean) protein foods.  · Eat fish that is high in omega-3 fats at least two times a week. This includes mackerel, tuna, sardines, and salmon.  · Eat foods that are high in fiber, such as whole grains, beans, apples, broccoli, carrots, peas, and barley.  General tips    · Work with your  "doctor to lose weight if you need to.  · Avoid:  ? Foods with added sugar.  ? Fried foods.  ? Foods with partially hydrogenated oils.  · Limit alcohol intake to no more than 1 drink a day for nonpregnant women and 2 drinks a day for men. One drink equals 12 oz of beer, 5 oz of wine, or 1½ oz of hard liquor.  Reading food labels  · Check food labels for:  ? Trans fats.  ? Partially hydrogenated oils.  ? Saturated fat (g) in each serving.  ? Cholesterol (mg) in each serving.  ? Fiber (g) in each serving.  · Choose foods with healthy fats, such as:  ? Monounsaturated fats.  ? Polyunsaturated fats.  ? Omega-3 fats.  · Choose grain products that have whole grains. Look for the word \"whole\" as the first word in the ingredient list.  Cooking  · Cook foods using low-fat methods. These include baking, boiling, grilling, and broiling.  · Eat more home-cooked foods. Eat at restaurants and buffets less often.  · Avoid cooking using saturated fats, such as butter, cream, palm oil, palm kernel oil, and coconut oil.  Recommended foods    Fruits  · All fresh, canned (in natural juice), or frozen fruits.  Vegetables  · Fresh or frozen vegetables (raw, steamed, roasted, or grilled). Green salads.  Grains  · Whole grains, such as whole wheat or whole grain breads, crackers, cereals, and pasta. Unsweetened oatmeal, bulgur, barley, quinoa, or brown rice. Corn or whole wheat flour tortillas.  Meats and other protein foods  · Ground beef (85% or leaner), grass-fed beef, or beef trimmed of fat. Skinless chicken or turkey. Ground chicken or turkey. Pork trimmed of fat. All fish and seafood. Egg whites. Dried beans, peas, or lentils. Unsalted nuts or seeds. Unsalted canned beans. Nut butters without added sugar or oil.  Dairy  · Low-fat or nonfat dairy products, such as skim or 1% milk, 2% or reduced-fat cheeses, low-fat and fat-free ricotta or cottage cheese, or plain low-fat and nonfat yogurt.  Fats and oils  · Tub margarine without trans " fats. Light or reduced-fat mayonnaise and salad dressings. Avocado. Olive, canola, sesame, or safflower oils.  The items listed above may not be a complete list of foods and beverages you can eat. Contact a dietitian for more information.  Foods to avoid  Fruits  · Canned fruit in heavy syrup. Fruit in cream or butter sauce. Fried fruit.  Vegetables  · Vegetables cooked in cheese, cream, or butter sauce. Fried vegetables.  Grains  · White bread. White pasta. White rice. Cornbread. Bagels, pastries, and croissants. Crackers and snack foods that contain trans fat and hydrogenated oils.  Meats and other protein foods  · Fatty cuts of meat. Ribs, chicken wings, clifford, sausage, bologna, salami, chitterlings, fatback, hot dogs, bratwurst, and packaged lunch meats. Liver and organ meats. Whole eggs and egg yolks. Chicken and turkey with skin. Fried meat.  Dairy  · Whole or 2% milk, cream, half-and-half, and cream cheese. Whole milk cheeses. Whole-fat or sweetened yogurt. Full-fat cheeses. Nondairy creamers and whipped toppings. Processed cheese, cheese spreads, and cheese curds.  Beverages  · Alcohol. Sugar-sweetened drinks such as sodas, lemonade, and fruit drinks.  Fats and oils  · Butter, stick margarine, lard, shortening, ghee, or clifford fat. Coconut, palm kernel, and palm oils.  Sweets and desserts  · Corn syrup, sugars, honey, and molasses. Candy. Jam and jelly. Syrup. Sweetened cereals. Cookies, pies, cakes, donuts, muffins, and ice cream.  The items listed above may not be a complete list of foods and beverages you should avoid. Contact a dietitian for more information.  Summary  · Choosing the right foods helps keep your fat and cholesterol at normal levels. This can keep you from getting certain diseases.  · At meals, fill one-half of your plate with vegetables and green salads.  · Eat high-fiber foods, like whole grains, beans, apples, carrots, peas, and barley.  · Limit added sugar, saturated fats, alcohol, and  "fried foods.  This information is not intended to replace advice given to you by your health care provider. Make sure you discuss any questions you have with your health care provider.  Document Released: 06/18/2013 Document Revised: 08/21/2019 Document Reviewed: 09/04/2018  [employer] Patient Education © 2020 [employer]  BMI for Adults    Body mass index (BMI) is a number that is calculated from a person's weight and height. BMI may help to estimate how much of a person's weight is composed of fat. BMI can help identify those who may be at higher risk for certain medical problems.  How is BMI used with adults?  BMI is used as a screening tool to identify possible weight problems. It is used to check whether a person is obese, overweight, healthy weight, or underweight.  How is BMI calculated?  BMI measures your weight and compares it to your height. This can be done either in English ([place] or metric measurements. Note that charts are available to help you find your BMI quickly and easily without having to do these calculations yourself.  To calculate your BMI in English ([place] measurements, your health care provider will:  1. Measure your weight in pounds (lb).  2. Multiply the number of pounds by 703.  ? For example, for a person who weighs 180 lb, multiply that number by 703, which equals 126,540.  3. Measure your height in inches (in). Then multiply that number by itself to get a measurement called \"inches squared.\"  ? For example, for a person who is 70 in tall, the \"inches squared\" measurement is 70 in x 70 in, which equals 4900 inches squared.  4. Divide the total from Step 2 (number of lb x 703) by the total from Step 3 (inches squared): 126,540 ÷ 4900 = 25.8. This is your BMI.  To calculate your BMI in metric measurements, your health care provider will:  1. Measure your weight in kilograms (kg).  2. Measure your height in meters (m). Then multiply that number by itself to get a measurement called \"meters " "squared.\"  ? For example, for a person who is 1.75 m tall, the \"meters squared\" measurement is 1.75 m x 1.75 m, which is equal to 3.1 meters squared.  3. Divide the number of kilograms (your weight) by the meters squared number. In this example: 70 ÷ 3.1 = 22.6. This is your BMI.  How is BMI interpreted?  To interpret your results, your health care provider will use BMI charts to identify whether you are underweight, normal weight, overweight, or obese. The following guidelines will be used:  · Underweight: BMI less than 18.5.  · Normal weight: BMI between 18.5 and 24.9.  · Overweight: BMI between 25 and 29.9.  · Obese: BMI of 30 and above.  Please note:  · Weight includes both fat and muscle, so someone with a muscular build, such as an athlete, may have a BMI that is higher than 24.9. In cases like these, BMI is not an accurate measure of body fat.  · To determine if excess body fat is the cause of a BMI of 25 or higher, further assessments may need to be done by a health care provider.  · BMI is usually interpreted in the same way for men and women.  Why is BMI a useful tool?  BMI is useful in two ways:  · Identifying a weight problem that may be related to a medical condition, or that may increase the risk for medical problems.  · Promoting lifestyle and diet changes in order to reach a healthy weight.  Summary  · Body mass index (BMI) is a number that is calculated from a person's weight and height.  · BMI may help to estimate how much of a person's weight is composed of fat. BMI can help identify those who may be at higher risk for certain medical problems.  · BMI can be measured using English measurements or metric measurements.  · To interpret your results, your health care provider will use BMI charts to identify whether you are underweight, normal weight, overweight, or obese.  This information is not intended to replace advice given to you by your health care provider. Make sure you discuss any questions " you have with your health care provider.  Document Released: 08/29/2005 Document Revised: 11/30/2018 Document Reviewed: 10/31/2018  Elsevier Patient Education © 2020 [employer]

## 2020-08-12 ENCOUNTER — LAB (OUTPATIENT)
Dept: LAB | Facility: HOSPITAL | Age: 60
End: 2020-08-12

## 2020-08-12 DIAGNOSIS — I65.29 STENOSIS OF CAROTID ARTERY, UNSPECIFIED LATERALITY: ICD-10-CM

## 2020-08-12 DIAGNOSIS — R06.02 SHORTNESS OF BREATH: ICD-10-CM

## 2020-08-12 DIAGNOSIS — R07.9 CHEST PAIN, UNSPECIFIED TYPE: ICD-10-CM

## 2020-08-12 DIAGNOSIS — I25.10 CORONARY ARTERY DISEASE INVOLVING NATIVE CORONARY ARTERY OF NATIVE HEART WITHOUT ANGINA PECTORIS: ICD-10-CM

## 2020-08-12 DIAGNOSIS — E78.00 PURE HYPERCHOLESTEROLEMIA: ICD-10-CM

## 2020-08-12 LAB
ALBUMIN SERPL-MCNC: 4.56 G/DL (ref 3.5–5.2)
ALBUMIN/GLOB SERPL: 1.5 G/DL
ALP SERPL-CCNC: 89 U/L (ref 39–117)
ALT SERPL W P-5'-P-CCNC: 15 U/L (ref 1–33)
ANION GAP SERPL CALCULATED.3IONS-SCNC: 18.5 MMOL/L (ref 5–15)
AST SERPL-CCNC: 19 U/L (ref 1–32)
BASOPHILS # BLD AUTO: 0.09 10*3/MM3 (ref 0–0.2)
BASOPHILS NFR BLD AUTO: 1.1 % (ref 0–1.5)
BILIRUB SERPL-MCNC: 0.3 MG/DL (ref 0–1.2)
BUN SERPL-MCNC: 11 MG/DL (ref 8–23)
BUN/CREAT SERPL: 17.5 (ref 7–25)
CALCIUM SPEC-SCNC: 9.9 MG/DL (ref 8.6–10.5)
CHLORIDE SERPL-SCNC: 97 MMOL/L (ref 98–107)
CO2 SERPL-SCNC: 25.5 MMOL/L (ref 22–29)
CREAT SERPL-MCNC: 0.63 MG/DL (ref 0.57–1)
DEPRECATED RDW RBC AUTO: 56.3 FL (ref 37–54)
EOSINOPHIL # BLD AUTO: 0.46 10*3/MM3 (ref 0–0.4)
EOSINOPHIL NFR BLD AUTO: 5.4 % (ref 0.3–6.2)
ERYTHROCYTE [DISTWIDTH] IN BLOOD BY AUTOMATED COUNT: 17.3 % (ref 12.3–15.4)
GFR SERPL CREATININE-BSD FRML MDRD: 96 ML/MIN/1.73
GLOBULIN UR ELPH-MCNC: 3 GM/DL
GLUCOSE SERPL-MCNC: 266 MG/DL (ref 65–99)
HCT VFR BLD AUTO: 42.4 % (ref 34–46.6)
HGB BLD-MCNC: 12.4 G/DL (ref 12–15.9)
IMM GRANULOCYTES # BLD AUTO: 0.04 10*3/MM3 (ref 0–0.05)
IMM GRANULOCYTES NFR BLD AUTO: 0.5 % (ref 0–0.5)
LYMPHOCYTES # BLD AUTO: 0.78 10*3/MM3 (ref 0.7–3.1)
LYMPHOCYTES NFR BLD AUTO: 9.2 % (ref 19.6–45.3)
MCH RBC QN AUTO: 26.2 PG (ref 26.6–33)
MCHC RBC AUTO-ENTMCNC: 29.2 G/DL (ref 31.5–35.7)
MCV RBC AUTO: 89.6 FL (ref 79–97)
MONOCYTES # BLD AUTO: 0.44 10*3/MM3 (ref 0.1–0.9)
MONOCYTES NFR BLD AUTO: 5.2 % (ref 5–12)
NEUTROPHILS NFR BLD AUTO: 6.67 10*3/MM3 (ref 1.7–7)
NEUTROPHILS NFR BLD AUTO: 78.6 % (ref 42.7–76)
NRBC BLD AUTO-RTO: 0 /100 WBC (ref 0–0.2)
PLATELET # BLD AUTO: 260 10*3/MM3 (ref 140–450)
PMV BLD AUTO: 10.8 FL (ref 6–12)
POTASSIUM SERPL-SCNC: 4.8 MMOL/L (ref 3.5–5.2)
PROT SERPL-MCNC: 7.6 G/DL (ref 6–8.5)
RBC # BLD AUTO: 4.73 10*6/MM3 (ref 3.77–5.28)
SODIUM SERPL-SCNC: 141 MMOL/L (ref 136–145)
WBC # BLD AUTO: 8.48 10*3/MM3 (ref 3.4–10.8)

## 2020-08-12 PROCEDURE — 36415 COLL VENOUS BLD VENIPUNCTURE: CPT

## 2020-08-12 PROCEDURE — 80053 COMPREHEN METABOLIC PANEL: CPT | Performed by: PHYSICIAN ASSISTANT

## 2020-08-12 PROCEDURE — 85025 COMPLETE CBC W/AUTO DIFF WBC: CPT | Performed by: PHYSICIAN ASSISTANT

## 2020-08-17 DIAGNOSIS — I25.10 CORONARY ARTERY DISEASE INVOLVING NATIVE CORONARY ARTERY OF NATIVE HEART WITHOUT ANGINA PECTORIS: ICD-10-CM

## 2020-08-17 DIAGNOSIS — E78.00 PURE HYPERCHOLESTEROLEMIA: ICD-10-CM

## 2020-08-18 RX ORDER — PRAVASTATIN SODIUM 40 MG
TABLET ORAL
Qty: 90 TABLET | Refills: 3 | Status: SHIPPED | OUTPATIENT
Start: 2020-08-18 | End: 2021-04-26

## 2020-08-19 ENCOUNTER — OUTSIDE FACILITY SERVICE (OUTPATIENT)
Dept: CARDIOLOGY | Facility: CLINIC | Age: 60
End: 2020-08-19

## 2020-08-19 PROCEDURE — 93459 L HRT ART/GRFT ANGIO: CPT | Performed by: INTERNAL MEDICINE

## 2020-08-20 ENCOUNTER — TELEPHONE (OUTPATIENT)
Dept: CARDIOLOGY | Facility: CLINIC | Age: 60
End: 2020-08-20

## 2020-08-20 NOTE — TELEPHONE ENCOUNTER
DR. SYED OUT OF THE OFFICE TODAY AND SYDNI. UNABLE TO ADDRESS SCHEDULE AND APPT. PATIENT AWARE I WILL DISCUSS WITH DR. SYED FIRST OF WEEK AND CALL HER BACK ON Monday OR TUES. VERBALIZED OK. PH,LPN    8- 4:27 PM APPT. SCHEDULED FOR SYDNI. WITH DR. SYED. PATIENT AWARE. LAUREN ORTIZ          ----- Message from Hanane Calvin sent at 8/20/2020 10:44 AM EDT -----  PT SAID SHE HAD A CATH YESTERDAY AND SHE HAS BLOCKAGES IN HER NECK AND DR. SYED TOLD HER TO GET AN APPOINTMENT WITH HIM ASAP, I TRIED TO RESCHEDULE THE CATH F/U SHE HAD WITH XIMENA AND SHE TOLD ME, HE SAID HE PERSONALLY WANTED TO SEE HER. LET ME KNOW WHEN I CAN ADD PATIENT IN. THANKS

## 2020-08-25 ENCOUNTER — OFFICE VISIT (OUTPATIENT)
Dept: CARDIOLOGY | Facility: CLINIC | Age: 60
End: 2020-08-25

## 2020-08-25 VITALS
BODY MASS INDEX: 28.61 KG/M2 | HEIGHT: 66 IN | DIASTOLIC BLOOD PRESSURE: 71 MMHG | WEIGHT: 178 LBS | SYSTOLIC BLOOD PRESSURE: 134 MMHG | HEART RATE: 104 BPM | OXYGEN SATURATION: 94 %

## 2020-08-25 DIAGNOSIS — I10 ESSENTIAL HYPERTENSION: Primary | ICD-10-CM

## 2020-08-25 DIAGNOSIS — R07.2 PRECORDIAL PAIN: ICD-10-CM

## 2020-08-25 PROCEDURE — 99213 OFFICE O/P EST LOW 20 MIN: CPT | Performed by: INTERNAL MEDICINE

## 2020-08-25 RX ORDER — CARVEDILOL 25 MG/1
25 TABLET ORAL 2 TIMES DAILY
Qty: 60 TABLET | Refills: 11 | Status: SHIPPED | OUTPATIENT
Start: 2020-08-25 | End: 2020-08-31 | Stop reason: SDUPTHER

## 2020-08-25 NOTE — PROGRESS NOTES
Subjective   Ira Stinson is a 60 y.o. female     Chief Complaint   Patient presents with   • Coronary Artery Disease     Here for [facility] f/u   • Hypertension   • Rapid Heart Rate     HX SVT   • Shortness of Breath       PROBLEM LIST:         Specialty Problems        Cardiology Problems    CAD s/p multiple stents; s/p CABG x3 (2000)        Essential hypertension        Palpitations        PVC (premature ventricular contraction)        Stenosis of carotid artery        SVT (supraventricular tachycardia) (CMS/HCC)        Bilateral carotid bruits        Pure hypercholesterolemia        Carotid stenosis s/p right carotid stent per Dr. Nazario 12/12/19                HPI:  Ms. Stinson returns for follow-up after recent cardiac catheterization that demonstrated well collateralized total occlusion of the right coronary artery.    At the time of that procedure the patient stated that she had no improvement after stenting the chronically totally occluded right coronary artery at the time of prior catheterization.  However, Ms. Stinson now states that she had complete eradication of her symptoms after stenting and that she was referring to no clinical benefit after carotid stenting done late last year.  Is unclear to me about the misunderstanding as we were very definitely discussing the patient's heart at the time of catheterization and I even reviewed angiograms with her with regard to collateralization of the distal right coronary artery.    Ms. Stinson continues to be without symptoms of peripheral arterial disease, arterial embolic events (to include TIA or stroke), dysrhythmia, or heart failure.        PRIOR MEDICATIONS    Current Outpatient Medications on File Prior to Visit   Medication Sig Dispense Refill   • ALPRAZolam (XANAX) 1 MG tablet Take 1 mg by mouth 2 (Two) Times a Day.  1   • budesonide (PULMICORT) 0.5 MG/2ML nebulizer solution U 2 ML VIA NEB BID     • budesonide-formoterol (SYMBICORT) 160-4.5 MCG/ACT inhaler  Inhale 2 puffs 2 (Two) Times a Day.     • clopidogrel (PLAVIX) 75 MG tablet Take 75 mg by mouth Daily.     • DULoxetine (CYMBALTA) 60 MG capsule Take 60 mg by mouth Daily.     • Evolocumab with Infusor 420 MG/3.5ML solution cartridge Inject 1 Cartridge under the skin into the appropriate area as directed Every 30 (Thirty) Days. 1 cartridge. 11   • Ferrous Sulfate Dried (FERROUS SULFATE CR PO) Take  by mouth. Takes occas.     • folic acid (FOLVITE) 1 MG tablet Take 1 mg by mouth Daily.     • furosemide (LASIX) 20 MG tablet Take  by mouth As Needed.     • isosorbide mononitrate (IMDUR) 60 MG 24 hr tablet Take 1 tablet by mouth Every Morning. 30 tablet 11   • lansoprazole (PREVACID) 30 MG capsule Take 30 mg by mouth Daily.     • levothyroxine (SYNTHROID, LEVOTHROID) 25 MCG tablet Take 25 mcg by mouth Daily.     • metFORMIN ER (GLUCOPHAGE-XR) 500 MG 24 hr tablet Take 500 mg by mouth Daily.  2   • montelukast (SINGULAIR) 10 MG tablet Take 1 tablet by mouth Every Night. 30 tablet 11   • nicotine (NICODERM CQ) 21 MG/24HR patch Place 1 patch on the skin as directed by provider Daily.     • pravastatin (PRAVACHOL) 40 MG tablet Take 1 tablet by mouth once daily 90 tablet 3   • ranolazine (RANEXA) 1000 MG 12 hr tablet Take 1 tablet by mouth Every 12 (Twelve) Hours. 60 tablet 6   • SPIRIVA RESPIMAT 2.5 MCG/ACT aerosol solution inhaler INHALE 2 SPRAY(S) BY MOUTH ONCE DAILY  3   • VENTOLIN  (90 Base) MCG/ACT inhaler As Needed.     • vitamin C (ASCORBIC ACID) 500 MG tablet Take 500 mg by mouth Daily.     • VITAMIN D PO Take  by mouth.     • Zinc 50 MG capsule Take  by mouth.     • aspirin 81 MG tablet Take 1 tablet by mouth Daily. 30 tablet 11   • diphenhydrAMINE (Benadryl Allergy) 25 MG tablet Take BID the day before the cath and q tab the day of the cath. 3 tablet 0   • doxycycline (VIBRAMYCIN) 100 MG capsule Take 1 capsule by mouth 2 (Two) Times a Day. 20 capsule 0   • famotidine (Pepcid) 20 MG tablet BID the day before  the cath and the am of the cath 3 tablet 0   • metoprolol tartrate (LOPRESSOR) 50 MG tablet Take 50 mg by mouth 2 (Two) Times a Day.     • nitroglycerin (NITROSTAT) 0.4 MG SL tablet Place 0.4 mg under the tongue Every 5 (Five) Minutes As Needed.     • predniSONE (DELTASONE) 20 MG tablet BID the day before the cath and the am of the cath 3 tablet 0     No current facility-administered medications on file prior to visit.        ALLERGIES:    Oxycodone hcl; Acetaminophen; Atorvastatin; Codeine; Hydrocodone bitartrate er; Lortab [hydrocodone-acetaminophen]; Percocet [oxycodone-acetaminophen]; Propoxyphene; Protonix [pantoprazole sodium]; Contrast dye; and Penicillins    PAST MEDICAL HISTORY:    Past Medical History:   Diagnosis Date   • Anxiety    • Arthritis    • Autoimmune disorder (CMS/HCC)    • Carotid artery stenosis    • Chest pain    • COPD (chronic obstructive pulmonary disease) (CMS/HCC)    • Coronary artery disease     stents times 6   • Depression    • Diabetes (CMS/HCC)     type 2 dm, check blood sugar 2 to 3 times a week   • GERD (gastroesophageal reflux disease)    • Goiter    • Heart murmur    • Hiatal hernia    • History of transfusion    • Hyperlipidemia    • Hypertension    • Hypothyroidism    • Iron deficiency anemia    • Leaky heart valve    • Lichen planus    • Palpitations     with previous monitoring indicating short-lived episodes of SVT and frequent PVCs.   • Peripheral vascular disease (CMS/HCC)     with history of right carotid endarterectomy and follow-up right endarterectomy, March 2011.   • PONV (postoperative nausea and vomiting)     severe   • PVC (premature ventricular contraction)    • PVD (peripheral vascular disease) (CMS/HCC)    • Shortness of breath    • SVT (supraventricular tachycardia) (CMS/HCC)    • Wears dentures     upper   • Wears reading eyeglasses        SURGICAL HISTORY:    Past Surgical History:   Procedure Laterality Date   • CARDIAC CATHETERIZATION  10/07/2019     patient reports having 6 or 7 heart caths with 6 stents total   • CAROTID ENDARTERECTOMY Right 03/14/2011    Dr. Sharif   • CATARACT EXTRACTION, BILATERAL     • COLONOSCOPY      2016   • CORONARY ARTERY BYPASS GRAFT  2000    X3   • CORONARY STENT PLACEMENT     • HERNIA REPAIR      Umbilical Hernia Repair X2   • [place] TCAT IV STENT CRV CRTD ART EMBOLIC PROTECJ Right 12/12/2019    Procedure: Carotid Stent;  Surgeon: Qamar Nazario MD;  [facility]: [facility] CATH INVASIVE LOCATION;  Service: Interventional Radiology   • TUBAL ABDOMINAL LIGATION         SOCIAL HISTORY:    Social History     Socioeconomic History   • Marital status:      Spouse name: Not on file   • Number of children: 2   • Years of education: Not on file   • Highest education level: Not on file   Occupational History   • Occupation: Nursing     Comment: disabled   Tobacco Use   • Smoking status: Current Every Day Smoker     Packs/day: 0.25     Years: 45.00     Pack years: 11.25     Types: Cigarettes   • Smokeless tobacco: Never Used   Substance and Sexual Activity   • Alcohol use: No   • Drug use: No   • Sexual activity: Defer   Social History Narrative    The patient lives with her daughter and  in [place].       FAMILY HISTORY:    Family History   Problem Relation Age of Onset   • Coronary artery disease Other    • Hyperlipidemia Other    • Hypertension Other    • Cancer Other    • Heart disease Mother    • Heart disease Father        Review of Systems   Constitutional: Negative.    HENT: Negative.    Eyes: Positive for visual disturbance (reading glasses).   Respiratory: Positive for shortness of breath (02 prn).    Cardiovascular: Positive for chest pain and palpitations. Negative for leg swelling.   Gastrointestinal: Negative.    Endocrine: Negative.    Genitourinary: Negative.    Musculoskeletal: Positive for arthralgias and myalgias.   Skin: Negative.    Allergic/Immunologic: Negative.    Neurological: Positive for dizziness and  "light-headedness. Negative for syncope.   Hematological: Bruises/bleeds easily.   Psychiatric/Behavioral: Negative.        VISIT VITALS:  Vitals:    08/25/20 1010   BP: 134/71   BP Location: Left arm   Patient Position: Sitting   Pulse: 104   SpO2: 94%   Weight: 80.7 kg (178 lb)   Height: 167.6 cm (65.98\")      /71 (BP Location: Left arm, Patient Position: Sitting)   Pulse 104   Ht 167.6 cm (65.98\")   Wt 80.7 kg (178 lb)   SpO2 94% Comment: 02 at 2 L via N/C  BMI 28.74 kg/m²     RECENT LABS:    Objective       Physical Exam    Procedures      Assessment/Plan   #1.  Coronary artery disease.  The patient had a repeat chronic total occlusion of the right coronary artery by most recent catheterization.  At that time the patient described no benefit from previous stenting but she is now adamant that her symptoms resolved after that procedure.  Therefore, I think it is reasonable to try to optimize medical therapy and reserve mechanical revascularization for refractory symptoms.  In that regard we will start carvedilol 25 mg twice daily and follow heart rate and blood pressure closely.    2.  Systemic hypertension.  This is been controlled in the recent past and should improve with carvedilol therapy.  The patient was cautioned reference bradycardia, cough and wheezing etc.    3.  Peripheral arterial disease patient status post carotid stenting.  The patient has had no symptoms of cerebral ischemia.  We will continue risk modification and antiplatelet therapy.    4.  Dyslipidemia.  LDL cholesterol was not at goal by most recent testing and the patient is not on high-dose statin.  We will address this at the patient's next visit here unless changes are made prior to that time through Dr. Whitfield's office.    5.  Ms. Stinson will follow with Dr. Whitfield as instructed, and in our office in 1 month or on a as needed basis for symptoms of hypotension, bradycardia, bronchospasm etc. as discussed in detail today.  No " diagnosis found.    No follow-ups on file.         Ira Stinson  reports that she has been smoking cigarettes. She has a 11.25 pack-year smoking history. She has never used smokeless tobacco.. I have educated her on the risk of diseases from using tobacco products such as cancer, COPD and heart diease.               Patient's Body mass index is 28.74 kg/m². BMI is above normal parameters. Recommendations include: educational material and referral to primary care.       Fawn Owens LPN    Scribed for Dr. Carlos Cervantes by Fawn Owens LPN 08- 10:10 am         Electronically signed by:            This note is dictated utilizing voice recognition software.  Although this record has been proof read, transcriptional errors may still be present. If questions occur regarding the content of this record please do not hesitate to call our office.

## 2020-08-31 ENCOUNTER — TELEPHONE (OUTPATIENT)
Dept: CARDIOLOGY | Facility: CLINIC | Age: 60
End: 2020-08-31

## 2020-08-31 DIAGNOSIS — R07.2 PRECORDIAL PAIN: ICD-10-CM

## 2020-08-31 DIAGNOSIS — I10 ESSENTIAL HYPERTENSION: ICD-10-CM

## 2020-08-31 RX ORDER — CARVEDILOL 12.5 MG/1
12.5 TABLET ORAL 2 TIMES DAILY
Qty: 60 TABLET | Refills: 11 | Status: SHIPPED | OUTPATIENT
Start: 2020-08-31 | End: 2020-09-10 | Stop reason: ALTCHOICE

## 2020-08-31 NOTE — TELEPHONE ENCOUNTER
Per Hardik Victor, PAC decrease coreg to 12.5 mg po bid. Patient called aware and to call the office back in a week or so if sx's does not improve or worsen, Verbalized ok. PH,LPN      ----- Message from Erna Crowley sent at 8/31/2020  8:39 AM EDT -----  Pt LVM stating that she needed to speak w/ Dr. Cervantes's nurse regd her medications.   #[phone]      Per chart review, pt saw Dr. Cervantes on 8/25/2020 and was started on Carvedilol 25mg BID, states that we will follow HR and BP closely.       Called pt back, she stated it helps a lot w/ her HR, but it makes her severely dizzy and lightheaded, nauseated, weakness, and has almost passed out twice. Wants to know if her dose can be reduced, as sx just started since new rx. States she was severely SoA x3 days. I asked what her BP has been: around 110/68 HR 80's.

## 2020-09-10 RX ORDER — METOPROLOL SUCCINATE 50 MG/1
50 TABLET, EXTENDED RELEASE ORAL DAILY
Qty: 30 TABLET | Refills: 11 | Status: SHIPPED | OUTPATIENT
Start: 2020-09-10 | End: 2020-09-24 | Stop reason: ALTCHOICE

## 2020-09-14 ENCOUNTER — TELEPHONE (OUTPATIENT)
Dept: CARDIOLOGY | Facility: CLINIC | Age: 60
End: 2020-09-14

## 2020-09-14 NOTE — TELEPHONE ENCOUNTER
Pt called and lm on vmb stating she is confused about medications. Called and spoke to pt. Pt was rx'd carvedilol 25 mg by Dr. Cervantes. Pt did not tolerate 25 mg and was instructed by NB to decrease it to 12.5 mg. Pt reports HR is 110-120. Pt was also rx'd metoprolol 50 mg BID. Pt does not know if she is supposed to continue carvedilol with metoprolol. Please review and advise.

## 2020-09-15 NOTE — TELEPHONE ENCOUNTER
She is not to be on both of those medication.  Have her stop metoprolol.  Increase carvedilol back to 25 mg twice a day.  It was likely that carvedilol and metoprolol was causing her to feel bad.

## 2020-09-15 NOTE — TELEPHONE ENCOUNTER
"Pt contacts office stating she stopped metoprolol 8/25/20.  Verbalizes, \"carvediolol 25 mg BID is to much medication.  It causes me to be nauseated, SOB, and dizzy.  If I take 12.5 mg BID, the medicine don't last as long and my blood pressure is curtis high before my next dose is due.\"  Discussed with JANNA David.  Recommends pt take carvediolol 12.5 mg 1 1/2 tablets BID.  Pt to contact our office if she is still having issues.  Pt made aware of changes.  She is agreeable to medication changes and verbalizes understanding.    "

## 2020-09-24 ENCOUNTER — OFFICE VISIT (OUTPATIENT)
Dept: CARDIOLOGY | Facility: CLINIC | Age: 60
End: 2020-09-24

## 2020-09-24 VITALS
DIASTOLIC BLOOD PRESSURE: 63 MMHG | HEART RATE: 89 BPM | OXYGEN SATURATION: 92 % | WEIGHT: 176.6 LBS | HEIGHT: 66 IN | SYSTOLIC BLOOD PRESSURE: 106 MMHG | BODY MASS INDEX: 28.38 KG/M2

## 2020-09-24 DIAGNOSIS — I10 ESSENTIAL HYPERTENSION: ICD-10-CM

## 2020-09-24 DIAGNOSIS — Z72.0 TOBACCO USE: ICD-10-CM

## 2020-09-24 DIAGNOSIS — R06.02 SHORTNESS OF BREATH: ICD-10-CM

## 2020-09-24 DIAGNOSIS — R07.2 PRECORDIAL PAIN: ICD-10-CM

## 2020-09-24 DIAGNOSIS — I49.3 PVC (PREMATURE VENTRICULAR CONTRACTION): ICD-10-CM

## 2020-09-24 DIAGNOSIS — R00.2 PALPITATIONS: ICD-10-CM

## 2020-09-24 DIAGNOSIS — Z86.79 HISTORY OF CAROTID ARTERY DISEASE: ICD-10-CM

## 2020-09-24 DIAGNOSIS — I65.29 STENOSIS OF CAROTID ARTERY, UNSPECIFIED LATERALITY: ICD-10-CM

## 2020-09-24 DIAGNOSIS — I47.1 SVT (SUPRAVENTRICULAR TACHYCARDIA) (HCC): ICD-10-CM

## 2020-09-24 DIAGNOSIS — E78.00 PURE HYPERCHOLESTEROLEMIA: ICD-10-CM

## 2020-09-24 DIAGNOSIS — I25.10 CORONARY ARTERY DISEASE INVOLVING NATIVE CORONARY ARTERY OF NATIVE HEART WITHOUT ANGINA PECTORIS: Primary | ICD-10-CM

## 2020-09-24 DIAGNOSIS — I65.21 CAROTID STENOSIS, ASYMPTOMATIC, RIGHT: ICD-10-CM

## 2020-09-24 PROCEDURE — 99214 OFFICE O/P EST MOD 30 MIN: CPT | Performed by: INTERNAL MEDICINE

## 2020-09-24 RX ORDER — CARVEDILOL 12.5 MG/1
18.75 TABLET ORAL 2 TIMES DAILY WITH MEALS
COMMUNITY
End: 2020-11-06 | Stop reason: SDUPTHER

## 2020-09-24 NOTE — PROGRESS NOTES
Subjective   Ira Stinson is a 60 y.o. female     Chief Complaint   Patient presents with   • Coronary Artery Disease     Here for 1 mo. f/u   • Hypertension   • Hyperlipidemia   • Rapid Heart Rate     HX SVT       PROBLEM LIST:     1. Coronary artery disease   1.1 History of CABG with follow-up stenting.  1.2 Recent catheterization, May 2014 with PTCA for IntraStent restenosis in the free radial to the RCA.  1.3. Repeat catheterization in July 2014 due to new 70-80% stenosis with angioplasty, subsequent dissection and stenting. Recommendations to consider single vessel bypass due to high grade stenosis  1.4 coronary artery bypass grafting with LIMA to LAD, vein graft to right coronary artery, vein graft to circumflex. Follow-up catheterization in 2014 demonstrated an occluded vein graft to RCA with stenting of the right coronary artery.  1.4 stress test September 2016 demonstrated posterolateral ischemia and preserved LV function  1.5 follow-up catheterization in November 2016 demonstrated high-grade disease involving the vein graft to the circumflex as well as the arterial graft to the PDA. There was high-grade disease of the native right coronary artery. Patient underwent stenting of the vein graft to circumflex, PTCA and stenting of the vein graft to the right coronary artery and stenting of the native right coronary artery.  1.6 stress test August 2019 suggest no evidence of ischemia preserved LV function  1.7 continued pain on antianginal therapy  1.8 cardiac catheterization October 2019 demonstrated high-grade RCA disease.  There was subtotal IntraStent restenosis as well as distal disease.  Patient underwent stenting x2.  Vein graft to circumflex was patent as well as LIMA.  Occluded native circumflex.  Medical management recommended  2. Peripheral vascular disease with history of right carotid endarterectomy and follow-up right endarterectomy, March 2011.  2.1 The patient has residual left carotid  stenosis, which was felt nonobstructive and not severe enough for mechanical intervention.  2.2 carotid duplex demonstrated 50 to 70% proximal right internal carotid artery stenosis.  2.3 carotid stenting by DR. Fatmata GRIDER  3. Chronic palpitations with previous monitoring indicating short-lived episodes of SVT and frequent PVCs.  4. Hypertension  5. Dyslipidemia  6. Hypothyroidism  7.  Mild aortic stenosis by echocardiogram 2016  7.1 mild aortic insufficiency by echocardiogram July 2019      Specialty Problems        Cardiology Problems    CAD s/p multiple stents; s/p CABG x3 (2000)        Essential hypertension        Palpitations        PVC (premature ventricular contraction)        Stenosis of carotid artery        SVT (supraventricular tachycardia) (CMS/HCC)        Bilateral carotid bruits        Pure hypercholesterolemia        Carotid stenosis s/p right carotid stent per Dr. Nazario 12/12/19                HPI:  Ms. Stinson returns for follow-up to assess response to therapy.    We started beta-blocker therapy at the time of her last visit after catheterization demonstrated recurrent chronic occlusion of the right coronary artery which was well collateralized from the left.  At the time of cardiac catheterization she mentioned that she had no clinical improvement after prior PCI, therefore repeat intervention for recurrent stenosis was not performed.    Since starting carvedilol she has had no further angina.  She has had no worsening and perhaps some improvement in exertional dyspnea but she continues to complain of anergy and easy fatigability.  She describes nocturnal awakening but this most likely represents her chronic lung disease as typically she uses an inhaler and is able to go back to bed quickly.  She has no orthopnea or lower extremity edema.  She has only minimal orthostatic lightheadedness, no presyncope or syncope, no claudication, and no stroke symptoms.                        PRIOR  MEDICATIONS    Current Outpatient Medications on File Prior to Visit   Medication Sig Dispense Refill   • ALPRAZolam (XANAX) 1 MG tablet Take 1 mg by mouth 2 (Two) Times a Day.  1   • budesonide (PULMICORT) 0.5 MG/2ML nebulizer solution U 2 ML VIA NEB BID     • budesonide-formoterol (SYMBICORT) 160-4.5 MCG/ACT inhaler Inhale 2 puffs 2 (Two) Times a Day.     • carvedilol (COREG) 12.5 MG tablet Take 18.75 mg by mouth 2 (Two) Times a Day With Meals.     • clopidogrel (PLAVIX) 75 MG tablet Take 75 mg by mouth Daily.     • DULoxetine (CYMBALTA) 60 MG capsule Take 60 mg by mouth Daily.     • Evolocumab with Infusor 420 MG/3.5ML solution cartridge Inject 1 Cartridge under the skin into the appropriate area as directed Every 30 (Thirty) Days. 1 cartridge. 11   • folic acid (FOLVITE) 1 MG tablet Take 1 mg by mouth Daily.     • furosemide (LASIX) 20 MG tablet Take  by mouth As Needed. Taking M/W/F     • isosorbide mononitrate (IMDUR) 60 MG 24 hr tablet Take 1 tablet by mouth Every Morning. 30 tablet 11   • lansoprazole (PREVACID) 30 MG capsule Take 30 mg by mouth Daily.     • levothyroxine (SYNTHROID, LEVOTHROID) 25 MCG tablet Take 25 mcg by mouth Daily.     • metFORMIN ER (GLUCOPHAGE-XR) 500 MG 24 hr tablet Take 500 mg by mouth Daily.  2   • montelukast (SINGULAIR) 10 MG tablet Take 1 tablet by mouth Every Night. 30 tablet 11   • nicotine (NICODERM CQ) 21 MG/24HR patch Place 1 patch on the skin as directed by provider Daily.     • pravastatin (PRAVACHOL) 40 MG tablet Take 1 tablet by mouth once daily 90 tablet 3   • ranolazine (RANEXA) 1000 MG 12 hr tablet Take 1 tablet by mouth Every 12 (Twelve) Hours. 60 tablet 6   • SPIRIVA RESPIMAT 2.5 MCG/ACT aerosol solution inhaler INHALE 2 SPRAY(S) BY MOUTH ONCE DAILY  3   • VENTOLIN  (90 Base) MCG/ACT inhaler As Needed.     • vitamin C (ASCORBIC ACID) 500 MG tablet Take 500 mg by mouth Daily.     • VITAMIN D PO Take  by mouth.     • Zinc 50 MG capsule Take  by mouth.     •  nitroglycerin (NITROSTAT) 0.4 MG SL tablet Place 0.4 mg under the tongue Every 5 (Five) Minutes As Needed.     • [DISCONTINUED] aspirin 81 MG tablet Take 1 tablet by mouth Daily. 30 tablet 11   • [DISCONTINUED] Ferrous Sulfate Dried (FERROUS SULFATE CR PO) Take  by mouth. Takes occas.     • [DISCONTINUED] metoprolol succinate XL (TOPROL-XL) 50 MG 24 hr tablet Take 1 tablet by mouth Daily. 30 tablet 11     No current facility-administered medications on file prior to visit.        ALLERGIES:    Oxycodone hcl, Acetaminophen, Atorvastatin, Codeine, Hydrocodone bitartrate er, Lortab [hydrocodone-acetaminophen], Percocet [oxycodone-acetaminophen], Propoxyphene, Protonix [pantoprazole sodium], Contrast dye, and Penicillins    PAST MEDICAL HISTORY:    Past Medical History:   Diagnosis Date   • Anxiety    • Arthritis    • Autoimmune disorder (CMS/HCC)    • Carotid artery stenosis    • Chest pain    • COPD (chronic obstructive pulmonary disease) (CMS/[facility])    • Coronary artery disease     stents times 6   • Depression    • Diabetes (CMS/[facility])     type 2 dm, check blood sugar 2 to 3 times a week   • GERD (gastroesophageal reflux disease)    • Goiter    • Heart murmur    • Hiatal hernia    • History of transfusion    • Hyperlipidemia    • Hypertension    • Hypothyroidism    • Iron deficiency anemia    • Leaky heart valve    • Lichen planus    • Palpitations     with previous monitoring indicating short-lived episodes of SVT and frequent PVCs.   • Peripheral vascular disease (CMS/HCC)     with history of right carotid endarterectomy and follow-up right endarterectomy, March 2011.   • PONV (postoperative nausea and vomiting)     severe   • PVC (premature ventricular contraction)    • PVD (peripheral vascular disease) (CMS/HCC)    • Shortness of breath    • SVT (supraventricular tachycardia) (CMS/HCC)    • Wears dentures     upper   • Wears reading eyeglasses        SURGICAL HISTORY:    Past Surgical History:   Procedure Laterality  Date   • CARDIAC CATHETERIZATION  10/07/2019    patient reports having 6 or 7 heart caths with 6 stents total   • CAROTID ENDARTERECTOMY Right 03/14/2011    Dr. Sharif   • CATARACT EXTRACTION, BILATERAL     • COLONOSCOPY      2016   • CORONARY ARTERY BYPASS GRAFT  2000    X3   • CORONARY STENT PLACEMENT     • HERNIA REPAIR      Umbilical Hernia Repair X2   • ID TCAT IV STENT CRV CRTD ART EMBOLIC PROTECJ Right 12/12/2019    Procedure: Carotid Stent;  Surgeon: Qamar Nazario MD;  Location: [facility] INVASIVE LOCATION;  Service: Interventional Radiology   • TUBAL ABDOMINAL LIGATION         SOCIAL HISTORY:    Social History     Socioeconomic History   • Marital status:      Spouse name: Not on file   • Number of children: 2   • Years of education: Not on file   • Highest education level: Not on file   Occupational History   • Occupation: Nursing     Comment: disabled   Tobacco Use   • Smoking status: Current Every Day Smoker     Packs/day: 0.25     Years: 45.00     Pack years: 11.25     Types: Cigarettes   • Smokeless tobacco: Never Used   Substance and Sexual Activity   • Alcohol use: No   • Drug use: No   • Sexual activity: Defer   Social History Narrative    The patient lives with her daughter and  in [place].       FAMILY HISTORY:    Family History   Problem Relation Age of Onset   • Coronary artery disease Other    • Hyperlipidemia Other    • Hypertension Other    • Cancer Other    • Heart disease Mother    • Heart disease Father        Review of Systems   Constitutional: Positive for fatigue (easily).   HENT: Positive for postnasal drip.    Eyes: Positive for visual disturbance (wears glasses prn).   Respiratory: Positive for shortness of breath (wears 02) and wheezing.    Cardiovascular: Positive for palpitations. Negative for chest pain and leg swelling.   Gastrointestinal: Negative.    Endocrine: Negative.    Genitourinary: Negative.    Musculoskeletal: Positive for arthralgias and myalgias.  "  Skin: Negative.    Allergic/Immunologic: Positive for environmental allergies.   Neurological: Positive for dizziness, weakness (overall) and light-headedness.        With getting up to quick   Hematological: Bruises/bleeds easily.   Psychiatric/Behavioral: Negative.        VISIT VITALS:  Vitals:    09/24/20 1107   BP: 106/63   BP Location: Left arm   Patient Position: Sitting   Pulse: 89   SpO2: 92%   Weight: 80.1 kg (176 lb 9.6 oz)   Height: 167.6 cm (65.98\")      /63 (BP Location: Left arm, Patient Position: Sitting)   Pulse 89   Ht 167.6 cm (65.98\")   Wt 80.1 kg (176 lb 9.6 oz)   SpO2 92% Comment: on 02 at 2-3 L via N/C  BMI 28.52 kg/m²     RECENT LABS:    Objective       Physical Exam  Vitals signs and nursing note reviewed.   Constitutional:       General: She is not in acute distress.     Appearance: She is well-developed.   HENT:      Head: Normocephalic and atraumatic.   Eyes:      Conjunctiva/sclera: Conjunctivae normal.      Pupils: Pupils are equal, round, and reactive to light.   Neck:      Musculoskeletal: Normal range of motion and neck supple.      Vascular: No hepatojugular reflux or JVD.      Trachea: No tracheal deviation.   Cardiovascular:      Rate and Rhythm: Normal rate and regular rhythm.      Heart sounds: S1 normal and S2 normal. No murmur. No friction rub. Gallop present. S4 sounds present. No S3 sounds.    Pulmonary:      Effort: Pulmonary effort is normal.      Breath sounds: Decreased breath sounds (moderately) present. No wheezing, rhonchi or rales.   Abdominal:      General: Bowel sounds are normal. There is no distension.      Palpations: Abdomen is soft. There is no mass.      Tenderness: There is no abdominal tenderness. There is no guarding or rebound.   Musculoskeletal: Normal range of motion.         General: No tenderness or deformity.      Comments: LLE, no edema, palpable pedal pulses  RLE, no edema, palpable pedal pulses   Skin:     General: Skin is warm and " dry.      Coloration: Skin is not pale.      Findings: No erythema or rash.   Neurological:      Mental Status: She is alert and oriented to person, place, and time.   Psychiatric:         Behavior: Behavior normal.         Thought Content: Thought content normal.         Judgment: Judgment normal.         Procedures      Assessment/Plan   #1.  Coronary artery disease with recurrent occlusion of the right coronary artery well collateralized from the left.  Is a patient is currently symptom-free on medications we will continue current therapy and reserve repeat PCI for medically refractory symptoms.  In that regard the patient was given instructions to report any symptoms which might represent clinically significant cardiovascular events.    2.  Treated and controlled systemic hypertension.    3.  Treated dyslipidemia.    4.  Peripheral arterial disease status post carotid stenting.  The patient is asymptomatic of cerebral ischemia.    5.  Current medications will be continued.  Kaylan Shantell to consider efforts at smoking cessation.  She will follow with Dr. Whitfield as instructed and in our office in 1 year or on a as needed basis as discussed.       Diagnosis Plan   1. Coronary artery disease involving native coronary artery of native heart without angina pectoris     2. Carotid stenosis s/p right carotid stent per Dr. Nazario 12/12/19     3. Essential hypertension     4. Palpitations     5. Pure hypercholesterolemia     6. PVC (premature ventricular contraction)     7. Stenosis of carotid artery, unspecified laterality     8. SVT (supraventricular tachycardia) (CMS/HCC)     9. Shortness of breath     10. Precordial pain     11. Hx carotid artery disease s/p R CEAx2 (2011)     12. Ongoing tobacco use         No follow-ups on file.         Ira Stinson  reports that she has been smoking cigarettes. She has a 11.25 pack-year smoking history. She has never used smokeless tobacco.. I have educated her on the risk of  diseases from using tobacco products such as cancer, COPD and heart diease. Patient is trying to quit smoking. Down to 1-2 cigs. Daily. Using nicotine patch.               Patient's Body mass index is 28.52 kg/m². BMI is above normal parameters. Recommendations include: educational material and referral to primary care.       Fawn Owens LPN    Scribed for Dr. Carlos Cervantes by Fawn Owens LPN September 24, 2020 11:21 EDT         Electronically signed by:            This note is dictated utilizing voice recognition software.  Although this record has been proof read, transcriptional errors may still be present. If questions occur regarding the content of this record please do not hesitate to call our office.

## 2020-09-24 NOTE — PATIENT INSTRUCTIONS
Obesity, Adult  Obesity is the condition of having too much total body fat. Being overweight or obese means that your weight is greater than what is considered healthy for your body size. Obesity is determined by a measurement called BMI. BMI is an estimate of body fat and is calculated from height and weight. For adults, a BMI of 30 or higher is considered obese.  Obesity can lead to other health concerns and major illnesses, including:  · Stroke.  · Coronary artery disease (CAD).  · Type 2 diabetes.  · Some types of cancer, including cancers of the colon, breast, uterus, and gallbladder.  · Osteoarthritis.  · High blood pressure (hypertension).  · High cholesterol.  · Sleep apnea.  · Gallbladder stones.  · Infertility problems.  What are the causes?  Common causes of this condition include:  · Eating daily meals that are high in calories, sugar, and fat.  · Being born with genes that may make you more likely to become obese.  · Having a medical condition that causes obesity, including:  ? Hypothyroidism.  ? Polycystic ovarian syndrome (PCOS).  ? Binge-eating disorder.  ? Cushing syndrome.  · Taking certain medicines, such as steroids, antidepressants, and seizure medicines.  · Not being physically active (sedentary lifestyle).  · Not getting enough sleep.  · Drinking high amounts of sugar-sweetened beverages, such as soft drinks.  What increases the risk?  The following factors may make you more likely to develop this condition:  · Having a family history of obesity.  · Being a woman of  descent.  · Being a man of  descent.  · Living in an area with limited access to:  ? [place], recreation centers, or sidewalks.  ? Healthy food choices, such as grocery stores and farmers' markets.  What are the signs or symptoms?  The main sign of this condition is having too much body fat.  How is this diagnosed?  This condition is diagnosed based on:  · Your BMI. If you are an adult with a BMI of 30 or  higher, you are considered obese.  · Your waist circumference. This measures the distance around your waistline.  · Your skinfold thickness. Your health care provider may gently pinch a fold of your skin and measure it.  You may have other tests to check for underlying conditions.  How is this treated?  Treatment for this condition often includes changing your lifestyle. Treatment may include some or all of the following:  · Dietary changes. This may include developing a healthy meal plan.  · Regular physical activity. This may include activity that causes your heart to beat faster (aerobic exercise) and strength training. Work with your health care provider to design an exercise program that works for you.  · Medicine to help you lose weight if you are unable to lose 1 pound a week after 6 weeks of healthy eating and more physical activity.  · Treating conditions that cause the obesity (underlying conditions).  · Surgery. Surgical options may include gastric banding and gastric bypass. Surgery may be done if:  ? Other treatments have not helped to improve your condition.  ? You have a BMI of 40 or higher.  ? You have life-threatening health problems related to obesity.  Follow these instructions at home:  Eating and drinking    · Follow recommendations from your health care provider about what you eat and drink. Your health care provider may advise you to:  ? Limit fast food, sweets, and processed snack foods.  ? Choose low-fat options, such as low-fat milk instead of whole milk.  ? Eat 5 or more servings of fruits or vegetables every day.  ? Eat at home more often. This gives you more control over what you eat.  ? Choose healthy foods when you eat out.  ? Learn to read food labels. This will help you understand how much food is considered 1 serving.  ? Learn what a healthy serving size is.  ? Keep low-fat snacks available.  ? Limit sugary drinks, such as soda, fruit juice, sweetened iced tea, and flavored  milk.  · Drink enough water to keep your urine pale yellow.  · Do not follow a fad diet. Fad diets can be unhealthy and even dangerous.  Physical activity  · Exercise regularly, as told by your health care provider.  ? Most adults should get up to 150 minutes of moderate-intensity exercise every week.  ? Ask your health care provider what types of exercise are safe for you and how often you should exercise.  · Warm up and stretch before being active.  · Cool down and stretch after being active.  · Rest between periods of activity.  Lifestyle  · Work with your health care provider and a dietitian to set a weight-loss goal that is healthy and reasonable for you.  · Limit your screen time.  · Find ways to reward yourself that do not involve food.  · Do not drink alcohol if:  ? Your health care provider tells you not to drink.  ? You are pregnant, may be pregnant, or are planning to become pregnant.  · If you drink alcohol:  ? Limit how much you use to:  § 0-1 drink a day for women.  § 0-2 drinks a day for men.  ? Be aware of how much alcohol is in your drink. In the [place] one drink equals one 12 oz bottle of beer (355 mL), one 5 oz glass of wine (148 mL), or one 1½ oz glass of hard liquor (44 mL).  General instructions  · Keep a weight-loss journal to keep track of the food you eat and how much exercise you get.  · Take over-the-counter and prescription medicines only as told by your health care provider.  · Take vitamins and supplements only as told by your health care provider.  · Consider joining a support group. Your health care provider may be able to recommend a support group.  · Keep all follow-up visits as told by your health care provider. This is important.  Contact a health care provider if:  · You are unable to meet your weight loss goal after 6 weeks of dietary and lifestyle changes.  Get help right away if you are having:  · Trouble breathing.  · Suicidal thoughts or behaviors.  Summary  · Obesity is the  condition of having too much total body fat.  · Being overweight or obese means that your weight is greater than what is considered healthy for your body size.  · Work with your health care provider and a dietitian to set a weight-loss goal that is healthy and reasonable for you.  · Exercise regularly, as told by your health care provider. Ask your health care provider what types of exercise are safe for you and how often you should exercise.  This information is not intended to replace advice given to you by your health care provider. Make sure you discuss any questions you have with your health care provider.  Document Released: 01/25/2006 Document Revised: 08/22/2019 Document Reviewed: 08/22/2019  [employer] Patient Education © 2020 [employer]  MyPlate from [employer]    MyPlate is an outline of a general healthy diet based on the 2010 Dietary Guidelines for Americans, from the [place] Department of Agriculture (USDA). It sets guidelines for how much food you should eat from each food group based on your age, sex, and level of physical activity.  What are tips for following MyPlate?  To follow MyPlate recommendations:  · Eat a wide variety of fruits and vegetables, grains, and protein foods.  · Serve smaller portions and eat less food throughout the day.  · Limit portion sizes to avoid overeating.  · Enjoy your food.  · Get at least 150 minutes of exercise every week. This is about 30 minutes each day, 5 or more days per week.  It can be difficult to have every meal look like MyPlate. Think about MyPlate as eating guidelines for an entire day, rather than each individual meal.  Fruits and vegetables  · Make half of your plate fruits and vegetables.  · Eat many different colors of fruits and vegetables each day.  · For a 2,000 calorie daily food plan, eat:  ? 2½ cups of vegetables every day.  ? 2 cups of fruit every day.  · 1 cup is equal to:  ? 1 cup raw or cooked vegetables.  ? 1 cup raw fruit.  ? 1 medium-sized orange,  apple, or banana.  ? 1 cup 100% fruit or vegetable juice.  ? 2 cups raw leafy greens, such as lettuce, spinach, or kale.  ? ½ cup dried fruit.  Grains  · One fourth of your plate should be grains.  · Make at least half of the grains you eat each day whole grains.  · For a 2,000 calorie daily food plan, eat 6 oz of grains every day.  · 1 oz is equal to:  ? 1 slice bread.  ? 1 cup cereal.  ? ½ cup cooked rice, cereal, or pasta.  Protein  · One fourth of your plate should be protein.  · Eat a wide variety of protein foods, including meat, poultry, fish, eggs, beans, nuts, and tofu.  · For a 2,000 calorie daily food plan, eat 5½ oz of protein every day.  · 1 oz is equal to:  ? 1 oz meat, poultry, or fish.  ? ¼ cup cooked beans.  ? 1 egg.  ? ½ oz nuts or seeds.  ? 1 Tbsp peanut butter.  Dairy  · Drink fat-free or low-fat (1%) milk.  · Eat or drink dairy as a side to meals.  · For a 2,000 calorie daily food plan, eat or drink 3 cups of dairy every day.  · 1 cup is equal to:  ? 1 cup milk, yogurt, cottage cheese, or soy milk (soy beverage).  ? 2 oz processed cheese.  ? 1½ oz natural cheese.  Fats, oils, salt, and sugars  · Only small amounts of oils are recommended.  · Avoid foods that are high in calories and low in nutritional value (empty calories), like foods high in fat or added sugars.  · Choose foods that are low in salt (sodium). Choose foods that have less than 140 milligrams (mg) of sodium per serving.  · Drink water instead of sugary drinks. Drink enough water each day to keep your urine pale yellow.  Where to find support  · Work with your health care provider or a nutrition specialist (dietitian) to develop a customized eating plan that is right for you.  · Download an [employer]ina (mobile application) to help you track your daily food intake.  Where to find more information  · Go to ChooseMyPlate.gov for more information.  Summary  · MyPlate is a general guideline for healthy eating from the USDA. It is based on the  2010 Dietary Guidelines for Americans.  · In general, fruits and vegetables should take up ½ of your plate, grains should take up ¼ of your plate, and protein should take up ¼ of your plate.  This information is not intended to replace advice given to you by your health care provider. Make sure you discuss any questions you have with your health care provider.  Document Released: 01/06/2009 Document Revised: 05/21/2020 Document Reviewed: 03/19/2018  [employer] Patient Education © 2020 [employer]  For more information:    Quit Now [facility]  1-800-QUIT-NOW  https://[facility].quitlogix.org/en-[place]/  Steps to Quit Smoking  Smoking tobacco can be harmful to your health and can affect almost every organ in your body. Smoking puts you, and those around you, at risk for developing many serious chronic diseases. Quitting smoking is difficult, but it is one of the best things that you can do for your health. It is never too late to quit.  What are the benefits of quitting smoking?  When you quit smoking, you lower your risk of developing serious diseases and conditions, such as:  · Lung cancer or lung disease, such as COPD.  · Heart disease.  · Stroke.  · Heart attack.  · Infertility.  · Osteoporosis and bone fractures.  Additionally, symptoms such as coughing, wheezing, and shortness of breath may get better when you quit. You may also find that you get sick less often because your body is stronger at fighting off colds and infections. If you are pregnant, quitting smoking can help to reduce your chances of having a baby of low birth weight.  How do I get ready to quit?  When you decide to quit smoking, create a plan to make sure that you are successful. Before you quit:  · Pick a date to quit. Set a date within the next two weeks to give you time to prepare.  · Write down the reasons why you are quitting. Keep this list in places where you will see it often, such as on your bathroom mirror or in your car or  wallet.  · Identify the people, places, things, and activities that make you want to smoke (triggers) and avoid them. Make sure to take these actions:  ¨ Throw away all cigarettes at home, at work, and in your car.  ¨ Throw away smoking accessories, such as ashtrays and lighters.  ¨ Clean your car and make sure to empty the ashtray.  ¨ Clean your home, including curtains and carpets.  · Tell your family, friends, and coworkers that you are quitting. Support from your loved ones can make quitting easier.  · Talk with your health care provider about your options for quitting smoking.  · Find out what treatment options are covered by your health insurance.  What strategies can I use to quit smoking?  Talk with your healthcare provider about different strategies to quit smoking. Some strategies include:  · Quitting smoking altogether instead of gradually lessening how much you smoke over a period of time. Research shows that quitting “cold turkey” is more successful than gradually quitting.  · Attending in-person counseling to help you build problem-solving skills. You are more likely to have success in quitting if you attend several counseling sessions. Even short sessions of 10 minutes can be effective.  · Finding resources and support systems that can help you to quit smoking and remain smoke-free after you quit. These resources are most helpful when you use them often. They can include:  ¨ Online chats with a counselor.  ¨ Telephone quitlines.  ¨ Printed self-help materials.  ¨ Support groups or group counseling.  ¨ Text messaging programs.  ¨ Mobile phone applications.  · Taking medicines to help you quit smoking. (If you are pregnant or breastfeeding, talk with your health care provider first.) Some medicines contain nicotine and some do not. Both types of medicines help with cravings, but the medicines that include nicotine help to relieve withdrawal symptoms. Your health care provider may recommend:  ¨ Nicotine  patches, gum, or lozenges.  ¨ Nicotine inhalers or sprays.  ¨ Non-nicotine medicine that is taken by mouth.  Talk with your health care provider about combining strategies, such as taking medicines while you are also receiving in-person counseling. Using these two strategies together makes you more likely to succeed in quitting than if you used either strategy on its own.  If you are pregnant or breastfeeding, talk with your health care provider about finding counseling or other support strategies to quit smoking. Do not take medicine to help you quit smoking unless told to do so by your health care provider.  What things can I do to make it easier to quit?  Quitting smoking might feel overwhelming at first, but there is a lot that you can do to make it easier. Take these important actions:  · Reach out to your family and friends and ask that they support and encourage you during this time. Call telephone quitlines, reach out to support groups, or work with a counselor for support.  · Ask people who smoke to avoid smoking around you.  · Avoid places that trigger you to smoke, such as bars, parties, or smoke-break areas at work.  · Spend time around people who do not smoke.  · Lessen stress in your life, because stress can be a smoking trigger for some people. To lessen stress, try:  ¨ Exercising regularly.  ¨ Deep-breathing exercises.  ¨ Yoga.  ¨ Meditating.  ¨ Performing a body scan. This involves closing your eyes, scanning your body from head to toe, and noticing which parts of your body are particularly tense. Purposefully relax the muscles in those areas.  · Download or purchase mobile phone or tablet apps (applications) that can help you stick to your quit plan by providing reminders, tips, and encouragement. There are many free apps, such as QuitGuide from the CDC (Centers for Disease Control and Prevention). You can find other support for quitting smoking (smoking cessation) through smokefree.gov and other  websites.  How will I feel when I quit smoking?  Within the first 24 hours of quitting smoking, you may start to feel some withdrawal symptoms. These symptoms are usually most noticeable 2-3 days after quitting, but they usually do not last beyond 2-3 weeks. Changes or symptoms that you might experience include:  · Mood swings.  · Restlessness, anxiety, or irritation.  · Difficulty concentrating.  · Dizziness.  · Strong cravings for sugary foods in addition to nicotine.  · Mild weight gain.  · Constipation.  · Nausea.  · Coughing or a sore throat.  · Changes in how your medicines work in your body.  · A depressed mood.  · Difficulty sleeping (insomnia).  After the first 2-3 weeks of quitting, you may start to notice more positive results, such as:  · Improved sense of smell and taste.  · Decreased coughing and sore throat.  · Slower heart rate.  · Lower blood pressure.  · Clearer skin.  · The ability to breathe more easily.  · Fewer sick days.  Quitting smoking is very challenging for most people. Do not get discouraged if you are not successful the first time. Some people need to make many attempts to quit before they achieve long-term success. Do your best to stick to your quit plan, and talk with your health care provider if you have any questions or concerns.  This information is not intended to replace advice given to you by your health care provider. Make sure you discuss any questions you have with your health care provider.  Document Released: 12/12/2002 Document Revised: 08/15/2017 Document Reviewed: 05/03/2016  [employer] Patient Education © 2017 [employer]

## 2020-11-06 NOTE — TELEPHONE ENCOUNTER
"Pt LVM stating that she's on Carvedilol and that Dr. Cervantes increased it w/o sending update to px. Stated she's out and needs RF.   #[phone]    Per chart review, OV note on 9/24/2020 states:  \"Current medications will be continued.\"   Rx in chart states:  carvedilol (COREG) 12.5 MG tablet [[id number]]     Order Details  Dose: 18.75 mg Route: Oral Frequency: 2 Times Daily With Meals  Dispense Quantity: -- Refills: -- Fills remaining: --   Sig: Take 18.75 mg by mouth 2 (Two) Times a Day With Meals.      Per further chart review, telephone encounter on 9/14/2020 states:  \"Discussed with JANNA David.  Recommends pt take carvediolol 12.5 mg 1 1/2 tablets BID.\"      Pended RF.     "

## 2020-11-08 RX ORDER — CARVEDILOL 12.5 MG/1
18.75 TABLET ORAL 2 TIMES DAILY WITH MEALS
Qty: 90 TABLET | Refills: 11 | Status: SHIPPED | OUTPATIENT
Start: 2020-11-08 | End: 2021-04-27 | Stop reason: SDUPTHER

## 2020-12-18 DIAGNOSIS — I25.10 CORONARY ARTERY DISEASE INVOLVING NATIVE CORONARY ARTERY OF NATIVE HEART WITHOUT ANGINA PECTORIS: Primary | ICD-10-CM

## 2021-02-02 RX ORDER — EVOLOCUMAB 420 MG/3.5
KIT SUBCUTANEOUS
Qty: 4 ML | Refills: 2 | Status: SHIPPED | OUTPATIENT
Start: 2021-02-02 | End: 2021-05-11

## 2021-02-03 ENCOUNTER — PRIOR AUTHORIZATION (OUTPATIENT)
Dept: CARDIOLOGY | Facility: CLINIC | Age: 61
End: 2021-02-03

## 2021-02-03 NOTE — TELEPHONE ENCOUNTER
[employer] PA Case: [id number]  Status: Approved  Coverage Starts on: 1/1/2021  Coverage Ends on: 12/31/2021  Dina Astorga MA

## 2021-03-16 RX ORDER — RANOLAZINE 1000 MG/1
1000 TABLET, EXTENDED RELEASE ORAL EVERY 12 HOURS SCHEDULED
Qty: 60 TABLET | Refills: 6 | Status: SHIPPED | OUTPATIENT
Start: 2021-03-16 | End: 2021-09-14 | Stop reason: SDUPTHER

## 2021-03-25 ENCOUNTER — OFFICE VISIT (OUTPATIENT)
Dept: CARDIOLOGY | Facility: CLINIC | Age: 61
End: 2021-03-25

## 2021-03-25 VITALS
OXYGEN SATURATION: 92 % | DIASTOLIC BLOOD PRESSURE: 61 MMHG | HEIGHT: 66 IN | WEIGHT: 189.8 LBS | SYSTOLIC BLOOD PRESSURE: 113 MMHG | BODY MASS INDEX: 30.5 KG/M2 | HEART RATE: 83 BPM

## 2021-03-25 DIAGNOSIS — I47.1 PAROXYSMAL SVT (SUPRAVENTRICULAR TACHYCARDIA) (HCC): ICD-10-CM

## 2021-03-25 DIAGNOSIS — R06.02 SHORTNESS OF BREATH: ICD-10-CM

## 2021-03-25 DIAGNOSIS — I25.10 CORONARY ARTERY DISEASE INVOLVING NATIVE CORONARY ARTERY OF NATIVE HEART WITHOUT ANGINA PECTORIS: Primary | ICD-10-CM

## 2021-03-25 DIAGNOSIS — R07.9 CHEST PAIN, UNSPECIFIED TYPE: ICD-10-CM

## 2021-03-25 DIAGNOSIS — I20.0 UNSTABLE ANGINA (HCC): ICD-10-CM

## 2021-03-25 PROCEDURE — 99214 OFFICE O/P EST MOD 30 MIN: CPT | Performed by: PHYSICIAN ASSISTANT

## 2021-03-25 NOTE — PROGRESS NOTES
Problem list     Subjective   Ira Stinson is a 61 y.o. female     Chief Complaint   Patient presents with   • Follow-up   PROBLEM LIST:      1. Coronary artery disease   1.1 History of CABG with follow-up stenting.  1.2 Recent catheterization, May 2014 with PTCA for IntraStent restenosis in the free radial to the RCA.  1.3. Repeat catheterization in July 2014 due to new 70-80% stenosis with angioplasty, subsequent dissection and stenting. Recommendations to consider single vessel bypass due to high grade stenosis  1.4 coronary artery bypass grafting with LIMA to LAD, vein graft to right coronary artery, vein graft to circumflex. Follow-up catheterization in 2014 demonstrated an occluded vein graft to RCA with stenting of the right coronary artery.  1.5 follow-up catheterization in November 2016 demonstrated high-grade disease involving the vein graft to the circumflex as well as the arterial graft to the PDA. There was high-grade disease of the native right coronary artery. Patient underwent stenting of the vein graft to circumflex, PTCA and stenting of the vein graft to the right coronary artery and stenting of the native right coronary artery.  1.6 cardiac catheterization October 2019 demonstrated high-grade RCA disease.  There was subtotal IntraStent restenosis as well as distal disease.  Patient underwent stenting x2.  Vein graft to circumflex was patent as well as LIMA.  Occluded native circumflex.  Medical management recommended  1.7 cardiac catheterization August 2020 demonstrated patent LIMA to LAD and patent vein graft to circumflex with significant native vessel disease.  Patient had occluded RCA at the acute margin.  Vein graft RCA was treated.  Because of multiple interventions in the RCA, medical management was recommended.  2. Peripheral vascular disease with history of right carotid endarterectomy and follow-up right endarterectomy, March 2011.  2.1 The patient has residual left carotid stenosis,  which was felt nonobstructive and not severe enough for mechanical intervention.  2.2 carotid duplex demonstrated 50 to 70% proximal right internal carotid artery stenosis.  2.3 carotid stenting by DR. Fatmata GRIDER  3. Chronic palpitations with previous monitoring indicating short-lived episodes of SVT and frequent PVCs.  4. Hypertension  5. Dyslipidemia  6. Hypothyroidism  7.  Mild aortic stenosis by echocardiogram 2016  7.1 mild aortic insufficiency by echocardiogram July 2019    HPI    Patient is a 61-year-old female that presents back to the office for follow-up.  As stated above, patient has significant coronary disease and has had multiple interventions of multiple coronary arteries and is even had bypass.  Patient unfortunately despite aggressive medical therapy including Repatha to help with lipid management, she would significantly stenosed in a few months.  Furthermore, she continued to smoke which exacerbated this but recently, thankfully, she has stopped smoking.  Last catheterization was in August in which she had a patent LIMA and occluded vein graft to the RCA.  Patent vein graft to the circumflex with 50 to 70% disease identified.  Because she has had multiple interventions of the right coronary artery, medical management was recommended to treat at that point    Patient describes to me, that in the past, when she did undergo stenting, previously of the RCA, she did much better.  She describes that her symptoms have worsened.  She has concerning angina.  She has been on multiple antianginal medicines and continues to have profound levels of dyspnea.  She does have underlying lung disease which contributes but she had difficulty walking in here and has developed pressure in her chest with neck and arm discomfort.  These are identical symptoms prior to obstructive disease that she has had in the past.  She is concerned, she wants something done because she is having profound symptoms.  She does not  describe PND orthopnea.    Furthermore, she has had palpitations.  She notices significant fluttering sensations and recently wore a Holter monitor through her primary showing premature beats but also several runs of SVT.  She has felt this fluttering significantly and has been concerned.  She does not describe any presyncope or syncope and otherwise currently stable not complaining of any discomfort      Current Outpatient Medications on File Prior to Visit   Medication Sig Dispense Refill   • ALPRAZolam (XANAX) 1 MG tablet Take 1 mg by mouth 2 (Two) Times a Day.  1   • budesonide (PULMICORT) 0.5 MG/2ML nebulizer solution U 2 ML VIA NEB BID     • budesonide-formoterol (SYMBICORT) 160-4.5 MCG/ACT inhaler Inhale 2 puffs 2 (Two) Times a Day.     • carvedilol (COREG) 12.5 MG tablet Take 1.5 tablets by mouth 2 (Two) Times a Day With Meals. 90 tablet 11   • clopidogrel (PLAVIX) 75 MG tablet Take 75 mg by mouth Daily.     • folic acid (FOLVITE) 1 MG tablet Take 1 mg by mouth Daily.     • isosorbide mononitrate (IMDUR) 60 MG 24 hr tablet Take 1 tablet by mouth Every Morning. 30 tablet 11   • levothyroxine (SYNTHROID, LEVOTHROID) 25 MCG tablet Take 25 mcg by mouth Daily.     • metFORMIN ER (GLUCOPHAGE-XR) 500 MG 24 hr tablet Take 500 mg by mouth Daily.  2   • montelukast (SINGULAIR) 10 MG tablet Take 1 tablet by mouth Every Night. 30 tablet 11   • nitroglycerin (NITROSTAT) 0.4 MG SL tablet Place 0.4 mg under the tongue Every 5 (Five) Minutes As Needed.     • pravastatin (PRAVACHOL) 40 MG tablet Take 1 tablet by mouth once daily 90 tablet 3   • ranolazine (RANEXA) 1000 MG 12 hr tablet Take 1 tablet by mouth Every 12 (Twelve) Hours. 60 tablet 6   • Repatha Pushtronex System solution cartridge INJECT 1 CARTRIDGE UNDER THE SKIN INTO THE APPRPRIATE AREA AS DIRECTED EVERY 30 DAYS 4 mL 2   • SPIRIVA RESPIMAT 2.5 MCG/ACT aerosol solution inhaler INHALE 2 SPRAY(S) BY MOUTH ONCE DAILY  3   • VENTOLIN  (90 Base) MCG/ACT inhaler  As Needed.     • vitamin C (ASCORBIC ACID) 500 MG tablet Take 500 mg by mouth Daily.     • VITAMIN D PO Take  by mouth.     • Zinc 50 MG capsule Take  by mouth.     • DULoxetine (CYMBALTA) 60 MG capsule Take 60 mg by mouth Daily.     • furosemide (LASIX) 20 MG tablet Take  by mouth As Needed. Taking M/W/F     • lansoprazole (PREVACID) 30 MG capsule Take 30 mg by mouth Daily.     • nicotine (NICODERM CQ) 21 MG/24HR patch Place 1 patch on the skin as directed by provider Daily.       No current facility-administered medications on file prior to visit.       Oxycodone hcl, Acetaminophen, Atorvastatin, Codeine, Hydrocodone bitartrate er, Lortab [hydrocodone-acetaminophen], Percocet [oxycodone-acetaminophen], Propoxyphene, Protonix [pantoprazole sodium], Contrast dye, and Penicillins    Past Medical History:   Diagnosis Date   • Anxiety    • Arthritis    • Autoimmune disorder (CMS/[facility])    • Carotid artery stenosis    • Chest pain    • COPD (chronic obstructive pulmonary disease) (CMS/[facility])    • Coronary artery disease     stents times 6   • Depression    • Diabetes (CMS/[facility])     type 2 dm, check blood sugar 2 to 3 times a week   • GERD (gastroesophageal reflux disease)    • Goiter    • Heart murmur    • Hiatal hernia    • History of transfusion    • Hyperlipidemia    • Hypertension    • Hypothyroidism    • Iron deficiency anemia    • Leaky heart valve    • Lichen planus    • Palpitations     with previous monitoring indicating short-lived episodes of SVT and frequent PVCs.   • Peripheral vascular disease (CMS/[facility])     with history of right carotid endarterectomy and follow-up right endarterectomy, March 2011.   • PONV (postoperative nausea and vomiting)     severe   • PVC (premature ventricular contraction)    • PVD (peripheral vascular disease) (CMS/HCC)    • Shortness of breath    • SVT (supraventricular tachycardia) (CMS/[facility])    • Wears dentures     upper   • Wears reading eyeglasses        Social History      "    Socioeconomic History   • Marital status:      Spouse name: Not on file   • Number of children: 2   • Years of education: Not on file   • Highest education level: Not on file   Tobacco Use   • Smoking status: Former Smoker     Packs/day: 0.25     Years: 45.00     Pack years: 11.25     Types: Cigarettes     Quit date: 10/25/2020     Years since quittin.4   • Smokeless tobacco: Never Used   Substance and Sexual Activity   • Alcohol use: No   • Drug use: No   • Sexual activity: Defer       Family History   Problem Relation Age of Onset   • Coronary artery disease Other    • Hyperlipidemia Other    • Hypertension Other    • Cancer Other    • Heart disease Mother    • Heart disease Father        Review of Systems   HENT: Negative.    Eyes: Positive for visual disturbance (glasses).   Respiratory: Positive for apnea (uses supplemental oxygen and bipap) and shortness of breath. Negative for chest tightness.    Cardiovascular: Positive for chest pain and palpitations (occas). Negative for leg swelling.        Pain in elbows, neck, and jawbone like she did before her first heart attack.    Gastrointestinal: Negative.    Endocrine: Negative.    Genitourinary: Negative.    Musculoskeletal: Positive for back pain. Negative for gait problem, neck pain and neck stiffness.   Skin: Negative.  Negative for rash and wound.   Allergic/Immunologic: Positive for environmental allergies (seasonal allergies). Negative for food allergies.   Neurological: Positive for dizziness (w/ quick movements), weakness (frequently) and numbness (hands). Negative for light-headedness and headaches.   Hematological: Bruises/bleeds easily.   Psychiatric/Behavioral: Negative.  Negative for sleep disturbance.       Objective   Vitals:    21 1034   BP: 113/61   BP Location: Left arm   Patient Position: Sitting   Pulse: 83   SpO2: 92%   Weight: 86.1 kg (189 lb 12.8 oz)   Height: 167.6 cm (65.98\")      /61 (BP Location: Left arm, " "Patient Position: Sitting)   Pulse 83   Ht 167.6 cm (65.98\")   Wt 86.1 kg (189 lb 12.8 oz)   SpO2 92%   BMI 30.65 kg/m²     Lab Results (most recent)     None          Physical Exam  Vitals and nursing note reviewed.   Constitutional:       General: She is not in acute distress.     Appearance: Normal appearance. She is well-developed.   HENT:      Head: Normocephalic and atraumatic.   Eyes:      General: No scleral icterus.        Right eye: No discharge.         Left eye: No discharge.      Conjunctiva/sclera: Conjunctivae normal.   Neck:      Vascular: Carotid bruit present.   Cardiovascular:      Rate and Rhythm: Normal rate and regular rhythm.      Heart sounds: Murmur heard.   Systolic murmur is present with a grade of 2/6.   No friction rub. No gallop.       Comments: Grade 2/6 systolic ejection murmur right upper sternal border.  Grade 1 systolic murmur left sternal border  Pulmonary:      Effort: Pulmonary effort is normal. No respiratory distress.      Breath sounds: Normal breath sounds. No wheezing or rales.   Chest:      Chest wall: No tenderness.   Musculoskeletal:      Right lower leg: No edema.      Left lower leg: No edema.   Skin:     General: Skin is warm and dry.      Coloration: Skin is not pale.      Findings: No erythema or rash.   Neurological:      Mental Status: She is alert and oriented to person, place, and time.      Cranial Nerves: No cranial nerve deficit.   Psychiatric:         Behavior: Behavior normal.         Procedure   Procedures       Assessment/Plan     Problems Addressed this Visit        Cardiac and Vasculature    CAD s/p multiple stents; s/p CABG x3 (2000) - Primary    Relevant Orders    Case Request Cath Lab: Coronary angiography    Ambulatory Referral to Cardiology    Chest pain    Relevant Orders    Case Request Cath Lab: Coronary angiography       Pulmonary and Pneumonias    Shortness of breath    Relevant Orders    Case Request Cath Lab: Coronary angiography      "   Other Visit Diagnoses     Paroxysmal SVT (supraventricular tachycardia) (CMS/[facility])        Relevant Orders    Case Request Cath Lab: Coronary angiography    Ambulatory Referral to Cardiac Electrophysiology    Unstable angina (CMS/[facility])        Relevant Orders    Case Request Cath Lab: Coronary angiography    Ambulatory Referral to Cardiology      Diagnoses       Codes Comments    Coronary artery disease involving native coronary artery of native heart without angina pectoris    -  Primary ICD-10-CM: I25.10  ICD-9-CM: 414.01     Shortness of breath     ICD-10-CM: R06.02  ICD-9-CM: 786.05     Chest pain, unspecified type     ICD-10-CM: R07.9  ICD-9-CM: 786.50     Paroxysmal SVT (supraventricular tachycardia) (CMS/[facility])     ICD-10-CM: I47.1  ICD-9-CM: 427.0     Unstable angina (CMS/[facility])     ICD-10-CM: I20.0  ICD-9-CM: 411.1           Recommendation  1.  Patient is a 61-year-old female with  atherosclerosis and vasculopathy that has underwent multiple coronary artery interventions with repeat interventions in the right coronary artery.  Most recent catheterization was in August in which there was a patent CAMPBELL to LAD, patent vein graft to circumflex with native vessel disease but unfortunately, her RCA seem to be occlusive near the acute margin.  Because of recurrent issues, it was not intervened on at that time as this has been a recurrent issue.  Unfortunately, her symptoms have progressed to the degree that she is having profound dyspnea and unstable angina.  She is on multiple antianginal therapies and currently is on Ranexa 1000 mg twice a day and is having significant discomfort.  Patient will need catheterization as she has known disease and recurrent restenosis.  Our hope is that she could have some type of revascularization performed as I do not feel any medical therapy is going to change her symptoms.  She, thankfully, has decided to quit smoking.  Our hope was that if she did undergo revascularization, this  would decrease the likelihood of her having recurrent stenosis.  She is on Repatha and now that she has quit smoking, hopefully an intervention can improve her symptoms and it be more long-term.    2.  She has SVT on recent monitor and currently is on beta-blocker therapy.  I would like to set her up with EP services at [facility] to see their recommendations.    3.  I want to see her back for follow-up after catheterization.  She is to follow-up with primary as scheduled.  She has nitroglycerin available for chest pain.  Any chest pain, not resolved with nitroglycerin, she is to go to the ER.           Ira Stinson  reports that she quit smoking about 4 months ago. Her smoking use included cigarettes. She has a 11.25 pack-year smoking history. She has never used smokeless tobacco.        Patient's Body mass index is 30.65 kg/m². BMI is above normal parameters. Recommendations include: educational material.       Electronically signed by:

## 2021-03-25 NOTE — PATIENT INSTRUCTIONS
How to Quarantine at Home  Information for Patients and Families    These instructions are for people with confirmed or suspected COVID-19 who do not need to be hospitalized and those with confirmed COVID-19 who were hospitalized and discharged to care for themselves at home.    If you were tested through the Health Department  The Health Department will monitor your wellbeing.  If it is determined that you do not need to be hospitalized and can be isolated at home, you will be monitored by staff from your local or state health department.     If you were tested through a Commercial Lab  You will need to monitor yourself and report changes in your symptoms to your doctor.  See the section below called Monitor Your Symptoms.    Follow these steps until a healthcare provider or local or state health department says you can return to your normal activities.    Stay home except to get medical care  • Restrict activities outside your home, except for getting medical care.   • Do not go to work, school, or public areas.   • Avoid using public transportation, ride-sharing, or taxis.    Separate yourself from other people and animals in your home  People  As much as possible, you should stay in a specific room and away from other people in your home. Also, you should use a separate bathroom, if available.    Animals  You should restrict contact with pets and other animals while you are sick with COVID-19, just like you would around other people. When possible, have another member of your household care for your animals while you are sick. If you are sick with COVID-19, avoid contact with your pet, including petting, snuggling, being kissed or licked, and sharing food. If you must care for your pet or be around animals while you are sick, wash your hands before and after you interact with pets and wear a facemask. See COVID-19 and Animals for more information.    Call ahead before visiting your doctor  If you have a medical  appointment, call the healthcare provider and tell them that you have or may have COVID-19. This information will help the healthcare provider’s office take steps to keep other people from getting infected or exposed.    Wear a facemask  You should wear a facemask when you are around other people (e.g., sharing a room or vehicle) or pets and before you enter a healthcare provider’s office.     If you are not able to wear a facemask (for example, because it causes trouble breathing), then people who live with you should not stay in the same room with you, or they should wear a facemask if they enter your room.    Cover your coughs and sneezes  • Cover your mouth and nose with a tissue when you cough or sneeze.   • Throw used tissues in a lined trash can.   • Immediately wash your hands with soap and water for at least 20 seconds or, if soap and water are not available, clean your hands with an alcohol-based hand  that contains at least 60% alcohol.    Clean your hands often  • Wash your hands often with soap and water for at least 20 seconds, especially after blowing your nose, coughing, or sneezing; going to the bathroom; and before eating or preparing food.     • If soap and water are not readily available, use an alcohol-based hand  with at least 60% alcohol, covering all surfaces of your hands and rubbing them together until they feel dry.    • Soap and water are the best option if hands are visibly dirty. Avoid touching your eyes, nose, and mouth with unwashed hands.    Avoid sharing personal household items  • You should not share dishes, drinking glasses, cups, eating utensils, towels, or bedding with other people or pets in your home.   • After using these items, they should be washed thoroughly with soap and water.    Clean all “high-touch” surfaces everyday  • High touch surfaces include counters, tabletops, doorknobs, bathroom fixtures, toilets, phones, keyboards, tablets, and bedside  tables.   • Also, clean any surfaces that may have blood, stool, or body fluids on them.   • Use a household cleaning spray or wipe, according to the label instructions. Labels contain instructions for safe and effective use of the cleaning product, including precautions you should take when applying the product, such as wearing gloves and making sure you have good ventilation during use of the product.    Monitor your symptoms  • Seek prompt medical attention if your illness is worsening (e.g., difficulty breathing).   • Before seeking care, call your healthcare provider and tell them that you have, or are being evaluated for, COVID-19.   • Put on a facemask before you enter the facility.     • These steps will help the healthcare provider’s office to keep other people in the office or waiting room from getting infected or exposed.   • Persons who are placed under active monitoring or facilitated self-monitoring should follow instructions provided by their local health department or occupational health professionals, as appropriate.  • If you have a medical emergency and need to call 911, notify the dispatch personnel that you have, or are being evaluated for COVID-19. If possible, put on a facemask before emergency medical services arrive.    Discontinuing home isolation  Patients with confirmed COVID-19 should remain under home isolation precautions until the risk of secondary transmission to others is thought to be low. The decision to discontinue home isolation precautions should be made on a case-by-case basis, in consultation with healthcare providers and state and local health departments.    The below content are for household members, intimate partners, and caregivers of a patient with symptomatic laboratory-confirmed COVID-19 or a patient under investigation:    Household members, intimate partners, and caregivers may have close contact with a person with symptomatic, laboratory-confirmed COVID-19 or a  person under investigation.     Close contacts should monitor their health; they should call their healthcare provider right away if they develop symptoms suggestive of COVID-19 (e.g., fever, cough, shortness of breath)     Close contacts should also follow these recommendations:  • Make sure that you understand and can help the patient follow their healthcare provider’s instructions for medication(s) and care. You should help the patient with basic needs in the home and provide support for getting groceries, prescriptions, and other personal needs.  • Monitor the patient’s symptoms. If the patient is getting sicker, call his or her healthcare provider and tell them that the patient has laboratory-confirmed COVID-19. This will help the healthcare provider’s office take steps to keep other people in the office or waiting room from getting infected. Ask the healthcare provider to call the local or formerly [facility] health department for additional guidance. If the patient has a medical emergency and you need to call 911, notify the dispatch personnel that the patient has, or is being evaluated for COVID-19.  • Household members should stay in another room or be  from the patient as much as possible. Household members should use a separate bedroom and bathroom, if available.  • Prohibit visitors who do not have an essential need to be in the home.  • Household members should care for any pets in the home. Do not handle pets or other animals while sick.  For more information, see COVID-19 and Animals.  • Make sure that shared spaces in the home have good air flow, such as by an air conditioner or an opened window, weather permitting.  • Perform hand hygiene frequently. Wash your hands often with soap and water for at least 20 seconds or use an alcohol-based hand  that contains 60 to 95% alcohol, covering all surfaces of your hands and rubbing them together until they feel dry. Soap and water should be used  preferentially if hands are visibly dirty.  • Avoid touching your eyes, nose, and mouth with unwashed hands.  • The patient should wear a facemask when you are around other people. If the patient is not able to wear a facemask (for example, because it causes trouble breathing), you, as the caregiver, should wear a mask when you are in the same room as the patient.  • Wear a disposable facemask and gloves when you touch or have contact with the patient’s blood, stool, or body fluids, such as saliva, sputum, nasal mucus, vomit, or urine.   o Throw out disposable facemasks and gloves after using them. Do not reuse.  o When removing personal protective equipment, first remove and dispose of gloves. Then, immediately clean your hands with soap and water or alcohol-based hand . Next, remove and dispose of facemask, and immediately clean your hands again with soap and water or alcohol-based hand .  • Avoid sharing household items with the patient. You should not share dishes, drinking glasses, cups, eating utensils, towels, bedding, or other items. After the patient uses these items, you should wash them thoroughly (see below “Wash laundry thoroughly”).  • Clean all “high-touch” surfaces, such as counters, tabletops, doorknobs, bathroom fixtures, toilets, phones, keyboards, tablets, and bedside tables, every day. Also, clean any surfaces that may have blood, stool, or body fluids on them.   o Use a household cleaning spray or wipe, according to the label instructions. Labels contain instructions for safe and effective use of the cleaning product including precautions you should take when applying the product, such as wearing gloves and making sure you have good ventilation during use of the product.  • Wash laundry thoroughly.   o Immediately remove and wash clothes or bedding that have blood, stool, or body fluids on them.  o Wear disposable gloves while handling soiled items and keep soiled items away  from your body. Clean your hands (with soap and water or an alcohol-based hand ) immediately after removing your gloves.  o Read and follow directions on labels of laundry or clothing items and detergent. In general, using a normal laundry detergent according to washing machine instructions and dry thoroughly using the warmest temperatures recommended on the clothing label.  • Place all used disposable gloves, facemasks, and other contaminated items in a lined container before disposing of them with other household waste. Clean your hands (with soap and water or an alcohol-based hand ) immediately after handling these items. Soap and water should be used preferentially if hands are visibly dirty.  • Discuss any additional questions with your state or local health department or healthcare provider.    Adapted from information provided by the Centers for Disease Control and Prevention.  For more information, visit https://www.cdc.gov/coronavirus/2019-ncov/hcp/guidance-prevent-spread.htmlHeart-Healthy Eating Plan  Heart-healthy meal planning includes:  · Eating less unhealthy fats.  · Eating more healthy fats.  · Making other changes in your diet.  Talk with your doctor or a diet specialist (dietitian) to create an eating plan that is right for you.  What is my plan?  Your doctor may recommend an eating plan that includes:  · Total fat: ______% or less of total calories a day.  · Saturated fat: ______% or less of total calories a day.  · Cholesterol: less than _________mg a day.  What are tips for following this plan?  Cooking  Avoid frying your food. Try to bake, boil, grill, or broil it instead. You can also reduce fat by:  · Removing the skin from poultry.  · Removing all visible fats from meats.  · Steaming vegetables in water or broth.  Meal planning    · At meals, divide your plate into four equal parts:  ? Fill one-half of your plate with vegetables and green salads.  ? Fill one-fourth of your  plate with whole grains.  ? Fill one-fourth of your plate with lean protein foods.  · Eat 4-5 servings of vegetables per day. A serving of vegetables is:  ? 1 cup of raw or cooked vegetables.  ? 2 cups of raw leafy greens.  · Eat 4-5 servings of fruit per day. A serving of fruit is:  ? 1 medium whole fruit.  ? ¼ cup of dried fruit.  ? ½ cup of fresh, frozen, or canned fruit.  ? ½ cup of 100% fruit juice.  · Eat more foods that have soluble fiber. These are apples, broccoli, carrots, beans, peas, and barley. Try to get 20-30 g of fiber per day.  · Eat 4-5 servings of nuts, legumes, and seeds per week:  ? 1 serving of dried beans or legumes equals ½ cup after being cooked.  ? 1 serving of nuts is ¼ cup.  ? 1 serving of seeds equals 1 tablespoon.  General information  · Eat more home-cooked food. Eat less restaurant, buffet, and fast food.  · Limit or avoid alcohol.  · Limit foods that are high in starch and sugar.  · Avoid fried foods.  · Lose weight if you are overweight.  · Keep track of how much salt (sodium) you eat. This is important if you have high blood pressure. Ask your doctor to tell you more about this.  · Try to add vegetarian meals each week.  Fats  · Choose healthy fats. These include olive oil and canola oil, flaxseeds, walnuts, almonds, and seeds.  · Eat more omega-3 fats. These include salmon, mackerel, sardines, tuna, flaxseed oil, and ground flaxseeds. Try to eat fish at least 2 times each week.  · Check food labels. Avoid foods with trans fats or high amounts of saturated fat.  · Limit saturated fats.  ? These are often found in animal products, such as meats, butter, and cream.  ? These are also found in plant foods, such as palm oil, palm kernel oil, and coconut oil.  · Avoid foods with partially hydrogenated oils in them. These have trans fats. Examples are stick margarine, some tub margarines, cookies, crackers, and other baked goods.  What foods can I eat?  Fruits  All fresh, canned (in  natural juice), or frozen fruits.  Vegetables  Fresh or frozen vegetables (raw, steamed, roasted, or grilled). Green salads.  Grains  Most grains. Choose whole wheat and whole grains most of the time. Rice and pasta, including brown rice and pastas made with whole wheat.  Meats and other proteins  Lean, well-trimmed beef, veal, pork, and lamb. Chicken and turkey without skin. All fish and shellfish. Wild duck, rabbit, pheasant, and venison. Egg whites or low-cholesterol egg substitutes. Dried beans, peas, lentils, and tofu. Seeds and most nuts.  Dairy  Low-fat or nonfat cheeses, including ricotta and mozzarella. Skim or 1% milk that is liquid, powdered, or evaporated. Buttermilk that is made with low-fat milk. Nonfat or low-fat yogurt.  Fats and oils  Non-hydrogenated (trans-free) margarines. Vegetable oils, including soybean, sesame, sunflower, olive, peanut, safflower, corn, canola, and cottonseed. Salad dressings or mayonnaise made with a vegetable oil.  Beverages  Mineral water. Coffee and tea. Diet carbonated beverages.  Sweets and desserts  Sherbet, gelatin, and fruit ice. Small amounts of dark chocolate.  Limit all sweets and desserts.  Seasonings and condiments  All seasonings and condiments.  The items listed above may not be a complete list of foods and drinks you can eat. Contact a dietitian for more options.  What foods should I avoid?  Fruits  Canned fruit in heavy syrup. Fruit in cream or butter sauce. Fried fruit. Limit coconut.  Vegetables  Vegetables cooked in cheese, cream, or butter sauce. Fried vegetables.  Grains  Breads that are made with saturated or trans fats, oils, or whole milk. Croissants. Sweet rolls. Donuts. High-fat crackers, such as cheese crackers.  Meats and other proteins  Fatty meats, such as hot dogs, ribs, sausage, clifford, rib-eye roast or steak. High-fat deli meats, such as salami and bologna. Caviar. Domestic duck and goose. Organ meats, such as liver.  Dairy  Cream, sour  cream, cream cheese, and creamed cottage cheese. Whole-milk cheeses. Whole or 2% milk that is liquid, evaporated, or condensed. Whole buttermilk. Cream sauce or high-fat cheese sauce. Yogurt that is made from whole milk.  Fats and oils  Meat fat, or shortening. Cocoa butter, hydrogenated oils, palm oil, coconut oil, palm kernel oil. Solid fats and shortenings, including clifford fat, salt pork, lard, and butter. Nondairy cream substitutes. Salad dressings with cheese or sour cream.  Beverages  Regular sodas and juice drinks with added sugar.  Sweets and desserts  Frosting. Pudding. Cookies. Cakes. Pies. Milk chocolate or white chocolate. Buttered syrups. Full-fat ice cream or ice cream drinks.  The items listed above may not be a complete list of foods and drinks to avoid. Contact a dietitian for more information.  Summary  · Heart-healthy meal planning includes eating less unhealthy fats, eating more healthy fats, and making other changes in your diet.  · Eat a balanced diet. This includes fruits and vegetables, low-fat or nonfat dairy, lean protein, nuts and legumes, whole grains, and heart-healthy oils and fats.  This information is not intended to replace advice given to you by your health care provider. Make sure you discuss any questions you have with your health care provider.  Document Revised: 02/21/2019 Document Reviewed: 01/25/2019  [employer] Patient Education © 2021 [employer]

## 2021-03-31 ENCOUNTER — TRANSCRIBE ORDERS (OUTPATIENT)
Dept: ADMINISTRATIVE | Facility: HOSPITAL | Age: 61
End: 2021-03-31

## 2021-03-31 DIAGNOSIS — I20.0 UNSTABLE ANGINA (HCC): Primary | ICD-10-CM

## 2021-04-08 ENCOUNTER — HOSPITAL ENCOUNTER (OUTPATIENT)
Facility: HOSPITAL | Age: 61
Setting detail: HOSPITAL OUTPATIENT SURGERY
Discharge: HOME | End: 2021-04-08
Attending: INTERNAL MEDICINE | Admitting: INTERNAL MEDICINE

## 2021-04-08 VITALS
SYSTOLIC BLOOD PRESSURE: 125 MMHG | WEIGHT: 189.6 LBS | TEMPERATURE: 97.2 F | HEART RATE: 86 BPM | DIASTOLIC BLOOD PRESSURE: 54 MMHG | OXYGEN SATURATION: 92 % | HEIGHT: 66 IN | RESPIRATION RATE: 18 BRPM | BODY MASS INDEX: 30.47 KG/M2

## 2021-04-08 DIAGNOSIS — I20.0 UNSTABLE ANGINA (HCC): ICD-10-CM

## 2021-04-08 LAB
ANION GAP SERPL CALCULATED.3IONS-SCNC: 10 MMOL/L (ref 5–15)
BUN SERPL-MCNC: 13 MG/DL (ref 8–23)
BUN/CREAT SERPL: 22.4 (ref 7–25)
CALCIUM SPEC-SCNC: 9.2 MG/DL (ref 8.6–10.5)
CHLORIDE SERPL-SCNC: 101 MMOL/L (ref 98–107)
CO2 SERPL-SCNC: 27 MMOL/L (ref 22–29)
CREAT SERPL-MCNC: 0.58 MG/DL (ref 0.57–1)
DEPRECATED RDW RBC AUTO: 46.7 FL (ref 37–54)
ERYTHROCYTE [DISTWIDTH] IN BLOOD BY AUTOMATED COUNT: 13.8 % (ref 12.3–15.4)
GFR SERPL CREATININE-BSD FRML MDRD: 105 ML/MIN/1.73
GLUCOSE BLDC GLUCOMTR-MCNC: 93 MG/DL (ref 70–130)
GLUCOSE SERPL-MCNC: 88 MG/DL (ref 65–99)
HCT VFR BLD AUTO: 35.8 % (ref 34–46.6)
HGB BLD-MCNC: 11.5 G/DL (ref 12–15.9)
MCH RBC QN AUTO: 30.1 PG (ref 26.6–33)
MCHC RBC AUTO-ENTMCNC: 32.1 G/DL (ref 31.5–35.7)
MCV RBC AUTO: 93.7 FL (ref 79–97)
PLATELET # BLD AUTO: 281 10*3/MM3 (ref 140–450)
PMV BLD AUTO: 9.7 FL (ref 6–12)
POTASSIUM SERPL-SCNC: 4.7 MMOL/L (ref 3.5–5.2)
RBC # BLD AUTO: 3.82 10*6/MM3 (ref 3.77–5.28)
SODIUM SERPL-SCNC: 138 MMOL/L (ref 136–145)
WBC # BLD AUTO: 6.8 10*3/MM3 (ref 3.4–10.8)

## 2021-04-08 PROCEDURE — 82962 GLUCOSE BLOOD TEST: CPT

## 2021-04-08 PROCEDURE — C1894 INTRO/SHEATH, NON-LASER: HCPCS | Performed by: INTERNAL MEDICINE

## 2021-04-08 PROCEDURE — 25010000002 FENTANYL CITRATE (PF) 100 MCG/2ML SOLUTION: Performed by: INTERNAL MEDICINE

## 2021-04-08 PROCEDURE — 93459 L HRT ART/GRFT ANGIO: CPT | Performed by: INTERNAL MEDICINE

## 2021-04-08 PROCEDURE — 63710000001 DIPHENHYDRAMINE PER 50 MG: Performed by: PHYSICIAN ASSISTANT

## 2021-04-08 PROCEDURE — 63710000001 PREDNISONE PER 1 MG: Performed by: PHYSICIAN ASSISTANT

## 2021-04-08 PROCEDURE — 0 IOPAMIDOL PER 1 ML: Performed by: INTERNAL MEDICINE

## 2021-04-08 PROCEDURE — C1769 GUIDE WIRE: HCPCS | Performed by: INTERNAL MEDICINE

## 2021-04-08 PROCEDURE — 82565 ASSAY OF CREATININE: CPT

## 2021-04-08 PROCEDURE — 25010000002 MIDAZOLAM PER 1 MG: Performed by: INTERNAL MEDICINE

## 2021-04-08 PROCEDURE — 80048 BASIC METABOLIC PNL TOTAL CA: CPT

## 2021-04-08 PROCEDURE — 85027 COMPLETE CBC AUTOMATED: CPT

## 2021-04-08 RX ORDER — LIDOCAINE HYDROCHLORIDE 10 MG/ML
INJECTION, SOLUTION EPIDURAL; INFILTRATION; INTRACAUDAL; PERINEURAL AS NEEDED
Status: DISCONTINUED | OUTPATIENT
Start: 2021-04-08 | End: 2021-04-08 | Stop reason: HOSPADM

## 2021-04-08 RX ORDER — DEXTROSE MONOHYDRATE 25 G/50ML
25 INJECTION, SOLUTION INTRAVENOUS
Status: DISCONTINUED | OUTPATIENT
Start: 2021-04-08 | End: 2021-04-08 | Stop reason: HOSPADM

## 2021-04-08 RX ORDER — SODIUM CHLORIDE 9 MG/ML
250 INJECTION, SOLUTION INTRAVENOUS CONTINUOUS
Status: ACTIVE | OUTPATIENT
Start: 2021-04-08 | End: 2021-04-08

## 2021-04-08 RX ORDER — ALPRAZOLAM 0.25 MG/1
0.25 TABLET ORAL 3 TIMES DAILY PRN
Status: DISCONTINUED | OUTPATIENT
Start: 2021-04-08 | End: 2021-04-08 | Stop reason: HOSPADM

## 2021-04-08 RX ORDER — ASPIRIN 325 MG
325 TABLET, DELAYED RELEASE (ENTERIC COATED) ORAL DAILY
Status: DISCONTINUED | OUTPATIENT
Start: 2021-04-08 | End: 2021-04-08 | Stop reason: HOSPADM

## 2021-04-08 RX ORDER — NICOTINE POLACRILEX 4 MG
15 LOZENGE BUCCAL
Status: DISCONTINUED | OUTPATIENT
Start: 2021-04-08 | End: 2021-04-08 | Stop reason: HOSPADM

## 2021-04-08 RX ORDER — IPRATROPIUM BROMIDE AND ALBUTEROL SULFATE 2.5; .5 MG/3ML; MG/3ML
3 SOLUTION RESPIRATORY (INHALATION) EVERY 4 HOURS PRN
COMMUNITY
End: 2022-04-26 | Stop reason: ALTCHOICE

## 2021-04-08 RX ORDER — MIDAZOLAM HYDROCHLORIDE 1 MG/ML
INJECTION INTRAMUSCULAR; INTRAVENOUS AS NEEDED
Status: DISCONTINUED | OUTPATIENT
Start: 2021-04-08 | End: 2021-04-08 | Stop reason: HOSPADM

## 2021-04-08 RX ORDER — TEMAZEPAM 7.5 MG/1
7.5 CAPSULE ORAL NIGHTLY PRN
Status: DISCONTINUED | OUTPATIENT
Start: 2021-04-08 | End: 2021-04-08 | Stop reason: HOSPADM

## 2021-04-08 RX ORDER — SODIUM CHLORIDE 9 MG/ML
250 INJECTION, SOLUTION INTRAVENOUS ONCE AS NEEDED
Status: DISCONTINUED | OUTPATIENT
Start: 2021-04-08 | End: 2021-04-08 | Stop reason: HOSPADM

## 2021-04-08 RX ORDER — FENTANYL CITRATE 50 UG/ML
INJECTION, SOLUTION INTRAMUSCULAR; INTRAVENOUS AS NEEDED
Status: DISCONTINUED | OUTPATIENT
Start: 2021-04-08 | End: 2021-04-08 | Stop reason: HOSPADM

## 2021-04-08 RX ORDER — PREDNISONE 20 MG/1
50 TABLET ORAL EVERY 6 HOURS
Status: DISCONTINUED | OUTPATIENT
Start: 2021-04-08 | End: 2021-04-08 | Stop reason: HOSPADM

## 2021-04-08 RX ORDER — NICOTINE 21 MG/24HR
1 PATCH, TRANSDERMAL 24 HOURS TRANSDERMAL EVERY 24 HOURS
COMMUNITY

## 2021-04-08 RX ORDER — DIPHENHYDRAMINE HCL 50 MG
50 CAPSULE ORAL ONCE
Status: COMPLETED | OUTPATIENT
Start: 2021-04-08 | End: 2021-04-08

## 2021-04-08 RX ADMIN — PREDNISONE 50 MG: 20 TABLET ORAL at 11:56

## 2021-04-08 RX ADMIN — ASPIRIN 325 MG: 325 TABLET, COATED ORAL at 11:56

## 2021-04-08 RX ADMIN — DIPHENHYDRAMINE HYDROCHLORIDE 50 MG: 50 CAPSULE ORAL at 11:56

## 2021-04-08 NOTE — H&P
Pre-Cardiac Catheterization Report  Cardiovascular Laboratory  [facility]      Patient:  Ira Stinson  :  1960  PCP:  Ernesto Whitfield MD  PHONE:  [phone]    DATE: 2021    BRIEF HPI:  Ira Stinson is a 61 y.o. female with hypertension, hypercholesterolemia, tobacco abuse-quit in 2020, diabetes mellitus, family history of coronary artery disease, carotid artery stenosis, and known coronary artery disease.  She is post previous multiple stents and underwent a three-vessel CABG approximately 20 years ago.  She is complaining of a several month history of chest pain which she describes as pressure.  She states it is moderate in severity lasting minutes with associated shortness of breath, dyspnea on exertion, and palpitations.  Her symptoms increased with exertion and are decreased with rest.  She underwent a heart catheterization in 2020 and apparently has disease of the right coronary artery.  She now presents for left heart catheterization with possible intervention.    Cardiac Risk Factors:  diabetes mellitus, dyslipidemia, family history of premature cardiovascular disease, hypertension, obesity (BMI >= 30 kg/m2), sedentary lifestyle, smoking/ tobacco exposure    Anginal class in last 2 weeks:  CCS class III    CHF Class in last 2 weeks:  NYHA Class II    Cardiogenic shock:  no    Cardiac arrest <24 hours:  no    Stress test within last 6 months:   no   Details:    Previous cardiac catheterization:  yes  Details:     Previous CABG:  yes  Details:      Allergies:     IV contrast allergy:  yes  Allergies   Allergen Reactions   • Oxycodone Hcl GI Intolerance and Confusion   • Acetaminophen Irritability     Patient states she can take Ibuprofen, Toradol, and Demerol for pain management   • Atorvastatin Myalgia   • Codeine GI Intolerance   • Hydrocodone Bitartrate Er Itching and GI Intolerance   • Lortab [Hydrocodone-Acetaminophen] Itching and GI Intolerance     TABS    • Percocet [Oxycodone-Acetaminophen] GI Intolerance   • Propoxyphene GI Intolerance   • Protonix [Pantoprazole Sodium] GI Intolerance   • Contrast Dye Hives and Other (See Comments)     IVP DYE. Blisters on skin    • Penicillins Rash       MEDICATIONS:  Prior to Admission medications    Medication Sig Start Date End Date Taking? Authorizing Provider   ALPRAZolam (XANAX) 1 MG tablet Take 1 mg by mouth 2 (Two) Times a Day. 10/16/19   Valery Elias MD   budesonide (PULMICORT) 0.5 MG/2ML nebulizer solution U 2 ML VIA NEB BID 12/17/19   Valery Elias MD   budesonide-formoterol (SYMBICORT) 160-4.5 MCG/ACT inhaler Inhale 2 puffs 2 (Two) Times a Day. 3/12/14   Valery Elias MD   carvedilol (COREG) 12.5 MG tablet Take 1.5 tablets by mouth 2 (Two) Times a Day With Meals. 11/8/20   Mckinley Beebe APRN   clopidogrel (PLAVIX) 75 MG tablet Take 75 mg by mouth Daily. 3/12/14   Valery Elias MD   DULoxetine (CYMBALTA) 60 MG capsule Take 60 mg by mouth Daily. 3/12/14   Valery Elias MD   folic acid (FOLVITE) 1 MG tablet Take 1 mg by mouth Daily.    Valery Elias MD   furosemide (LASIX) 20 MG tablet Take  by mouth As Needed. Taking M/W/F 5/27/15   Valery Elias MD   isosorbide mononitrate (IMDUR) 60 MG 24 hr tablet Take 1 tablet by mouth Every Morning. 8/6/20   Hardik Victor PA   lansoprazole (PREVACID) 30 MG capsule Take 30 mg by mouth Daily.    Valery Elias MD   levothyroxine (SYNTHROID, LEVOTHROID) 25 MCG tablet Take 25 mcg by mouth Daily. 3/12/14   Valery Elias MD   metFORMIN ER (GLUCOPHAGE-XR) 500 MG 24 hr tablet Take 500 mg by mouth Daily. 9/12/19   Valery Elias MD   montelukast (SINGULAIR) 10 MG tablet Take 1 tablet by mouth Every Night. 1/21/20   Hardik Victor PA   nicotine (NICODERM CQ) 21 MG/24HR patch Place 1 patch on the skin as directed by provider Daily.    Provider, MD Valery   nitroglycerin (NITROSTAT) 0.4 MG SL  tablet Place 0.4 mg under the tongue Every 5 (Five) Minutes As Needed. 5/27/15   Valery Elias MD   pravastatin (PRAVACHOL) 40 MG tablet Take 1 tablet by mouth once daily 8/18/20   Hardik Victor PA   ranolazine (RANEXA) 1000 MG 12 hr tablet Take 1 tablet by mouth Every 12 (Twelve) Hours. 3/16/21   Hardik Victor PA   Repatha Pushtronex System solution cartridge INJECT 1 CARTRIDGE UNDER THE SKIN INTO THE APPRPRIATE AREA AS DIRECTED EVERY 30 DAYS 2/2/21   Carlos Cervantes MD   SPIRIVA RESPIMAT 2.5 MCG/ACT aerosol solution inhaler INHALE 2 SPRAY(S) BY MOUTH ONCE DAILY 9/3/19   Valery Elias MD   VENTOLIN  (90 Base) MCG/ACT inhaler As Needed. 7/19/19   Valery Elias MD   vitamin C (ASCORBIC ACID) 500 MG tablet Take 500 mg by mouth Daily.    Valery Elias MD   VITAMIN D PO Take  by mouth.    Valery Elias MD   Zinc 50 MG capsule Take  by mouth.    Valery Elias MD       Past medical & surgical history, social and family history reviewed in the electronic medical record.    ROS:  Cardiovascular ROS: positive for - chest pain, dyspnea on exertion, palpitations and shortness of breath    Physical Exam:    Vitals: There were no vitals filed for this visit. There were no vitals filed for this visit.    General Appearance:    Alert, cooperative, in no acute distress   Head:    Normocephalic, without obvious abnormality, atraumatic   Eyes:            Lids and lashes normal, conjunctivae and sclerae normal, no   icterus, no pallor, corneas clear, PERRLA   Ears:    Ears appear intact with no abnormalities noted   Neck:   No adenopathy, supple, trachea midline, no thyromegaly, +   carotid bruits, no JVD   Back:     No kyphosis present, no scoliosis present, range of motion normal   Lungs:     Clear to auscultation,respirations regular, even and                unlabored    Heart:    Regular rhythm and normal rate, normal S1 and S2, no         murmur, no gallop, no  rub, no click   Chest Wall:    No abnormalities observed   Abdomen:     Normal bowel sounds, no masses, no organomegaly, soft     nontender, nondistended, no guarding, no rebound                tenderness   Rectal:     Deferred   Extremities:   Moves all extremities well, no edema, no cyanosis, no           redness   Pulses:   Pulses palpable and equal bilaterally   Skin:   No bleeding, bruising or rash   Neurologic:   Cranial nerves 2 - 12 grossly intact, sensation intact     Barbaeu Test:  Left: Used as bypass graft  (oxymetric Allens) Right: Normal             Lab Results   Component Value Date    AST 19 08/12/2020    ALT 15 08/12/2020           Impression      · Unstable angina    Plan     · Procedure to perform: [facility]  · Planned access: Per JANNA Fallon   04/08/21  11:28 EDT

## 2021-04-09 LAB — CREAT BLDA-MCNC: 0.4 MG/DL (ref 0.6–1.3)

## 2021-04-22 NOTE — PROGRESS NOTES
Cardiac Electrophysiology Outpatient Consult Note            [facility]    Consult Note     Ira Stinson  [id number]  04/26/2021  [phone]     Primary Care Physician: Ernesto Whitfield MD    Referred By: Hardik Victor PA    Subjective     Chief Complaint:   Diagnoses and all orders for this visit:    1. Atrial tachycardia (CMS/HCC) (Primary)    2. Palpitations    3. Shortness of breath    4. Essential hypertension    Other orders  -     ECG 12 Lead      Chief Complaint   Patient presents with   • PSVT     Problem List:      1. Palpitations  a. Long history of chronic palpitations with multiple cardiac monitors placed in the past revealing short runs of SVT felt to be atrial tachycardia and frequent PVCs  b. 48-hour cardiac monitor placed 4/15/2012 with predominant normal sinus rhythm HR 54-98, AVG 72 bpm with noted PACs and PVCs with one 7 beat run SVT at 135 bpm  c. 48-hour cardiac monitor placed 1/20/2021 for palpitations with predominant normal sinus rhythm HR , AVG 74 bpm with 2 brief runs of SVT felt to be atrial tachycardia and noted PACs/PVCs with 1% VE/SVE burden and referral to EP for further evaluation.  2. Coronary artery disease  a. Long history with multiple stents and CABG in the past  b. [facility] May 2014 with PTCA for IntraStent restenosis   c. Repeat catheterization in July 2014 due to new 70-80% stenosis with angioplasty, subsequent dissection and stenting. Recommendations to consider single vessel bypass due to high grade stenosis.  d. Coronary artery bypass grafting 2014  e. Follow-up catheterization in November 2016 demonstrated high-grade disease involving the vein graft to the circumflex as well as the arterial graft to the PDA. There was high-grade disease of the native right coronary artery. Patient underwent stenting of the vein graft to circumflex, PTCA and stenting of the vein graft to the right coronary artery and stenting of  the native right coronary artery.  f. [facility] October 2019 demonstrated high-grade RCA disease.  There was subtotal IntraStent restenosis as well as distal disease.  Patient underwent stenting x2.  Vein graft to circumflex was patent as well as LIMA.  Occluded native circumflex.  Medical management recommended.  g. Echo 8/19/2019 EF 61-65%, no significant VHD, LA 4.4 cm  h. [facility] August 2020 demonstrated patent LIMA to LAD and patent vein graft to circumflex with significant native vessel disease.  Patient had occluded RCA at the acute margin.  Vein graft RCA was treated.  Because of multiple interventions in the RCA, medical management was recommended.  i. [facility] 4/8/2021 per Dr. Milton with documented three-vessel CAD W/2 out of 3 patent bypass grafts,  RCA and SVC to RCA with good collateral flow, and small vessel disease of LAD diagonal secondary branch with medical therapy recommended.  3. Carotid artery disease  a. S/P left carotid endarterectomy and follow-up right endarterectomy, March 2011.  b. Documented residual nonobstructive left carotid stenosis.  c. Ccarotid duplex demonstrated 50 to 70% proximal right internal carotid artery stenosis  d. Carotid stenting per Dr. Avilez to right ICA  4. Essential hypertension  5. Dyslipidemia  6. Hypothyroidism  7. Supplemental oxygen therapy  a. Patient on continuous home oxygen since pneumonia March 2020 suspected to be Covid 5-day hospital stay.  8. AMBER      History of Present Illness:  Ms. Ira Stinson is a 61-year-old white female seen in EP consultation today at the request of JANNA Almanzar for further evaluation of her chronic palpitations with cardiac monitoring in the past revealing short runs of SVT felt to be atrial tachycardia.  Upon evaluation patient reports that her primary complaint is continued chronic stable fatigue, shortness of breath, and dyspnea on minimal exertion since her hospitalization in March 2020 for pneumonia.  She states that she  had a 5-day hospital stay at [facility] facility for viral pneumonia but never was tested for Covid.  She admits to having symptoms of loss of taste and smell for some time as well.  Patient with known history of coronary artery disease with recent cardiac catheterization revealing small vessel disease not amendable to revascularization and recommended continued medical therapy.  Patient concerned that her palpitations/arrhythmia could be attributing to her fatigue and shortness of breath.  Patient has 2 sustained tacky palpitations that occur daily lasting less than 1 minute in duration consistent with cardiac monitoring in the past revealing atrial tachycardia.  Patient denies dizziness, presyncope, or TIA/strokelike symptoms.  Patient admits to history of obstructive sleep apnea with CPAP compliance followed by Dr. Su in [facility].  Patient continues to follow-up with pulmonary due to continuous 2-3 L supplemental O2 therapy since her pneumonia 1 year ago.  Patient states that prior to her pneumonia she did not have any respiratory issues but was a smoker.  Patient noted with history of hypothyroidism with acceptable TSH 1.6 April 2021.    Review of Systems:   Constitutional: No fevers or chills, no recent weight gain or weight loss, + fatigue  Eyes: No visual loss, blurred vision, double vision, yellow sclerae.  ENT: No headaches, hearing loss, vertigo, congestion or sore throat.   Cardiovascular: Per HPI  Respiratory: No cough or wheezing, no sputum production, no hematemesis, + SOA, JANE  Gastrointestinal: No abdominal pain, no nausea, vomiting, constipation, diarrhea, melena.   Genitourinary: No dysuria, hematuria or increased frequency.  Musculoskeletal:  No gait disturbance, weakness or joint pain or stiffness  Integumentary: No rashes, urticaria, ulcers or sores.   Neurological: No headache, dizziness, syncope, paralysis, ataxia, no prior CVA/TIA  Psychiatric: No anxiety, or depression  Endocrine:  No diaphoresis, cold or heat intolerance. No polyuria or polydipsia.   Hematologic/Lymphatic: No anemia, abnormal bruising or bleeding. No history of DVT/PE.     Past Medical History:   Past Medical History:   Diagnosis Date   • Anxiety    • Arthritis    • Autoimmune disorder (CMS/HCC)    • Carotid artery stenosis    • Chest pain    • COPD (chronic obstructive pulmonary disease) (CMS/HCC)    • Coronary artery disease     stents times 6   • Depression    • Diabetes (CMS/HCC)     type 2 dm, check blood sugar 2 to 3 times a week   • GERD (gastroesophageal reflux disease)    • Goiter    • Heart murmur    • Hiatal hernia    • History of transfusion    • Hyperlipidemia    • Hypertension    • Hypothyroidism    • Iron deficiency anemia    • Leaky heart valve    • Lichen planus    • Palpitations     with previous monitoring indicating short-lived episodes of SVT and frequent PVCs.   • Peripheral vascular disease (CMS/HCC)     with history of right carotid endarterectomy and follow-up right endarterectomy, March 2011.   • PONV (postoperative nausea and vomiting)     severe   • PVC (premature ventricular contraction)    • PVD (peripheral vascular disease) (CMS/HCC)    • Shortness of breath    • SVT (supraventricular tachycardia) (CMS/HCC)    • Wears dentures     upper   • Wears reading eyeglasses        Past Surgical History:   Past Surgical History:   Procedure Laterality Date   • CARDIAC CATHETERIZATION  10/07/2019    patient reports having 6 or 7 heart caths with 6 stents total   • CARDIAC CATHETERIZATION N/A 4/8/2021    Procedure: Left Heart Cath 60848;  Surgeon: Mckinley Milton MD;  Location: [facility] CATH INVASIVE LOC[facility];  Service: Cardiovascular;  Laterality: N/A;   • CAROTID ENDARTERECTOMY Right 03/14/2011    Dr. Sharif   • CATARACT EXTRACTION, BILATERAL     • COLONOSCOPY      2016   • CORONARY ARTERY BYPASS GRAFT  2000    X3   • CORONARY STENT PLACEMENT     • HERNIA REPAIR      Umbilical Hernia Repair X2   • VT  TCAT IV STENT CRV CRTD ART EMBOLIC PROTECJ Right 2019    Procedure: Carotid Stent;  Surgeon: Qamar aNzario MD;  Location: [facility] INVASIVE LOCATION;  Service: Interventional Radiology   • TUBAL ABDOMINAL LIGATION         Family History:   Family History   Problem Relation Age of Onset   • Coronary artery disease Other    • Hyperlipidemia Other    • Hypertension Other    • Cancer Other    • Heart disease Mother    • Heart disease Father    • Heart attack Brother    • Cancer Sister        Social History:   Social History     Socioeconomic History   • Marital status:      Spouse name: Not on file   • Number of children: 2   • Years of education: Not on file   • Highest education level: Not on file   Tobacco Use   • Smoking status: Former Smoker     Packs/day: 0.25     Years: 45.00     Pack years: 11.25     Types: Cigarettes     Quit date: 10/25/2020     Years since quittin.5   • Smokeless tobacco: Never Used   Substance and Sexual Activity   • Alcohol use: No   • Drug use: No   • Sexual activity: Defer       Medications:     Current Outpatient Medications:   •  ALPRAZolam (XANAX) 1 MG tablet, Take 1 mg by mouth 2 (Two) Times a Day., Disp: , Rfl: 1  •  Ascorbic Acid (VITAMIN C PO), Take 1,000 mg by mouth Every Morning. CHEWABLES, Disp: , Rfl:   •  budesonide (PULMICORT) 0.5 MG/2ML nebulizer solution, Take 0.5 mg by nebulization As Needed., Disp: , Rfl:   •  budesonide-formoterol (SYMBICORT) 160-4.5 MCG/ACT inhaler, Inhale 2 puffs 2 (Two) Times a Day., Disp: , Rfl:   •  carvedilol (COREG) 12.5 MG tablet, Take 1.5 tablets by mouth 2 (Two) Times a Day With Meals., Disp: 90 tablet, Rfl: 11  •  cholecalciferol (VITAMIN D3) 10 MCG (400 UNIT) tablet, Take 400 Units by mouth Daily., Disp: , Rfl:   •  clopidogrel (PLAVIX) 75 MG tablet, Take 75 mg by mouth Daily., Disp: , Rfl:   •  DULoxetine (CYMBALTA) 60 MG capsule, Take 60 mg by mouth Daily., Disp: , Rfl:   •  folic acid (FOLVITE) 1 MG tablet, Take 1 mg  by mouth Daily., Disp: , Rfl:   •  furosemide (LASIX) 20 MG tablet, Take 20 mg by mouth As Needed. Taking M/W/F, Disp: , Rfl:   •  ipratropium-albuterol (DUO-NEB) 0.5-2.5 mg/3 ml nebulizer, Take 3 mL by nebulization Every 4 (Four) Hours As Needed for Wheezing., Disp: , Rfl:   •  isosorbide mononitrate (IMDUR) 60 MG 24 hr tablet, Take 1 tablet by mouth Every Morning., Disp: 30 tablet, Rfl: 11  •  lansoprazole (PREVACID) 30 MG capsule, Take 30 mg by mouth Daily., Disp: , Rfl:   •  levothyroxine (SYNTHROID, LEVOTHROID) 25 MCG tablet, Take 25 mcg by mouth Daily., Disp: , Rfl:   •  metFORMIN ER (GLUCOPHAGE-XR) 500 MG 24 hr tablet, Take 500 mg by mouth Daily., Disp: , Rfl: 2  •  montelukast (SINGULAIR) 10 MG tablet, Take 1 tablet by mouth Every Night., Disp: 30 tablet, Rfl: 11  •  nicotine (NICODERM CQ) 14 MG/24HR patch, Place 1 patch on the skin as directed by provider Daily., Disp: , Rfl:   •  nitroglycerin (NITROSTAT) 0.4 MG SL tablet, Place 0.4 mg under the tongue Every 5 (Five) Minutes As Needed., Disp: , Rfl:   •  O2 (OXYGEN), Inhale 3 L/min 1 (One) Time., Disp: , Rfl:   •  pravastatin (PRAVACHOL) 40 MG tablet, Take 40 mg by mouth Every Night., Disp: , Rfl:   •  ranolazine (RANEXA) 1000 MG 12 hr tablet, Take 1 tablet by mouth Every 12 (Twelve) Hours., Disp: 60 tablet, Rfl: 6  •  Repatha Pushtronex System solution cartridge, INJECT 1 CARTRIDGE UNDER THE SKIN INTO THE APPRPRIATE AREA AS DIRECTED EVERY 30 DAYS, Disp: 4 mL, Rfl: 2  •  SPIRIVA RESPIMAT 2.5 MCG/ACT aerosol solution inhaler, Inhale 2 puffs Daily., Disp: , Rfl: 3  •  VENTOLIN  (90 Base) MCG/ACT inhaler, Inhale 2 puffs Every 6 (Six) Hours As Needed., Disp: , Rfl:   •  Zinc 50 MG capsule, Take 50 mg by mouth Every Night., Disp: , Rfl:     Allergies:   Allergies   Allergen Reactions   • Oxycodone Hcl GI Intolerance and Confusion   • Acetaminophen Irritability     Patient states she can take Ibuprofen, Toradol, and Demerol for pain management   •  "Atorvastatin Myalgia   • Codeine GI Intolerance   • Hydrocodone Bitartrate Er Itching and GI Intolerance   • Lortab [Hydrocodone-Acetaminophen] Itching and GI Intolerance     TABS   • Percocet [Oxycodone-Acetaminophen] GI Intolerance   • Propoxyphene GI Intolerance   • Protonix [Pantoprazole Sodium] GI Intolerance   • Contrast Dye Hives and Other (See Comments)     IVP DYE. Blisters on skin    • Penicillins Rash       Objective   Vital Signs:   Vitals:    04/26/21 1108   BP: 130/74   BP Location: Right arm   Patient Position: Sitting   Cuff Size: Adult   Pulse: 73   SpO2: 91%  Comment: on O2   Weight: 84.8 kg (187 lb)   Height: 167.6 cm (66\")       PHYSICAL EXAM  General appearance: Awake, alert, cooperative  Head: Normocephalic, without obvious abnormality, atraumatic  Eyes: Conjunctivae/corneas clear, EOMs intact  Neck: no adenopathy, no carotid bruit, no JVD and thyroid: not enlarged  Lungs: clear to auscultation bilaterally and no rhonchi or crackles\", ' symmetric  Heart: regular rate and rhythm, S1, S2 normal, no murmur, click, rub or gallop  Abdomen: Soft, non-tender, bowel sounds normal,  no organomegaly  Extremities: extremities normal, atraumatic, no cyanosis or edema  Skin: Skin color, turgor normal, no rashes or lesions  Neurologic: Grossly normal     Lab Results   Component Value Date    GLUCOSE 88 04/08/2021    CALCIUM 9.2 04/08/2021     04/08/2021    K 4.7 04/08/2021    CO2 27.0 04/08/2021     04/08/2021    BUN 13 04/08/2021    CREATININE 0.40 (L) 04/08/2021    EGFRIFNONA 105 04/08/2021    BCR 22.4 04/08/2021    ANIONGAP 10.0 04/08/2021     Lab Results   Component Value Date    WBC 6.80 04/08/2021    HGB 11.5 (L) 04/08/2021    HCT 35.8 04/08/2021    MCV 93.7 04/08/2021     04/08/2021       Cardiac Testing:      I personally viewed and interpreted the patient's EKG/Telemetry/lab data      ECG 12 Lead    Date/Time: 4/26/2021 11:59 AM  Performed by: Francisco Weathers, " PARMINDER  Authorized by: Francisco Weathers APRN   Comparison: compared with previous ECG from 8/19/2019  Similar to previous ECG  Rhythm: sinus rhythm  BPM: 86              Assessment & Plan    Diagnoses and all orders for this visit:    1. Atrial tachycardia (CMS/HCC) (Primary)    2. Palpitations    3. Shortness of breath    4. Essential hypertension    Other orders  -     ECG 12 Lead         Diagnosis Plan   1. Atrial tachycardia (CMS/HCC)   totally asymptomatic short runs of atrial tachycardia.  She has no awareness of them at all.  She is profoundly short of breath the entire time and has been substance COVID-19 exposure.  The better question is why she hypoxemic.  In reviewing with her she has had no CT scans either hospitalization during the acute phase or in follow-up.    She is interested in obtaining second opinion from a pulmonary specialist.  We will honor her request and arrange for this.   2. Palpitations   occasional skipped beat sensation really nothing more than this.  Atrial tachycardia episodes are at most only mildly symptomatic and very brief.  She is not concerned about this in either mind.  No further work-up or testing for this is required.   3. Shortness of breath   all the time.  Associate with hypoxemia.  Now depends on oxygen.  This is all status post COVID-19 exposure.   4. Essential hypertension   blood pressure well controlled.  Continue present therapy including carvedilol.     Body mass index is 30.18 kg/m².    I spent 47 minutes in consultation with this patient which included more than 65% of this time in direct face-to-face counseling, physical examination and discussion of my assessment and findings and shared decision making with the patient.  The remainder of the time not spent face to face was performing one, some or all of the following actions:  preparing to see this patient ( eg. Review of tests),  ordering medications, tests or procedures ), care coordination, discussion  of the plan with other healthcare providers, documenting clinical information in Epic well as independently interpreting results and communicating results to patient, family and or caregiver.  All time noted occurred on the date of service.    Follow Up:       Scribed for Felipe Hudson DO by Francisco Weathers, PARMINDER . 4/26/2021  12:04 EDT      Thank you for allowing me to participate in the care of your patient. Please to not hesitate to contact me with additional questions or concerns.      Felipe Hudson DO, FAC, [facility]  Cardiac Electrophysiologist  [facility]

## 2021-04-26 ENCOUNTER — OFFICE VISIT (OUTPATIENT)
Dept: CARDIOLOGY | Facility: CLINIC | Age: 61
End: 2021-04-26

## 2021-04-26 VITALS
OXYGEN SATURATION: 91 % | HEART RATE: 73 BPM | HEIGHT: 66 IN | BODY MASS INDEX: 30.05 KG/M2 | DIASTOLIC BLOOD PRESSURE: 74 MMHG | SYSTOLIC BLOOD PRESSURE: 130 MMHG | WEIGHT: 187 LBS

## 2021-04-26 DIAGNOSIS — R06.02 SHORTNESS OF BREATH: Primary | ICD-10-CM

## 2021-04-26 DIAGNOSIS — R00.2 PALPITATIONS: ICD-10-CM

## 2021-04-26 DIAGNOSIS — I10 ESSENTIAL HYPERTENSION: ICD-10-CM

## 2021-04-26 DIAGNOSIS — I47.1 ATRIAL TACHYCARDIA (HCC): Primary | ICD-10-CM

## 2021-04-26 DIAGNOSIS — R06.02 SHORTNESS OF BREATH: ICD-10-CM

## 2021-04-26 PROCEDURE — 99204 OFFICE O/P NEW MOD 45 MIN: CPT | Performed by: INTERNAL MEDICINE

## 2021-04-26 PROCEDURE — 93000 ELECTROCARDIOGRAM COMPLETE: CPT | Performed by: INTERNAL MEDICINE

## 2021-04-26 RX ORDER — PRAVASTATIN SODIUM 40 MG
40 TABLET ORAL NIGHTLY
COMMUNITY
End: 2021-08-04

## 2021-04-27 ENCOUNTER — OFFICE VISIT (OUTPATIENT)
Dept: CARDIOLOGY | Facility: CLINIC | Age: 61
End: 2021-04-27

## 2021-04-27 VITALS
HEIGHT: 66 IN | WEIGHT: 187.8 LBS | DIASTOLIC BLOOD PRESSURE: 65 MMHG | OXYGEN SATURATION: 91 % | HEART RATE: 89 BPM | SYSTOLIC BLOOD PRESSURE: 125 MMHG | BODY MASS INDEX: 30.18 KG/M2

## 2021-04-27 DIAGNOSIS — I65.21 CAROTID STENOSIS, ASYMPTOMATIC, RIGHT: ICD-10-CM

## 2021-04-27 DIAGNOSIS — I49.3 PVC (PREMATURE VENTRICULAR CONTRACTION): ICD-10-CM

## 2021-04-27 DIAGNOSIS — R00.2 PALPITATIONS: ICD-10-CM

## 2021-04-27 DIAGNOSIS — Z72.0 TOBACCO USE: ICD-10-CM

## 2021-04-27 DIAGNOSIS — E78.00 PURE HYPERCHOLESTEROLEMIA: ICD-10-CM

## 2021-04-27 DIAGNOSIS — I25.10 CORONARY ARTERY DISEASE INVOLVING NATIVE CORONARY ARTERY OF NATIVE HEART WITHOUT ANGINA PECTORIS: Primary | ICD-10-CM

## 2021-04-27 DIAGNOSIS — I10 ESSENTIAL HYPERTENSION: ICD-10-CM

## 2021-04-27 DIAGNOSIS — R06.02 SHORTNESS OF BREATH: ICD-10-CM

## 2021-04-27 DIAGNOSIS — R07.9 CHEST PAIN, UNSPECIFIED TYPE: ICD-10-CM

## 2021-04-27 DIAGNOSIS — Z86.79 HISTORY OF CAROTID ARTERY DISEASE: ICD-10-CM

## 2021-04-27 PROCEDURE — 99214 OFFICE O/P EST MOD 30 MIN: CPT | Performed by: INTERNAL MEDICINE

## 2021-04-27 RX ORDER — OLMESARTAN MEDOXOMIL 20 MG/1
20 TABLET ORAL DAILY
Qty: 30 TABLET | Refills: 11 | Status: SHIPPED | OUTPATIENT
Start: 2021-04-27 | End: 2022-04-19

## 2021-04-27 RX ORDER — CARVEDILOL 12.5 MG/1
12.5 TABLET ORAL 2 TIMES DAILY WITH MEALS
Qty: 60 TABLET | Refills: 11 | Status: SHIPPED | OUTPATIENT
Start: 2021-04-27 | End: 2021-05-05 | Stop reason: SDUPTHER

## 2021-04-27 NOTE — PATIENT INSTRUCTIONS
Obesity, Adult  Obesity is the condition of having too much total body fat. Being overweight or obese means that your weight is greater than what is considered healthy for your body size. Obesity is determined by a measurement called BMI. BMI is an estimate of body fat and is calculated from height and weight. For adults, a BMI of 30 or higher is considered obese.  Obesity can lead to other health concerns and major illnesses, including:  · Stroke.  · Coronary artery disease (CAD).  · Type 2 diabetes.  · Some types of cancer, including cancers of the colon, breast, uterus, and gallbladder.  · Osteoarthritis.  · High blood pressure (hypertension).  · High cholesterol.  · Sleep apnea.  · Gallbladder stones.  · Infertility problems.  What are the causes?  Common causes of this condition include:  · Eating daily meals that are high in calories, sugar, and fat.  · Being born with genes that may make you more likely to become obese.  · Having a medical condition that causes obesity, including:  ? Hypothyroidism.  ? Polycystic ovarian syndrome (PCOS).  ? Binge-eating disorder.  ? Cushing syndrome.  · Taking certain medicines, such as steroids, antidepressants, and seizure medicines.  · Not being physically active (sedentary lifestyle).  · Not getting enough sleep.  · Drinking high amounts of sugar-sweetened beverages, such as soft drinks.  What increases the risk?  The following factors may make you more likely to develop this condition:  · Having a family history of obesity.  · Being a woman of  descent.  · Being a man of  descent.  · Living in an area with limited access to:  ? [place], recreation centers, or sidewalks.  ? Healthy food choices, such as grocery stores and farmers' markets.  What are the signs or symptoms?  The main sign of this condition is having too much body fat.  How is this diagnosed?  This condition is diagnosed based on:  · Your BMI. If you are an adult with a BMI of 30 or  higher, you are considered obese.  · Your waist circumference. This measures the distance around your waistline.  · Your skinfold thickness. Your health care provider may gently pinch a fold of your skin and measure it.  You may have other tests to check for underlying conditions.  How is this treated?  Treatment for this condition often includes changing your lifestyle. Treatment may include some or all of the following:  · Dietary changes. This may include developing a healthy meal plan.  · Regular physical activity. This may include activity that causes your heart to beat faster (aerobic exercise) and strength training. Work with your health care provider to design an exercise program that works for you.  · Medicine to help you lose weight if you are unable to lose 1 pound a week after 6 weeks of healthy eating and more physical activity.  · Treating conditions that cause the obesity (underlying conditions).  · Surgery. Surgical options may include gastric banding and gastric bypass. Surgery may be done if:  ? Other treatments have not helped to improve your condition.  ? You have a BMI of 40 or higher.  ? You have life-threatening health problems related to obesity.  Follow these instructions at home:  Eating and drinking    · Follow recommendations from your health care provider about what you eat and drink. Your health care provider may advise you to:  ? Limit fast food, sweets, and processed snack foods.  ? Choose low-fat options, such as low-fat milk instead of whole milk.  ? Eat 5 or more servings of fruits or vegetables every day.  ? Eat at home more often. This gives you more control over what you eat.  ? Choose healthy foods when you eat out.  ? Learn to read food labels. This will help you understand how much food is considered 1 serving.  ? Learn what a healthy serving size is.  ? Keep low-fat snacks available.  ? Limit sugary drinks, such as soda, fruit juice, sweetened iced tea, and flavored  milk.  · Drink enough water to keep your urine pale yellow.  · Do not follow a fad diet. Fad diets can be unhealthy and even dangerous.  Physical activity  · Exercise regularly, as told by your health care provider.  ? Most adults should get up to 150 minutes of moderate-intensity exercise every week.  ? Ask your health care provider what types of exercise are safe for you and how often you should exercise.  · Warm up and stretch before being active.  · Cool down and stretch after being active.  · Rest between periods of activity.  Lifestyle  · Work with your health care provider and a dietitian to set a weight-loss goal that is healthy and reasonable for you.  · Limit your screen time.  · Find ways to reward yourself that do not involve food.  · Do not drink alcohol if:  ? Your health care provider tells you not to drink.  ? You are pregnant, may be pregnant, or are planning to become pregnant.  · If you drink alcohol:  ? Limit how much you use to:  § 0-1 drink a day for women.  § 0-2 drinks a day for men.  ? Be aware of how much alcohol is in your drink. In the [place] one drink equals one 12 oz bottle of beer (355 mL), one 5 oz glass of wine (148 mL), or one 1½ oz glass of hard liquor (44 mL).  General instructions  · Keep a weight-loss journal to keep track of the food you eat and how much exercise you get.  · Take over-the-counter and prescription medicines only as told by your health care provider.  · Take vitamins and supplements only as told by your health care provider.  · Consider joining a support group. Your health care provider may be able to recommend a support group.  · Keep all follow-up visits as told by your health care provider. This is important.  Contact a health care provider if:  · You are unable to meet your weight loss goal after 6 weeks of dietary and lifestyle changes.  Get help right away if you are having:  · Trouble breathing.  · Suicidal thoughts or behaviors.  Summary  · Obesity is the  condition of having too much total body fat.  · Being overweight or obese means that your weight is greater than what is considered healthy for your body size.  · Work with your health care provider and a dietitian to set a weight-loss goal that is healthy and reasonable for you.  · Exercise regularly, as told by your health care provider. Ask your health care provider what types of exercise are safe for you and how often you should exercise.  This information is not intended to replace advice given to you by your health care provider. Make sure you discuss any questions you have with your health care provider.  Document Revised: 08/22/2019 Document Reviewed: 08/22/2019  [employer] Patient Education © 2021 [employer]  MyPlate from [employer]    MyPlate is an outline of a general healthy diet based on the 2010 Dietary Guidelines for Americans, from the [place] Department of Agriculture (USDA). It sets guidelines for how much food you should eat from each food group based on your age, sex, and level of physical activity.  What are tips for following MyPlate?  To follow MyPlate recommendations:  · Eat a wide variety of fruits and vegetables, grains, and protein foods.  · Serve smaller portions and eat less food throughout the day.  · Limit portion sizes to avoid overeating.  · Enjoy your food.  · Get at least 150 minutes of exercise every week. This is about 30 minutes each day, 5 or more days per week.  It can be difficult to have every meal look like MyPlate. Think about MyPlate as eating guidelines for an entire day, rather than each individual meal.  Fruits and vegetables  · Make half of your plate fruits and vegetables.  · Eat many different colors of fruits and vegetables each day.  · For a 2,000 calorie daily food plan, eat:  ? 2½ cups of vegetables every day.  ? 2 cups of fruit every day.  · 1 cup is equal to:  ? 1 cup raw or cooked vegetables.  ? 1 cup raw fruit.  ? 1 medium-sized orange, apple, or banana.  ? 1 cup  100% fruit or vegetable juice.  ? 2 cups raw leafy greens, such as lettuce, spinach, or kale.  ? ½ cup dried fruit.  Grains  · One fourth of your plate should be grains.  · Make at least half of the grains you eat each day whole grains.  · For a 2,000 calorie daily food plan, eat 6 oz of grains every day.  · 1 oz is equal to:  ? 1 slice bread.  ? 1 cup cereal.  ? ½ cup cooked rice, cereal, or pasta.  Protein  · One fourth of your plate should be protein.  · Eat a wide variety of protein foods, including meat, poultry, fish, eggs, beans, nuts, and tofu.  · For a 2,000 calorie daily food plan, eat 5½ oz of protein every day.  · 1 oz is equal to:  ? 1 oz meat, poultry, or fish.  ? ¼ cup cooked beans.  ? 1 egg.  ? ½ oz nuts or seeds.  ? 1 Tbsp peanut butter.  Dairy  · Drink fat-free or low-fat (1%) milk.  · Eat or drink dairy as a side to meals.  · For a 2,000 calorie daily food plan, eat or drink 3 cups of dairy every day.  · 1 cup is equal to:  ? 1 cup milk, yogurt, cottage cheese, or soy milk (soy beverage).  ? 2 oz processed cheese.  ? 1½ oz natural cheese.  Fats, oils, salt, and sugars  · Only small amounts of oils are recommended.  · Avoid foods that are high in calories and low in nutritional value (empty calories), like foods high in fat or added sugars.  · Choose foods that are low in salt (sodium). Choose foods that have less than 140 milligrams (mg) of sodium per serving.  · Drink water instead of sugary drinks. Drink enough water each day to keep your urine pale yellow.  Where to find support  · Work with your health care provider or a nutrition specialist (dietitian) to develop a customized eating plan that is right for you.  · Download an [employer]ina (mobile application) to help you track your daily food intake.  Where to find more information  · Go to ChooseMyPlate.gov for more information.  Summary  · MyPlate is a general guideline for healthy eating from the USDA. It is based on the 2010 Dietary Guidelines for  Americans.  · In general, fruits and vegetables should take up ½ of your plate, grains should take up ¼ of your plate, and protein should take up ¼ of your plate.  This information is not intended to replace advice given to you by your health care provider. Make sure you discuss any questions you have with your health care provider.  Document Revised: 05/21/2020 Document Reviewed: 03/19/2018  [employer] Patient Education © 2021 [employer]

## 2021-04-27 NOTE — PROGRESS NOTES
Sruthi Stinson is a 61 y.o. female     Chief Complaint   Patient presents with   • Follow-up     Here for [facility] f/u   • Coronary Artery Disease   • Hypertension   • Hyperlipidemia       PROBLEM LIST:     1. Palpitations  a. Long history of chronic palpitations with multiple cardiac monitors placed in the past revealing short runs of SVT felt to be atrial tachycardia and frequent PVCs  b. 48-hour cardiac monitor placed 4/15/2012 with predominant normal sinus rhythm HR 54-98, AVG 72 bpm with noted PACs and PVCs with one 7 beat run SVT at 135 bpm  c. 48-hour cardiac monitor placed 1/20/2021 for palpitations with predominant normal sinus rhythm HR , AVG 74 bpm with 2 brief runs of SVT felt to be atrial tachycardia and noted PACs/PVCs with 1% VE/SVE burden and referral to EP for further evaluation.  2. Coronary artery disease  a. Long history with multiple stents and CABG in the past  b. [facility] May 2014 with PTCA for IntraStent restenosis   c. Repeat catheterization in July 2014 due to new 70-80% stenosis with angioplasty, subsequent dissection and stenting. Recommendations to consider single vessel bypass due to high grade stenosis.  d. Coronary artery bypass grafting 2014  e. Follow-up catheterization in November 2016 demonstrated high-grade disease involving the vein graft to the circumflex as well as the arterial graft to the PDA. There was high-grade disease of the native right coronary artery. Patient underwent stenting of the vein graft to circumflex, PTCA and stenting of the vein graft to the right coronary artery and stenting of the native right coronary artery.  f. [facility] October 2019 demonstrated high-grade RCA disease.  There was subtotal IntraStent restenosis as well as distal disease.  Patient underwent stenting x2.  Vein graft to circumflex was patent as well as LIMA.  Occluded native circumflex.  Medical management recommended.  g. Echo 8/19/2019 EF 61-65%, no significant VHD, LA 4.4 cm  h. [facility]  August 2020 demonstrated patent LIMA to LAD and patent vein graft to circumflex with significant native vessel disease.  Patient had occluded RCA at the acute margin.  Vein graft RCA was treated.  Because of multiple interventions in the RCA, medical management was recommended.  i. LHC 4/8/2021 per Dr. Milton with documented three-vessel CAD W/2 out of 3 patent bypass grafts,  RCA and SVC to RCA with good collateral flow, and small vessel disease of LAD diagonal secondary branch with medical therapy recommended.  3. Carotid artery disease  a. S/P left carotid endarterectomy and follow-up right endarterectomy, March 2011.  b. Documented residual nonobstructive left carotid stenosis.  c. Ccarotid duplex demonstrated 50 to 70% proximal right internal carotid artery stenosis  d. Carotid stenting per Dr. Avilez to right ICA  4. Essential hypertension  5. Dyslipidemia  6. Hypothyroidism  7. Supplemental oxygen therapy  a. Patient on continuous home oxygen since pneumonia March 2020 suspected to be Covid 5-day hospital stay.  8. AMBER    Specialty Problems        Cardiology Problems    Atrial tachycardia (CMS/HCC)        CAD s/p multiple stents; s/p CABG x3 (2000)        Essential hypertension        Palpitations        PVC (premature ventricular contraction)        Stenosis of carotid artery        Bilateral carotid bruits        Pure hypercholesterolemia        Carotid stenosis s/p right carotid stent per Dr. Nazario 12/12/19        Unstable angina (CMS/HCC)                HPI:  Ms. Stinson returns for follow-up on the above.    She continues to have very infrequent episodes of angina which have not changed in frequency, intensity, duration, or threshold over the past several years.  She describes 1 episode of paroxysmal nocturnal dyspnea which occurred several months ago.  This was likely related to the patient's known lung disease as she used her rescue inhaler and had rapid relief of symptoms.  Ms. Stinson denies any  significant palpitations, she has mild orthostatic lightheadedness but no presyncope or syncope.  She has had no symptoms of TIA or stroke, and she denies symptoms of peripheral arterial disease.    Yesterday the patient saw Dr. Hudson with regard to ectopy as a possible contributing factor to the patient's dyspnea.  This was felt not to be the case and, in the absence of significant symptoms, no management change was indicated.  Ms. Stinson describes profound shortness of breath March 2020.  She was evaluated at [facility] and was told she had influenza B.  However, the patient confirms described in Dr. Hudson's note that she loss of sense of taste and smell and had likely had SARS-CoV-2 infection.  She has been O2 dependent since that time.                            PRIOR MEDICATIONS    Current Outpatient Medications on File Prior to Visit   Medication Sig Dispense Refill   • ALPRAZolam (XANAX) 1 MG tablet Take 1 mg by mouth 2 (Two) Times a Day.  1   • Ascorbic Acid (VITAMIN C PO) Take 1,000 mg by mouth Every Morning. CHEWABLES     • budesonide (PULMICORT) 0.5 MG/2ML nebulizer solution Take 0.5 mg by nebulization As Needed.     • budesonide-formoterol (SYMBICORT) 160-4.5 MCG/ACT inhaler Inhale 2 puffs 2 (Two) Times a Day.     • carvedilol (COREG) 12.5 MG tablet Take 1.5 tablets by mouth 2 (Two) Times a Day With Meals. 90 tablet 11   • cholecalciferol (VITAMIN D3) 10 MCG (400 UNIT) tablet Take 400 Units by mouth Daily.     • clopidogrel (PLAVIX) 75 MG tablet Take 75 mg by mouth Daily.     • DULoxetine (CYMBALTA) 60 MG capsule Take 60 mg by mouth Daily.     • folic acid (FOLVITE) 1 MG tablet Take 1 mg by mouth Daily.     • furosemide (LASIX) 20 MG tablet Take 20 mg by mouth As Needed. Taking M/W/F     • ipratropium-albuterol (DUO-NEB) 0.5-2.5 mg/3 ml nebulizer Take 3 mL by nebulization Every 4 (Four) Hours As Needed for Wheezing.     • isosorbide mononitrate (IMDUR) 60 MG 24 hr tablet Take 1  tablet by mouth Every Morning. 30 tablet 11   • lansoprazole (PREVACID) 30 MG capsule Take 30 mg by mouth Daily.     • levothyroxine (SYNTHROID, LEVOTHROID) 25 MCG tablet Take 25 mcg by mouth Daily.     • metFORMIN ER (GLUCOPHAGE-XR) 500 MG 24 hr tablet Take 500 mg by mouth Daily.  2   • montelukast (SINGULAIR) 10 MG tablet Take 1 tablet by mouth Every Night. 30 tablet 11   • nicotine (NICODERM CQ) 14 MG/24HR patch Place 1 patch on the skin as directed by provider Daily.     • nitroglycerin (NITROSTAT) 0.4 MG SL tablet Place 0.4 mg under the tongue Every 5 (Five) Minutes As Needed.     • O2 (OXYGEN) Inhale 3 L/min 1 (One) Time.     • pravastatin (PRAVACHOL) 40 MG tablet Take 40 mg by mouth Every Night.     • ranolazine (RANEXA) 1000 MG 12 hr tablet Take 1 tablet by mouth Every 12 (Twelve) Hours. 60 tablet 6   • Repatha Pushtronex System solution cartridge INJECT 1 CARTRIDGE UNDER THE SKIN INTO THE APPRPRIATE AREA AS DIRECTED EVERY 30 DAYS 4 mL 2   • SPIRIVA RESPIMAT 2.5 MCG/ACT aerosol solution inhaler Inhale 2 puffs Daily.  3   • VENTOLIN  (90 Base) MCG/ACT inhaler Inhale 2 puffs Every 6 (Six) Hours As Needed.     • Zinc 50 MG capsule Take 50 mg by mouth Every Night.       No current facility-administered medications on file prior to visit.       ALLERGIES:    Oxycodone hcl, Acetaminophen, Atorvastatin, Codeine, Hydrocodone bitartrate er, Lortab [hydrocodone-acetaminophen], Percocet [oxycodone-acetaminophen], Propoxyphene, Protonix [pantoprazole sodium], Contrast dye, and Penicillins    PAST MEDICAL HISTORY:    Past Medical History:   Diagnosis Date   • Anxiety    • Arthritis    • Autoimmune disorder (CMS/HCC)    • Carotid artery stenosis    • Chest pain    • COPD (chronic obstructive pulmonary disease) (CMS/HCC)    • Coronary artery disease     stents times 6   • Depression    • Diabetes (CMS/HCC)     type 2 dm, check blood sugar 2 to 3 times a week   • GERD (gastroesophageal reflux disease)    • Goiter     • Heart murmur    • Hiatal hernia    • History of transfusion    • Hyperlipidemia    • Hypertension    • Hypothyroidism    • Iron deficiency anemia    • Leaky heart valve    • Lichen planus    • Palpitations     with previous monitoring indicating short-lived episodes of SVT and frequent PVCs.   • Peripheral vascular disease (CMS/HCC)     with history of right carotid endarterectomy and follow-up right endarterectomy, March 2011.   • PONV (postoperative nausea and vomiting)     severe   • PVC (premature ventricular contraction)    • PVD (peripheral vascular disease) (CMS/HCC)    • Shortness of breath    • SVT (supraventricular tachycardia) (CMS/HCC)    • Wears dentures     upper   • Wears reading eyeglasses        SURGICAL HISTORY:    Past Surgical History:   Procedure Laterality Date   • CARDIAC CATHETERIZATION  10/07/2019    patient reports having 6 or 7 heart caths with 6 stents total   • CARDIAC CATHETERIZATION N/A 4/8/2021    Procedure: Left Heart Cath 48592;  Surgeon: Mckinley Milton MD;  Location:  [facility];  Service: Cardiovascular;  Laterality: N/A;   • CAROTID ENDARTERECTOMY Right 03/14/2011    Dr. Sharif   • CATARACT EXTRACTION, BILATERAL     • COLONOSCOPY      2016   • CORONARY ARTERY BYPASS GRAFT  2000    X3   • CORONARY STENT PLACEMENT     • HERNIA REPAIR      Umbilical Hernia Repair X2   • CO TCAT IV STENT CRV CRTD ART EMBOLIC PROTECJ Right 12/12/2019    Procedure: Carotid Stent;  Surgeon: Qamar Nazario MD;  Location:  [facility];  Service: Interventional Radiology   • TUBAL ABDOMINAL LIGATION         SOCIAL HISTORY:    Social History     Socioeconomic History   • Marital status:      Spouse name: Not on file   • Number of children: 2   • Years of education: Not on file   • Highest education level: Not on file   Tobacco Use   • Smoking status: Former Smoker     Packs/day: 0.25     Years: 45.00     Pack years: 11.25     Types: Cigarettes     Quit  "date: 10/25/2020     Years since quittin.5   • Smokeless tobacco: Never Used   Substance and Sexual Activity   • Alcohol use: No   • Drug use: No   • Sexual activity: Defer       FAMILY HISTORY:    Family History   Problem Relation Age of Onset   • Coronary artery disease Other    • Hyperlipidemia Other    • Hypertension Other    • Cancer Other    • Heart disease Mother    • Heart disease Father    • Heart attack Brother    • Cancer Sister        Review of Systems   Constitutional: Positive for fatigue.   HENT: Negative.    Eyes: Positive for visual disturbance (reading glasses).   Respiratory: Positive for shortness of breath (wears 02).    Cardiovascular: Positive for chest pain (usual), palpitations and leg swelling (mildly occas. ).   Gastrointestinal: Negative.    Endocrine: Negative.    Genitourinary: Negative.    Musculoskeletal: Positive for arthralgias and myalgias.   Skin: Negative.    Allergic/Immunologic: Positive for environmental allergies.   Neurological: Positive for light-headedness (with getting up quick).   Hematological: Bruises/bleeds easily.   Psychiatric/Behavioral: Negative.        VISIT VITALS:  Vitals:    21 1127   Pulse: 89   SpO2: 91%   Weight: 85.2 kg (187 lb 12.8 oz)   Height: 167.6 cm (65.98\")      Pulse 89   Ht 167.6 cm (65.98\")   Wt 85.2 kg (187 lb 12.8 oz)   SpO2 91% Comment: on  at 2-3 L via N/C  BMI 30.33 kg/m²     RECENT LABS:    Objective       Physical Exam  Vitals and nursing note reviewed.   Constitutional:       General: She is not in acute distress.     Appearance: She is well-developed.   HENT:      Head: Normocephalic and atraumatic.   Eyes:      Conjunctiva/sclera: Conjunctivae normal.      Pupils: Pupils are equal, round, and reactive to light.   Neck:      Vascular: Carotid bruit (bilat. ) present. No hepatojugular reflux or JVD.      Trachea: No tracheal deviation.      Comments: Nl. Carotid upstrokes  Cardiovascular:      Rate and Rhythm: Normal rate " and regular rhythm. Frequent extrasystoles are present.     Pulses:           Radial pulses are 2+ on the right side and 2+ on the left side.      Heart sounds: Murmur heard.   Systolic murmur is present.   Gallop present. S4 (soft) sounds present. No S3 sounds.       Comments: S1 decreased  2/6 systolic ejection murmur RUSB  2/6 TR  No MR    Pulmonary:      Effort: Pulmonary effort is normal.      Breath sounds: Decreased breath sounds (mildly decreased upper and mid fields) present. No wheezing, rhonchi or rales.      Comments: Nl. Expir. Phase  Nl. Breath sound intensity    Abdominal:      General: Bowel sounds are normal. There is no distension or abdominal bruit.      Palpations: Abdomen is soft. There is no mass.      Tenderness: There is no abdominal tenderness. There is no guarding or rebound.      Comments: No organomegaly   Musculoskeletal:         General: No tenderness or deformity. Normal range of motion.      Cervical back: Normal range of motion and neck supple.      Right lower leg: Edema present.      Left lower leg: No edema.      Comments: LLE, no edema, palpable pedal pulses  RLE, trace edema, palpable pedal pulses   Skin:     General: Skin is warm and dry.      Coloration: Skin is not pale.      Findings: No erythema or rash.   Neurological:      Mental Status: She is alert and oriented to person, place, and time.   Psychiatric:         Behavior: Behavior normal.         Thought Content: Thought content normal.         Judgment: Judgment normal.         Procedures      Assessment/Plan   #1.  Coronary artery disease.  The patient has stable infrequent angina and was felt not to have a substrate for mechanical revascularization at most recent cardiac catheterization.  Therefore, we will continue empiric therapy for ischemia as well as risk factor modification.    2.  Systemic hypertension.  Blood pressures are well controlled.  However, in the setting of diabetes, I think the patient would benefit  from ACE or ARB therapy.  Therefore we will decrease carvedilol to 12.5 mg twice daily and add olmesartan 20 mg daily with serial blood pressure checks.    3.  Peripheral arterial disease, currently asymptomatic.  Risk modification will be progressively pursued.    4.  History of palpitations with extrasystoles and SVT.  No change in therapy is indicated at this time.  See discussion above.    5.  We discussed today symptoms of myocardial and cerebral ischemia, heart failure, malignant dysrhythmia, and peripheral arterial disease.  Ms. Stinson will report any of these immediately.  Otherwise, she will follow with Dr. Whitfield as instructed and in our office in 1 year.   Diagnosis Plan   1. Coronary artery disease involving native coronary artery of native heart without angina pectoris     2. Carotid stenosis s/p right carotid stent per Dr. Nazario 12/12/19     3. Chest pain, unspecified type     4. Essential hypertension     5. Hx carotid artery disease s/p R CEAx2 (2011)     6. Palpitations     7. Pure hypercholesterolemia     8. PVC (premature ventricular contraction)     9. Shortness of breath     10. Ongoing tobacco use         No follow-ups on file.         Ira Stinson  reports that she quit smoking about 6 months ago. Her smoking use included cigarettes. She has a 11.25 pack-year smoking history. She has never used smokeless tobacco.. I have educated her on the risk of diseases from using tobacco products such as cancer, COPD and heart disease.               Patient's Body mass index is 30.33 kg/m². BMI is above normal parameters. Recommendations include: educational material and referral to primary care.       Fawn Owens LPN    Scribed for Dr. Carlos Cervantes by Fawn Owens LPN April 27, 2021 11:29 EDT         Electronically signed by:            This note is dictated utilizing voice recognition software.  Although this record has been proof read, transcriptional errors may still be present. If  questions occur regarding the content of this record please do not hesitate to call our office.

## 2021-05-04 DIAGNOSIS — I10 ESSENTIAL HYPERTENSION: ICD-10-CM

## 2021-05-04 NOTE — TELEPHONE ENCOUNTER
"Pt LVM stating Dr. Cervantes starting her on Olmesartan and that her BP is too low. Stated that her BP was 107/54 before meds, 81/42 after meds. Would like it adjusted.       Per chart review, Olmesartan 20mg daily was started on 4/27/21. OV note states:  \"Systemic hypertension.  Blood pressures are well controlled.  However, in the setting of diabetes, I think the patient would benefit from ACE or ARB therapy.  Therefore we will decrease carvedilol to 12.5 mg twice daily and add olmesartan 20 mg daily with serial blood pressure checks.\"   "

## 2021-05-05 RX ORDER — CARVEDILOL 6.25 MG/1
6.25 TABLET ORAL 2 TIMES DAILY WITH MEALS
Qty: 60 TABLET | Refills: 11 | Status: SHIPPED | OUTPATIENT
Start: 2021-05-05 | End: 2022-04-25

## 2021-05-05 NOTE — TELEPHONE ENCOUNTER
Arturo Bryson, Erna Reilly  Caller: Unspecified (Yesterday, 12:47 PM)  Decrease carvedilol further to 6.25 mg 1 p.o. twice daily.         Called pt and informed her of the above, she verbalized understandable.

## 2021-05-11 RX ORDER — EVOLOCUMAB 420 MG/3.5
KIT SUBCUTANEOUS
Qty: 3 CARTRIDGE | Refills: 3 | Status: SHIPPED | OUTPATIENT
Start: 2021-05-11 | End: 2021-06-29 | Stop reason: SDUPTHER

## 2021-06-29 RX ORDER — EVOLOCUMAB 420 MG/3.5
420 KIT SUBCUTANEOUS
Qty: 3 CARTRIDGE | Refills: 3 | Status: SHIPPED | OUTPATIENT
Start: 2021-06-29 | End: 2021-10-18

## 2021-07-06 RX ORDER — FUROSEMIDE 20 MG/1
20 TABLET ORAL AS NEEDED
Qty: 30 TABLET | Refills: 2 | Status: SHIPPED | OUTPATIENT
Start: 2021-07-06 | End: 2021-10-04

## 2021-07-12 RX ORDER — ISOSORBIDE MONONITRATE 60 MG/1
TABLET, EXTENDED RELEASE ORAL
Qty: 90 TABLET | Refills: 0 | Status: SHIPPED | OUTPATIENT
Start: 2021-07-12 | End: 2021-10-08

## 2021-07-15 RX ORDER — MONTELUKAST SODIUM 10 MG/1
TABLET ORAL
Qty: 90 TABLET | Refills: 0 | OUTPATIENT
Start: 2021-07-15

## 2021-08-02 RX ORDER — MONTELUKAST SODIUM 10 MG/1
TABLET ORAL
Qty: 90 TABLET | Refills: 0 | Status: SHIPPED | OUTPATIENT
Start: 2021-08-02 | End: 2021-11-17 | Stop reason: SDUPTHER

## 2021-08-03 DIAGNOSIS — E78.00 PURE HYPERCHOLESTEROLEMIA: ICD-10-CM

## 2021-08-03 DIAGNOSIS — I25.10 CORONARY ARTERY DISEASE INVOLVING NATIVE CORONARY ARTERY OF NATIVE HEART WITHOUT ANGINA PECTORIS: Primary | ICD-10-CM

## 2021-08-04 RX ORDER — PRAVASTATIN SODIUM 40 MG
TABLET ORAL
Qty: 90 TABLET | Refills: 1 | Status: SHIPPED | OUTPATIENT
Start: 2021-08-04 | End: 2022-02-07

## 2021-09-14 ENCOUNTER — TELEPHONE (OUTPATIENT)
Dept: CARDIOLOGY | Facility: CLINIC | Age: 61
End: 2021-09-14

## 2021-09-14 RX ORDER — RANOLAZINE 1000 MG/1
1000 TABLET, EXTENDED RELEASE ORAL EVERY 12 HOURS SCHEDULED
Qty: 60 TABLET | Refills: 6 | Status: SHIPPED | OUTPATIENT
Start: 2021-09-14 | End: 2022-03-14 | Stop reason: SDUPTHER

## 2021-09-14 NOTE — TELEPHONE ENCOUNTER
Pt LVM on triage line requesting refill on Ranexa 1000 mg.    Refill sent to px on file for Ranexa 1000 mg 1 po bid.

## 2021-10-04 RX ORDER — FUROSEMIDE 20 MG/1
TABLET ORAL
Qty: 90 TABLET | Refills: 0 | Status: SHIPPED | OUTPATIENT
Start: 2021-10-04

## 2021-10-08 RX ORDER — ISOSORBIDE MONONITRATE 60 MG/1
TABLET, EXTENDED RELEASE ORAL
Qty: 90 TABLET | Refills: 0 | Status: SHIPPED | OUTPATIENT
Start: 2021-10-08 | End: 2022-01-31

## 2021-10-18 RX ORDER — EVOLOCUMAB 420 MG/3.5
KIT SUBCUTANEOUS
Qty: 4 ML | Refills: 0 | Status: SHIPPED | OUTPATIENT
Start: 2021-10-18 | End: 2021-11-29

## 2021-11-17 RX ORDER — MONTELUKAST SODIUM 10 MG/1
10 TABLET ORAL NIGHTLY
Qty: 90 TABLET | Refills: 0 | Status: SHIPPED | OUTPATIENT
Start: 2021-11-17

## 2021-11-29 RX ORDER — EVOLOCUMAB 420 MG/3.5
KIT SUBCUTANEOUS
Qty: 4 ML | Refills: 0 | Status: SHIPPED | OUTPATIENT
Start: 2021-11-29 | End: 2021-12-09 | Stop reason: SDUPTHER

## 2021-12-09 RX ORDER — EVOLOCUMAB 420 MG/3.5
420 KIT SUBCUTANEOUS
Qty: 1 CARTRIDGE | Refills: 11 | Status: SHIPPED | OUTPATIENT
Start: 2021-12-09 | End: 2022-11-21

## 2021-12-21 ENCOUNTER — PRIOR AUTHORIZATION (OUTPATIENT)
Dept: CARDIOLOGY | Facility: CLINIC | Age: 61
End: 2021-12-21

## 2021-12-21 NOTE — TELEPHONE ENCOUNTER
Repatha PA Case: [id number]  Status: Approved  Coverage Starts on: 1/1/2021   Coverage Ends on: 12/31/2022  Dina Astorga MA    
Good

## 2022-01-31 RX ORDER — ISOSORBIDE MONONITRATE 60 MG/1
TABLET, EXTENDED RELEASE ORAL
Qty: 90 TABLET | Refills: 1 | Status: SHIPPED | OUTPATIENT
Start: 2022-01-31 | End: 2022-08-15

## 2022-02-06 DIAGNOSIS — E78.00 PURE HYPERCHOLESTEROLEMIA: ICD-10-CM

## 2022-02-06 DIAGNOSIS — I25.10 CORONARY ARTERY DISEASE INVOLVING NATIVE CORONARY ARTERY OF NATIVE HEART WITHOUT ANGINA PECTORIS: ICD-10-CM

## 2022-02-07 RX ORDER — MONTELUKAST SODIUM 10 MG/1
TABLET ORAL
Qty: 90 TABLET | Refills: 0 | OUTPATIENT
Start: 2022-02-07

## 2022-02-07 RX ORDER — PRAVASTATIN SODIUM 40 MG
TABLET ORAL
Qty: 90 TABLET | Refills: 1 | Status: SHIPPED | OUTPATIENT
Start: 2022-02-07 | End: 2022-07-28 | Stop reason: SDUPTHER

## 2022-02-10 NOTE — TELEPHONE ENCOUNTER
Tried to contact pt regarding medicine question left on triage VM- no answer, left message to call back.

## 2022-02-11 RX ORDER — MONTELUKAST SODIUM 10 MG/1
10 TABLET ORAL NIGHTLY
Refills: 0 | OUTPATIENT
Start: 2022-02-11

## 2022-02-11 NOTE — TELEPHONE ENCOUNTER
Patient called back stating she is needing a refill of singular, I advised Hardik BROWNING is not in office today  I would send him a message, she states she has enough for now and verbally understands.

## 2022-03-14 RX ORDER — RANOLAZINE 1000 MG/1
1000 TABLET, EXTENDED RELEASE ORAL EVERY 12 HOURS SCHEDULED
Qty: 180 TABLET | Refills: 3 | Status: SHIPPED | OUTPATIENT
Start: 2022-03-14 | End: 2023-02-21

## 2022-04-18 DIAGNOSIS — I10 ESSENTIAL HYPERTENSION: ICD-10-CM

## 2022-04-19 RX ORDER — OLMESARTAN MEDOXOMIL 20 MG/1
TABLET ORAL
Qty: 90 TABLET | Refills: 0 | Status: SHIPPED | OUTPATIENT
Start: 2022-04-19 | End: 2022-07-21 | Stop reason: SDUPTHER

## 2022-04-25 DIAGNOSIS — I10 ESSENTIAL HYPERTENSION: ICD-10-CM

## 2022-04-25 RX ORDER — CARVEDILOL 6.25 MG/1
TABLET ORAL
Qty: 180 TABLET | Refills: 1 | Status: SHIPPED | OUTPATIENT
Start: 2022-04-25 | End: 2022-10-26

## 2022-04-26 ENCOUNTER — OFFICE VISIT (OUTPATIENT)
Dept: CARDIOLOGY | Facility: CLINIC | Age: 62
End: 2022-04-26

## 2022-04-26 VITALS
HEIGHT: 66 IN | HEART RATE: 98 BPM | OXYGEN SATURATION: 89 % | DIASTOLIC BLOOD PRESSURE: 71 MMHG | BODY MASS INDEX: 31.08 KG/M2 | WEIGHT: 193.4 LBS | SYSTOLIC BLOOD PRESSURE: 125 MMHG

## 2022-04-26 DIAGNOSIS — E78.00 PURE HYPERCHOLESTEROLEMIA: ICD-10-CM

## 2022-04-26 DIAGNOSIS — R07.2 PRECORDIAL PAIN: ICD-10-CM

## 2022-04-26 DIAGNOSIS — Z72.0 TOBACCO USE: ICD-10-CM

## 2022-04-26 DIAGNOSIS — R06.02 SHORTNESS OF BREATH: ICD-10-CM

## 2022-04-26 DIAGNOSIS — I65.21 CAROTID STENOSIS, ASYMPTOMATIC, RIGHT: ICD-10-CM

## 2022-04-26 DIAGNOSIS — I25.10 CORONARY ARTERY DISEASE INVOLVING NATIVE CORONARY ARTERY OF NATIVE HEART WITHOUT ANGINA PECTORIS: Primary | ICD-10-CM

## 2022-04-26 DIAGNOSIS — R00.2 PALPITATIONS: ICD-10-CM

## 2022-04-26 DIAGNOSIS — R42 DIZZINESS: ICD-10-CM

## 2022-04-26 DIAGNOSIS — Z86.79 HISTORY OF CAROTID ARTERY DISEASE: ICD-10-CM

## 2022-04-26 DIAGNOSIS — I10 ESSENTIAL HYPERTENSION: ICD-10-CM

## 2022-04-26 PROCEDURE — 99214 OFFICE O/P EST MOD 30 MIN: CPT | Performed by: INTERNAL MEDICINE

## 2022-04-26 PROCEDURE — 93000 ELECTROCARDIOGRAM COMPLETE: CPT | Performed by: INTERNAL MEDICINE

## 2022-04-26 RX ORDER — BUDESONIDE, GLYCOPYRROLATE, AND FORMOTEROL FUMARATE 160; 9; 4.8 UG/1; UG/1; UG/1
2 AEROSOL, METERED RESPIRATORY (INHALATION) 2 TIMES DAILY
COMMUNITY
End: 2022-05-16

## 2022-04-26 RX ORDER — ALBUTEROL SULFATE 2.5 MG/3ML
2.5 SOLUTION RESPIRATORY (INHALATION) EVERY 4 HOURS PRN
COMMUNITY

## 2022-04-26 RX ORDER — ASPIRIN 81 MG/1
81 TABLET ORAL DAILY
Qty: 90 TABLET
Start: 2022-04-26 | End: 2022-05-09

## 2022-04-26 RX ORDER — VITAMIN E 268 MG
CAPSULE ORAL DAILY
COMMUNITY

## 2022-04-26 NOTE — PROGRESS NOTES
Subjective   Ira Stinson is a 62 y.o. female     Chief Complaint   Patient presents with   • Follow-up     Here for yearly f/u   • Coronary Artery Disease   • Hypertension   • Palpitations   • Hyperlipidemia   • Carotid Artery Disease   • Shortness of Breath       PROBLEM LIST:     1. Palpitations  a. Long history of chronic palpitations with multiple cardiac monitors placed in the past revealing short runs of SVT felt to be atrial tachycardia and frequent PVCs  b. 48-hour cardiac monitor placed 4/15/2012 with predominant normal sinus rhythm HR 54-98, AVG 72 bpm with noted PACs and PVCs with one 7 beat run SVT at 135 bpm  c. 48-hour cardiac monitor placed 1/20/2021 for palpitations with predominant normal sinus rhythm HR , AVG 74 bpm with 2 brief runs of SVT felt to be atrial tachycardia and noted PACs/PVCs with 1% VE/SVE burden and referral to EP for further evaluation.  2. Coronary artery disease  a. Long history with multiple stents and CABG in the past  b. [facility] May 2014 with PTCA for IntraStent restenosis   c. Repeat catheterization in July 2014 due to new 70-80% stenosis with angioplasty, subsequent dissection and stenting. Recommendations to consider single vessel bypass due to high grade stenosis.  d. Coronary artery bypass grafting 2014  e. Follow-up catheterization in November 2016 demonstrated high-grade disease involving the vein graft to the circumflex as well as the arterial graft to the PDA. There was high-grade disease of the native right coronary artery. Patient underwent stenting of the vein graft to circumflex, PTCA and stenting of the vein graft to the right coronary artery and stenting of the native right coronary artery.  f. [facility] October 2019 demonstrated high-grade RCA disease.  There was subtotal IntraStent restenosis as well as distal disease.  Patient underwent stenting x2.  Vein graft to circumflex was patent as well as LIMA.  Occluded native circumflex.  Medical management  recommended.  g. Echo 8/19/2019 EF 61-65%, no significant VHD, LA 4.4 cm  h. [facility] August 2020 demonstrated patent LIMA to LAD and patent vein graft to circumflex with significant native vessel disease.  Patient had occluded RCA at the acute margin.  Vein graft RCA was treated.  Because of multiple interventions in the RCA, medical management was recommended.  i. [facility] 4/8/2021 per Dr. Milton with documented three-vessel CAD W/2 out of 3 patent bypass grafts,  RCA and SVC to RCA with good collateral flow, and small vessel disease of LAD diagonal secondary branch with medical therapy recommended.  3. Carotid artery disease  a. S/P left carotid endarterectomy and follow-up right endarterectomy, March 2011.  b. Documented residual nonobstructive left carotid stenosis.  c. Ccarotid duplex demonstrated 50 to 70% proximal right internal carotid artery stenosis  d. Carotid stenting per Dr. Avilez to right ICA  4. Essential hypertension  5. Dyslipidemia  6. Hypothyroidism  7. Supplemental oxygen therapy  a. Patient on continuous home oxygen since pneumonia March 2020 suspected to be Covid 5-day hospital stay.  8. AMBER      Specialty Problems        Cardiology Problems    Atrial tachycardia (HCC)        CAD s/p multiple stents; s/p CABG x3 (2000)        Essential hypertension        Palpitations        PVC (premature ventricular contraction)        Stenosis of carotid artery        Bilateral carotid bruits        Pure hypercholesterolemia        Carotid stenosis s/p right carotid stent per Dr. Nazario 12/12/19        Unstable angina (HCC)                HPI:    Ms. Stinson returns for follow-up on the above.  Several months ago she developed recurrent angina.  She describes a sharp retrosternal and left precordial pain which is not accompanied by nausea, diaphoresis, or shortness of breath and which usually occurs when she is lying in bed at night.  I do not believe that that discomfort is anginal in nature.  However, she also  describes bilateral arm and bilateral neck and throat pain which comes on with exertion and goes away with rest similar to symptoms she had previously prior to many of her multiple previous revascularization procedures.  Ms. Stinson is also noticed a significant decline in her functional capacity over the past several months.    The patient denies orthopnea or PND and has some minimal lower extremity edema which may have progressed to a's very small degree since the time of her last visit.  She senses palpitations.  Ms. Stinson describes both sensed extrasystoles which she has had for years but she also now experiences a rapid forceful a fluttering in her chest which sometimes causes very minimal shortness of breath and dizziness.  The patient also describes symptoms most compatible with benign positional vertigo, and she also describes mild orthostatic lightheadedness.    Ms. Stinson describes no claudication or other symptoms of peripheral arterial disease and she has no symptoms of arterial embolic events to include no symptoms of TIA or stroke.                    PRIOR MEDICATIONS    Current Outpatient Medications on File Prior to Visit   Medication Sig Dispense Refill   • albuterol (PROVENTIL) (2.5 MG/3ML) 0.083% nebulizer solution Take 2.5 mg by nebulization Every 4 (Four) Hours As Needed for Wheezing.     • ALPRAZolam (XANAX) 1 MG tablet Take 1 mg by mouth 2 (Two) Times a Day.  1   • Ascorbic Acid (VITAMIN C PO) Take 1,000 mg by mouth Every Morning. CHEWABLES     • Budeson-Glycopyrrol-Formoterol (Breztri Aerosphere) 160-9-4.8 MCG/ACT aerosol inhaler Inhale 2 puffs 2 (Two) Times a Day.     • budesonide (PULMICORT) 0.5 MG/2ML nebulizer solution Take 0.5 mg by nebulization As Needed.     • carvedilol (COREG) 6.25 MG tablet TAKE 1 TABLET BY MOUTH TWICE DAILY WITH MEALS 180 tablet 1   • clopidogrel (PLAVIX) 75 MG tablet Take 75 mg by mouth Daily.     • DULoxetine (CYMBALTA) 60 MG capsule Take 60 mg by mouth Daily.      • Evolocumab with Infusor (Repatha Pushtronex System) solution cartridge Inject 3.5 mL under the skin into the appropriate area as directed Every 30 (Thirty) Days. 1 cartridge. 11   • folic acid (FOLVITE) 1 MG tablet Take 1 mg by mouth Daily.     • furosemide (LASIX) 20 MG tablet TAKE 1 TABLET BY MOUTH ONCE DAILY AS NEEDED (EDEMA) 90 tablet 0   • isosorbide mononitrate (IMDUR) 60 MG 24 hr tablet TAKE 1 TABLET BY MOUTH ONCE DAILY IN THE MORNING 90 tablet 1   • lansoprazole (PREVACID) 30 MG capsule Take 30 mg by mouth Daily.     • levothyroxine (SYNTHROID, LEVOTHROID) 25 MCG tablet Take 25 mcg by mouth Daily.     • metFORMIN ER (GLUCOPHAGE-XR) 500 MG 24 hr tablet Take 500 mg by mouth Daily.  2   • montelukast (SINGULAIR) 10 MG tablet Take 1 tablet by mouth Every Night. 90 tablet 0   • nicotine (NICODERM CQ) 14 MG/24HR patch Place 1 patch on the skin as directed by provider Daily. Uses occas.     • O2 (OXYGEN) Inhale 3 L/min 1 (One) Time.     • olmesartan (BENICAR) 20 MG tablet Take 1 tablet by mouth once daily 90 tablet 0   • pravastatin (PRAVACHOL) 40 MG tablet Take 1 tablet by mouth once daily 90 tablet 1   • ranolazine (RANEXA) 1000 MG 12 hr tablet Take 1 tablet by mouth Every 12 (Twelve) Hours. 180 tablet 3   • VENTOLIN  (90 Base) MCG/ACT inhaler Inhale 2 puffs Every 6 (Six) Hours As Needed.     • vitamin D3 125 MCG (5000 UT) capsule capsule Take 5,000 Units by mouth Daily.     • Vitamin E 180 MG (400 UNIT) capsule capsule Take  by mouth Daily.     • Zinc 50 MG capsule Take 50 mg by mouth Every Night.     • nitroglycerin (NITROSTAT) 0.4 MG SL tablet Place 0.4 mg under the tongue Every 5 (Five) Minutes As Needed.     • [DISCONTINUED] budesonide-formoterol (SYMBICORT) 160-4.5 MCG/ACT inhaler Inhale 2 puffs 2 (Two) Times a Day.     • [DISCONTINUED] ipratropium-albuterol (DUO-NEB) 0.5-2.5 mg/3 ml nebulizer Take 3 mL by nebulization Every 4 (Four) Hours As Needed for Wheezing.     • [DISCONTINUED] SPIRIVA  RESPIMAT 2.5 MCG/ACT aerosol solution inhaler Inhale 2 puffs Daily.  3     No current facility-administered medications on file prior to visit.       ALLERGIES:    Oxycodone hcl, Acetaminophen, Atorvastatin, Codeine, Hydrocodone bitartrate er, Lortab [hydrocodone-acetaminophen], Percocet [oxycodone-acetaminophen], Propoxyphene, Protonix [pantoprazole sodium], Bactrim [sulfamethoxazole-trimethoprim], Clindamycin/lincomycin, Contrast dye, and Penicillins    PAST MEDICAL HISTORY:    Past Medical History:   Diagnosis Date   • Anxiety    • Arthritis    • Autoimmune disorder ([facility])    • Carotid artery stenosis    • Chest pain    • COPD (chronic obstructive pulmonary disease) ([facility])    • Coronary artery disease     stents times 6   • Depression    • Diabetes ([facility])     type 2 dm, check blood sugar 2 to 3 times a week   • GERD (gastroesophageal reflux disease)    • Goiter    • Heart murmur    • Hiatal hernia    • History of transfusion    • Hyperlipidemia    • Hypertension    • Hypothyroidism    • Iron deficiency anemia    • Leaky heart valve    • Lichen planus    • Palpitations     with previous monitoring indicating short-lived episodes of SVT and frequent PVCs.   • Peripheral vascular disease ([facility])     with history of right carotid endarterectomy and follow-up right endarterectomy, March 2011.   • PONV (postoperative nausea and vomiting)     severe   • PVC (premature ventricular contraction)    • PVD (peripheral vascular disease) ([facility])    • Shortness of breath    • SVT (supraventricular tachycardia) ([facility])    • Wears dentures     upper   • Wears reading eyeglasses        SURGICAL HISTORY:    Past Surgical History:   Procedure Laterality Date   • CARDIAC CATHETERIZATION  10/07/2019    patient reports having 6 or 7 heart caths with 6 stents total   • CARDIAC CATHETERIZATION N/A 4/8/2021    Procedure: Left Heart Cath 84596;  Surgeon: Mckinley Milton MD;  Location: [facility] CATH INVASIVE LOCATION;  Service: Cardiovascular;   Laterality: N/A;   • CAROTID ENDARTERECTOMY Right 2011    Dr. Sharif   • CATARACT EXTRACTION, BILATERAL     • COLONOSCOPY      2016   • CORONARY ARTERY BYPASS GRAFT  2000    X3   • CORONARY STENT PLACEMENT     • HERNIA REPAIR      Umbilical Hernia Repair X2   • OK TCAT IV STENT CRV CRTD ART EMBOLIC PROTECJ Right 2019    Procedure: Carotid Stent;  Surgeon: Qamar Nazario MD;  Location: [facility] INVASIVE LOCATION;  Service: Interventional Radiology   • TUBAL ABDOMINAL LIGATION         SOCIAL HISTORY:    Social History     Socioeconomic History   • Marital status:    • Number of children: 2   Tobacco Use   • Smoking status: Former Smoker     Packs/day: 0.25     Years: 45.00     Pack years: 11.25     Types: Cigarettes     Quit date: 10/25/2020     Years since quittin.5   • Smokeless tobacco: Never Used   Substance and Sexual Activity   • Alcohol use: No   • Drug use: No   • Sexual activity: Defer       FAMILY HISTORY:    Family History   Problem Relation Age of Onset   • Coronary artery disease Other    • Hyperlipidemia Other    • Hypertension Other    • Cancer Other    • Heart disease Mother    • Heart disease Father    • Heart attack Brother    • Cancer Sister        Review of Systems   Constitutional:        Decreased strength and stamina compared to a year ago r/t chronic breathing issues   HENT: Negative.    Eyes: Positive for visual disturbance (glasseds prn).   Respiratory: Positive for shortness of breath (on 02).    Cardiovascular: Positive for chest pain, palpitations and leg swelling (occas. ).   Gastrointestinal: Negative.    Endocrine: Negative.    Genitourinary: Negative.    Musculoskeletal: Positive for arthralgias and myalgias.   Skin: Negative.    Allergic/Immunologic: Positive for environmental allergies.   Neurological: Positive for dizziness (with getting up quickly). Negative for syncope.   Hematological: Bruises/bleeds easily.   Psychiatric/Behavioral: Negative.   "      VISIT VITALS:  Vitals:    04/26/22 1128   BP: 125/71   BP Location: Left arm   Patient Position: Sitting   Pulse: 98   SpO2: (!) 89%   Weight: 87.7 kg (193 lb 6.4 oz)   Height: 167.6 cm (65.98\")      /71 (BP Location: Left arm, Patient Position: Sitting)   Pulse 98   Ht 167.6 cm (65.98\")   Wt 87.7 kg (193 lb 6.4 oz)   SpO2 (!) 89% Comment: on 02 at 2-3 L via n/c  BMI 31.23 kg/m²     RECENT LABS:    Objective       Physical Exam  Vitals and nursing note reviewed.   Constitutional:       General: She is not in acute distress.     Appearance: She is well-developed.   HENT:      Head: Normocephalic and atraumatic.   Eyes:      Conjunctiva/sclera: Conjunctivae normal.      Pupils: Pupils are equal, round, and reactive to light.   Neck:      Vascular: Carotid bruit (Bilat. bruits) present. No hepatojugular reflux or JVD.      Trachea: No tracheal deviation.      Comments: Nl. Carotid upstrokes  Cardiovascular:      Rate and Rhythm: Normal rate and regular rhythm.      Pulses:           Radial pulses are 2+ on the right side and 2+ on the left side.      Heart sounds: Murmur heard.    Systolic murmur is present.    No friction rub.      Comments: S1 decreased  2/6 systolic ejection murmur RUSB / LUSB  1/6 MR  1-2/6 TR  Pulmonary:      Effort: Pulmonary effort is normal.      Breath sounds: Normal breath sounds. No wheezing, rhonchi or rales.      Comments: Mildly increased Expir. Phase  Moderately decreased sound intensity  Abdominal:      General: Bowel sounds are normal. There is no distension or abdominal bruit.      Palpations: Abdomen is soft. There is no mass.      Tenderness: There is no abdominal tenderness. There is no guarding or rebound.      Comments: No organomegaly   Musculoskeletal:         General: No tenderness or deformity. Normal range of motion.      Cervical back: Normal range of motion and neck supple.      Right lower leg: No edema.      Left lower leg: Edema present.      Comments: " LLE, trace edema, excellent pedal pulses  RLE, no edema, palpable pedal pulses   Skin:     General: Skin is warm and dry.      Coloration: Skin is not pale.      Findings: No erythema or rash.   Neurological:      Mental Status: She is alert and oriented to person, place, and time.   Psychiatric:         Behavior: Behavior normal.         Thought Content: Thought content normal.         Judgment: Judgment normal.           ECG 12 Lead    Date/Time: 4/26/2022 11:52 AM  Performed by: Carlos Cervantes MD  Authorized by: Carlos Cervantes MD   Comparison: compared with previous ECG from 4/26/2021  Comments: Sinus rhythm at 92 bpm.  Nonspecific IVCD, nonspecific ST segment T wave changes, possible septal wall MI age undetermined.  No significant change from prior.              Assessment/Plan   #1.  Recurrent angina patient with known coronary artery disease and multiple revascularization procedures in the past.  Ms. Stinson will restart aspirin 81 mg daily and we will schedule for pharmacologic stress testing on an expedited basis.  She understands to activate emergency medical services for any chest discomfort not rapidly relieved by nitroglycerin.    2.  Valvular heart disease with murmurs as described above.  I would like to perform echocardiography to reassess valve anatomy and physiology.    3.  Palpitations.  With new symptoms of sustained rapid heartbeat we will perform 30-day event monitoring to exclude paroxysmal atrial fibrillation and to further evaluate dysrhythmic substrate.    4.  Treated and controlled systemic hypertension.    5.  Treated and controlled dyslipidemia.    6.  We will plan on seeing Ms. Stinson in follow-up after testing or on apparent basis for symptoms as discussed, and she will follow with Dr. Whitfield as instructed.   Diagnosis Plan   1. Coronary artery disease involving native coronary artery of native heart without angina pectoris     2. Carotid stenosis s/p right carotid stent per  Dr. Nazario 12/12/19     3. Precordial pain     4. Essential hypertension     5. Hx carotid artery disease s/p R CEAx2 (2011)     6. Palpitations     7. Pure hypercholesterolemia     8. Shortness of breath     9. Ongoing tobacco use         No follow-ups on file.                 BMI is >= 30 and <= 34.9 (Class 1 obesity). The following options were offered after discussion: pcp addressing           Electronically signed by:    Scribed for Carlos Cervantes MD by Fawn Owens LPN on April 26, 2022  at 11:38 EDT    I, Carlos Cervantes MD personally performed the services described in this documentation as scribed by the above named individual in my presence, and it is both accurate and complete. April 26, 2022 11:38 EDT      This note is dictated utilizing voice recognition software.  Although this record has been proof read, transcriptional errors may still be present. If questions occur regarding the content of this record please do not hesitate to call our office.

## 2022-05-05 ENCOUNTER — TELEPHONE (OUTPATIENT)
Dept: CARDIOLOGY | Facility: CLINIC | Age: 62
End: 2022-05-05

## 2022-05-05 NOTE — TELEPHONE ENCOUNTER
[facility] called with Critical EKG report- Was printed and given to Dr. Cervantes's nurse Sofia MATHEWS MA

## 2022-05-09 ENCOUNTER — HOSPITAL ENCOUNTER (OUTPATIENT)
Dept: CARDIOLOGY | Facility: HOSPITAL | Age: 62
Discharge: HOME | End: 2022-05-09

## 2022-05-09 ENCOUNTER — TELEPHONE (OUTPATIENT)
Dept: CARDIOLOGY | Facility: CLINIC | Age: 62
End: 2022-05-09

## 2022-05-09 DIAGNOSIS — Z72.0 TOBACCO USE: ICD-10-CM

## 2022-05-09 DIAGNOSIS — R06.02 SHORTNESS OF BREATH: ICD-10-CM

## 2022-05-09 DIAGNOSIS — I10 ESSENTIAL HYPERTENSION: ICD-10-CM

## 2022-05-09 DIAGNOSIS — I25.10 CORONARY ARTERY DISEASE INVOLVING NATIVE CORONARY ARTERY OF NATIVE HEART WITHOUT ANGINA PECTORIS: ICD-10-CM

## 2022-05-09 DIAGNOSIS — R00.2 PALPITATIONS: ICD-10-CM

## 2022-05-09 DIAGNOSIS — R07.2 PRECORDIAL PAIN: ICD-10-CM

## 2022-05-09 DIAGNOSIS — E78.00 PURE HYPERCHOLESTEROLEMIA: ICD-10-CM

## 2022-05-09 DIAGNOSIS — I48.0 PAROXYSMAL ATRIAL FIBRILLATION: Primary | ICD-10-CM

## 2022-05-09 PROCEDURE — 93018 CV STRESS TEST I&R ONLY: CPT | Performed by: INTERNAL MEDICINE

## 2022-05-09 PROCEDURE — 78452 HT MUSCLE IMAGE SPECT MULT: CPT

## 2022-05-09 PROCEDURE — 25010000002 REGADENOSON 0.4 MG/5ML SOLUTION

## 2022-05-09 PROCEDURE — 93017 CV STRESS TEST TRACING ONLY: CPT

## 2022-05-09 PROCEDURE — 93306 TTE W/DOPPLER COMPLETE: CPT | Performed by: INTERNAL MEDICINE

## 2022-05-09 PROCEDURE — 0 TECHNETIUM SESTAMIBI: Performed by: INTERNAL MEDICINE

## 2022-05-09 PROCEDURE — 78452 HT MUSCLE IMAGE SPECT MULT: CPT | Performed by: INTERNAL MEDICINE

## 2022-05-09 PROCEDURE — 93306 TTE W/DOPPLER COMPLETE: CPT

## 2022-05-09 PROCEDURE — A9500 TC99M SESTAMIBI: HCPCS | Performed by: INTERNAL MEDICINE

## 2022-05-09 RX ADMIN — TECHNETIUM TC 99M SESTAMIBI 1 DOSE: 1 INJECTION INTRAVENOUS at 11:47

## 2022-05-09 NOTE — TELEPHONE ENCOUNTER
CRITICAL EVENT  MONITOR REPORTS FROM 5-8-2022 AT 7:52 AM AND 5-8-2022 AT 8:38 AM INDICATING A-FIB WHICH WOULD BE NEW ONSET FOR PATIENT. ABOVE REVIEWED BY DR. SYED AND V/O RECEIVED TO SEND IN ELIQUIS 500 MG PO BID. PATIENT HAD STRESS TEST DOWNSTAIRS TODAY. DISCUSSED ABOVE WITH HER, COPIES GIVEN TO HER OF EVENTS PER HER REQUEST, X 2 SAMPLE BOTTLES OF ELIQUIS 5 MG GIVEN ALONG WITH 10.00 COPAY AND 30 FREE TRIAL. SCRIPT ALSO SENT IN TO [facility] HERE IN [place]. T/O PER DR. SYED TO STOP ASA AND STAY ON PLAVIX. PATIENT AWARE. PH,LPN

## 2022-05-10 LAB
BH CV REST NUCLEAR ISOTOPE DOSE: 10 MCI
BH CV STRESS COMMENTS STAGE 1: NORMAL
BH CV STRESS DOSE REGADENOSON STAGE 1: 0.4
BH CV STRESS DURATION MIN STAGE 1: 0
BH CV STRESS DURATION SEC STAGE 1: 10
BH CV STRESS NUCLEAR ISOTOPE DOSE: 30 MCI
BH CV STRESS PROTOCOL 1: NORMAL
BH CV STRESS RECOVERY BP: NORMAL MMHG
BH CV STRESS RECOVERY HR: 92 BPM
BH CV STRESS STAGE 1: 1
MAXIMAL PREDICTED HEART RATE: 156 BPM
PERCENT MAX PREDICTED HR: 60.26 %
STRESS BASELINE BP: NORMAL MMHG
STRESS BASELINE HR: 83 BPM
STRESS PERCENT HR: 71 %
STRESS POST PEAK BP: NORMAL MMHG
STRESS POST PEAK HR: 94 BPM
STRESS TARGET HR: 133 BPM

## 2022-05-12 ENCOUNTER — TELEPHONE (OUTPATIENT)
Dept: CARDIOLOGY | Facility: CLINIC | Age: 62
End: 2022-05-12

## 2022-05-13 ENCOUNTER — TELEPHONE (OUTPATIENT)
Dept: CARDIOLOGY | Facility: CLINIC | Age: 62
End: 2022-05-13

## 2022-05-13 NOTE — TELEPHONE ENCOUNTER
CRITICAL MONITOR RESULTS FROM [facility] WERE RECEIVED YESTERDAY, PER HAL DEL CASTILLO MA SHE HAD GIVEN THEM TO  NURSE,ISIS MATHEWS MA

## 2022-05-16 ENCOUNTER — OFFICE VISIT (OUTPATIENT)
Dept: CARDIOLOGY | Facility: CLINIC | Age: 62
End: 2022-05-16

## 2022-05-16 VITALS
SYSTOLIC BLOOD PRESSURE: 151 MMHG | OXYGEN SATURATION: 93 % | HEIGHT: 66 IN | HEART RATE: 106 BPM | BODY MASS INDEX: 31.02 KG/M2 | DIASTOLIC BLOOD PRESSURE: 71 MMHG | WEIGHT: 193 LBS

## 2022-05-16 DIAGNOSIS — I25.10 CORONARY ARTERY DISEASE INVOLVING NATIVE HEART, UNSPECIFIED VESSEL OR LESION TYPE, UNSPECIFIED WHETHER ANGINA PRESENT: ICD-10-CM

## 2022-05-16 DIAGNOSIS — R07.2 PRECORDIAL PAIN: ICD-10-CM

## 2022-05-16 DIAGNOSIS — J44.9 CHRONIC OBSTRUCTIVE PULMONARY DISEASE, UNSPECIFIED COPD TYPE: ICD-10-CM

## 2022-05-16 DIAGNOSIS — I48.0 PAROXYSMAL ATRIAL FIBRILLATION: Primary | ICD-10-CM

## 2022-05-16 DIAGNOSIS — R06.09 DYSPNEA ON EXERTION: ICD-10-CM

## 2022-05-16 PROCEDURE — 99214 OFFICE O/P EST MOD 30 MIN: CPT | Performed by: PHYSICIAN ASSISTANT

## 2022-05-16 RX ORDER — BUDESONIDE AND FORMOTEROL FUMARATE DIHYDRATE 160; 4.5 UG/1; UG/1
2 AEROSOL RESPIRATORY (INHALATION)
COMMUNITY

## 2022-05-16 NOTE — PROGRESS NOTES
Problem list     Subjective   Ira Stinson is a 62 y.o. female     Chief Complaint   Patient presents with   • Abnormal testing follow up     Stress and monitor     PROBLEM LIST:      1. Palpitations  a. Long history of chronic palpitations with multiple cardiac monitors placed in the past revealing short runs of SVT felt to be atrial tachycardia and frequent PVCs  b. 48-hour cardiac monitor placed 4/15/2012 with predominant normal sinus rhythm HR 54-98, AVG 72 bpm with noted PACs and PVCs with one 7 beat run SVT at 135 bpm  c. 48-hour cardiac monitor placed 1/20/2021 for palpitations with predominant normal sinus rhythm HR , AVG 74 bpm with 2 brief runs of SVT felt to be atrial tachycardia and noted PACs/PVCs with 1% VE/SVE burden and referral to EP for further evaluation.  d. Current event monitor May 2022 with frequent premature beats, run of nonsustained V. tach, paroxysmal A. fib identified  2. Coronary artery disease  a. Long history with multiple stents and CABG in the past  b. [facility] May 2014 with PTCA for IntraStent restenosis   c. Repeat catheterization in July 2014 due to new 70-80% stenosis with angioplasty, subsequent dissection and stenting. Recommendations to consider single vessel bypass due to high grade stenosis.  d. Coronary artery bypass grafting 2014  e. Follow-up catheterization in November 2016 demonstrated high-grade disease involving the vein graft to the circumflex as well as the arterial graft to the PDA. There was high-grade disease of the native right coronary artery. Patient underwent stenting of the vein graft to circumflex, PTCA and stenting of the vein graft to the right coronary artery and stenting of the native right coronary artery.  f. [facility] October 2019 demonstrated high-grade RCA disease.  There was subtotal IntraStent restenosis as well as distal disease.  Patient underwent stenting x2.  Vein graft to circumflex was patent as well as LIMA.  Occluded native circumflex.   Medical management recommended.  g. Echo 8/19/2019 EF 61-65%, no significant VHD, LA 4.4 cm  h. LHC August 2020 demonstrated patent LIMA to LAD and patent vein graft to circumflex with significant native vessel disease.  Patient had occluded RCA at the acute margin.  Vein graft RCA was treated.  Because of multiple interventions in the RCA, medical management was recommended.  i. LHC 4/8/2021 per Dr. Milton with documented three-vessel CAD W/2 out of 3 patent bypass grafts,  RCA and SVC to RCA with good collateral flow, and small vessel disease of LAD diagonal secondary branch with medical therapy recommended.  j. Stress test May 2022 demonstrated a large inferior and inferolateral wall ischemic defect with preserved LV function  3. Carotid artery disease  a. S/P left carotid endarterectomy and follow-up right endarterectomy, March 2011.  b. Documented residual nonobstructive left carotid stenosis.  c. Ccarotid duplex demonstrated 50 to 70% proximal right internal carotid artery stenosis  d. Carotid stenting per Dr. Avilez to right ICA  4. Essential hypertension  5. Dyslipidemia  6. Hypothyroidism  7. Supplemental oxygen therapy  a. Patient on continuous home oxygen since pneumonia March 2020 suspected to be Covid 5-day hospital stay.  8. AMBER      HPI    Patient is a 62-year-old female who presents to the office for evaluation to review stress test findings    Patient has history of coronary disease with multiple coronary interventions and coronary artery bypass grafting.  Her most recent catheterization was in April 2021 in which she underwent catheterization in [place].  Patient had a chronic total occlusion of the RCA and vein graft to RCA but good collateral flow.  She had disease involving the LAD and circumflex.  Medical management was recommended    Recently, her symptoms have progressed to a significant degree.  She feels significant discomfort in her chest.  She does not describe taking nitroglycerin  to resolve it but has been close to taking it.  She experiences the symptoms despite the fact that she has been on antianginal therapy and is very concerned.    Her dyspnea is moderate to severe.  She is on oxygen continuously and is very concerned about her level of dyspnea.  She follows locally with pulmonology.  She does not describe PND orthopnea    She does complain of palpitations or an irregular heartbeat at times and this has occurred more significant as of recent.  She has been on anticoagulation because of A. fib identified on event monitoring.  Otherwise she has currently stable at this time with no complaints of current chest pain    Current Outpatient Medications on File Prior to Visit   Medication Sig Dispense Refill   • albuterol (PROVENTIL) (2.5 MG/3ML) 0.083% nebulizer solution Take 2.5 mg by nebulization Every 4 (Four) Hours As Needed for Wheezing.     • ALPRAZolam (XANAX) 1 MG tablet Take 1 mg by mouth 2 (Two) Times a Day.  1   • apixaban (ELIQUIS) 5 MG tablet tablet Take 1 tablet by mouth 2 (Two) Times a Day. 28 tablet 0   • Ascorbic Acid (VITAMIN C PO) Take 1,000 mg by mouth Every Morning. CHEWABLES     • budesonide (PULMICORT) 0.5 MG/2ML nebulizer solution Take 0.5 mg by nebulization As Needed.     • budesonide-formoterol (SYMBICORT) 160-4.5 MCG/ACT inhaler Inhale 2 puffs 2 (Two) Times a Day.     • carvedilol (COREG) 6.25 MG tablet TAKE 1 TABLET BY MOUTH TWICE DAILY WITH MEALS 180 tablet 1   • clopidogrel (PLAVIX) 75 MG tablet Take 75 mg by mouth Daily.     • DULoxetine (CYMBALTA) 60 MG capsule Take 60 mg by mouth Daily.     • Evolocumab with Infusor (Repatha Pushtronex System) solution cartridge Inject 3.5 mL under the skin into the appropriate area as directed Every 30 (Thirty) Days. 1 cartridge. 11   • folic acid (FOLVITE) 1 MG tablet Take 1 mg by mouth Daily.     • furosemide (LASIX) 20 MG tablet TAKE 1 TABLET BY MOUTH ONCE DAILY AS NEEDED (EDEMA) 90 tablet 0   • isosorbide mononitrate  (IMDUR) 60 MG 24 hr tablet TAKE 1 TABLET BY MOUTH ONCE DAILY IN THE MORNING 90 tablet 1   • lansoprazole (PREVACID) 30 MG capsule Take 30 mg by mouth Daily.     • levothyroxine (SYNTHROID, LEVOTHROID) 25 MCG tablet Take 25 mcg by mouth Daily.     • metFORMIN ER (GLUCOPHAGE-XR) 500 MG 24 hr tablet Take 500 mg by mouth Daily.  2   • montelukast (SINGULAIR) 10 MG tablet Take 1 tablet by mouth Every Night. 90 tablet 0   • nicotine (NICODERM CQ) 14 MG/24HR patch Place 1 patch on the skin as directed by provider Daily. Uses occas.     • nitroglycerin (NITROSTAT) 0.4 MG SL tablet Place 0.4 mg under the tongue Every 5 (Five) Minutes As Needed.     • O2 (OXYGEN) Inhale 3 L/min 1 (One) Time.     • olmesartan (BENICAR) 20 MG tablet Take 1 tablet by mouth once daily 90 tablet 0   • pravastatin (PRAVACHOL) 40 MG tablet Take 1 tablet by mouth once daily 90 tablet 1   • ranolazine (RANEXA) 1000 MG 12 hr tablet Take 1 tablet by mouth Every 12 (Twelve) Hours. 180 tablet 3   • tiotropium (SPIRIVA) 18 MCG per inhalation capsule Place 1 capsule into inhaler and inhale Daily.     • VENTOLIN  (90 Base) MCG/ACT inhaler Inhale 2 puffs Every 6 (Six) Hours As Needed.     • vitamin D3 125 MCG (5000 UT) capsule capsule Take 5,000 Units by mouth Daily.     • Vitamin E 180 MG (400 UNIT) capsule capsule Take  by mouth Daily.     • Zinc 50 MG capsule Take 50 mg by mouth Every Night.     • [DISCONTINUED] Budeson-Glycopyrrol-Formoterol (Breztri Aerosphere) 160-9-4.8 MCG/ACT aerosol inhaler Inhale 2 puffs 2 (Two) Times a Day.       No current facility-administered medications on file prior to visit.       Oxycodone hcl, Acetaminophen, Atorvastatin, Codeine, Hydrocodone bitartrate er, Lortab [hydrocodone-acetaminophen], Percocet [oxycodone-acetaminophen], Propoxyphene, Protonix [pantoprazole sodium], Bactrim [sulfamethoxazole-trimethoprim], Clindamycin/lincomycin, Contrast dye, and Penicillins    Past Medical History:   Diagnosis Date   •  Anxiety    • Arthritis    • Autoimmune disorder ([facility])    • Carotid artery stenosis    • Chest pain    • COPD (chronic obstructive pulmonary disease) ([facility])    • Coronary artery disease     stents times 6   • Depression    • Diabetes ([facility])     type 2 dm, check blood sugar 2 to 3 times a week   • GERD (gastroesophageal reflux disease)    • Goiter    • Heart murmur    • Hiatal hernia    • History of transfusion    • Hyperlipidemia    • Hypertension    • Hypothyroidism    • Iron deficiency anemia    • Leaky heart valve    • Lichen planus    • Palpitations     with previous monitoring indicating short-lived episodes of SVT and frequent PVCs.   • Peripheral vascular disease ([facility])     with history of right carotid endarterectomy and follow-up right endarterectomy, 2011.   • PONV (postoperative nausea and vomiting)     severe   • PVC (premature ventricular contraction)    • PVD (peripheral vascular disease) ([facility])    • Shortness of breath    • SVT (supraventricular tachycardia) ([facility])    • Wears dentures     upper   • Wears reading eyeglasses        Social History     Socioeconomic History   • Marital status:    • Number of children: 2   Tobacco Use   • Smoking status: Former Smoker     Packs/day: 0.25     Years: 45.00     Pack years: 11.25     Types: Cigarettes     Quit date: 2022     Years since quittin.0   • Smokeless tobacco: Never Used   Substance and Sexual Activity   • Alcohol use: No   • Drug use: No   • Sexual activity: Defer       Family History   Problem Relation Age of Onset   • Coronary artery disease Other    • Hyperlipidemia Other    • Hypertension Other    • Cancer Other    • Heart disease Mother    • Heart disease Father    • Heart attack Brother    • Cancer Sister        Review of Systems   Constitutional: Negative.  Negative for chills and fever.   HENT: Negative.  Negative for congestion.    Respiratory: Positive for apnea (chris) and shortness of breath (oxygen). Negative for chest  "tightness.    Cardiovascular: Positive for chest pain, palpitations (races, flutter, skips) and leg swelling (at times).   Gastrointestinal: Negative.  Negative for abdominal pain, blood in stool, constipation, diarrhea, nausea and vomiting.   Genitourinary: Negative.  Negative for dysuria, frequency, hematuria and urgency.   Neurological: Positive for dizziness (with position change at times). Negative for syncope and light-headedness.   Psychiatric/Behavioral: Positive for sleep disturbance (bipap, oxygen, wakes with chest pain).       Objective   Vitals:    05/16/22 1534   BP: 151/71   BP Location: Left arm   Patient Position: Sitting   Pulse: 106   SpO2: 93%   Weight: 87.5 kg (193 lb)   Height: 167.6 cm (66\")      /71 (BP Location: Left arm, Patient Position: Sitting)   Pulse 106   Ht 167.6 cm (66\")   Wt 87.5 kg (193 lb)   SpO2 93% Comment: O2 @ 3 lpm  BMI 31.15 kg/m²     Lab Results (most recent)     None          Physical Exam  Vitals and nursing note reviewed.   Constitutional:       General: She is not in acute distress.     Appearance: Normal appearance. She is well-developed.   HENT:      Head: Normocephalic and atraumatic.   Eyes:      General: No scleral icterus.        Right eye: No discharge.         Left eye: No discharge.      Conjunctiva/sclera: Conjunctivae normal.   Neck:      Vascular: Carotid bruit present.   Cardiovascular:      Rate and Rhythm: Normal rate and regular rhythm.      Heart sounds: Normal heart sounds. No murmur heard.    No friction rub. No gallop.      Comments: Grade 1/6 to 2/6 systolic ejection murmur upper sternal border.  Grade 1/6 systolic murmur entirety of left sternal border  Pulmonary:      Effort: Pulmonary effort is normal. No respiratory distress.      Breath sounds: Normal breath sounds. No wheezing or rales.   Chest:      Chest wall: No tenderness.   Musculoskeletal:      Right lower leg: No edema.      Left lower leg: No edema.   Skin:     General: Skin " is warm and dry.      Coloration: Skin is not pale.      Findings: No erythema or rash.   Neurological:      Mental Status: She is alert and oriented to person, place, and time.      Cranial Nerves: No cranial nerve deficit.   Psychiatric:         Behavior: Behavior normal.         Procedure   Procedures       Assessment & Plan     Problems Addressed this Visit        Cardiac and Vasculature    Coronary artery disease involving native heart    Relevant Orders    CBC & Differential    Comprehensive Metabolic Panel    [facility]    Chest pain    Relevant Orders    CBC & Differential    Comprehensive Metabolic Panel    [facility]    Paroxysmal atrial fibrillation (HCC) - Primary    Relevant Orders    CBC & Differential    Comprehensive Metabolic Panel    [facility]       Pulmonary and Pneumonias    COPD     Relevant Medications    tiotropium (SPIRIVA) 18 MCG per inhalation capsule    budesonide-formoterol (SYMBICORT) 160-4.5 MCG/ACT inhaler    Other Relevant Orders    Ambulatory Referral to Pulmonology      Other Visit Diagnoses     Dyspnea on exertion        Relevant Orders    Ambulatory Referral to Pulmonology      Diagnoses       Codes Comments    Paroxysmal atrial fibrillation (HCC)    -  Primary ICD-10-CM: I48.0  ICD-9-CM: 427.31     Coronary artery disease involving native heart, unspecified vessel or lesion type, unspecified whether angina present     ICD-10-CM: I25.10  ICD-9-CM: 414.01     Precordial pain     ICD-10-CM: R07.2  ICD-9-CM: 786.51     Chronic obstructive pulmonary disease, unspecified COPD type (HCC)     ICD-10-CM: J44.9  ICD-9-CM: 496     Dyspnea on exertion     ICD-10-CM: R06.00  ICD-9-CM: 786.09         Recommendation  1.  Patient is a 62-year-old female with significant chest discomfort.  Patient has multiple coronary interventions with history of stenting procedures as well as history of bypass.  She has a large inferior ischemic wall defect and patient is  experiencing resting chest discomfort on antianginal therapy.  She does have a chronic total occlusion of the RCA and vein graft RCA with collateral flow by catheterization last year.  However, she is having a run of nonsustained V. tach with significant pain.  I feel that repeat catheterization should be performed to evaluate coronary anatomy.  We will schedule on an expedited basis, if possible, in [place].  Any chest pain, noticeable nitroglycerin, recommend ER evaluation    2.  Paroxysmal A. fib notified on a critical event monitor report.  She is on anticoagulation at this time and will continue.  Patient would like referral to another pulmonologist and we will make referral.    3.  Otherwise, her A. fib may need to be treated with rhythm control therapy.  However, she would like to start with catheterization which I feel is the most pressing issue.  We will send referral to [place] to have this done hopefully on an expedited basis.  I did discuss that if she does not hear back in 1 to 2 days, to call our office and her and her family verbalizes understanding and acknowledges    4.  We will see her back for follow-up after catheterization.  Follow-up with primary as scheduled             Patient brought in medicine list to appointment, it's been reviewed with patient and med list was updated in the chart.          Electronically signed by:

## 2022-05-17 RX ORDER — DIPHENHYDRAMINE HCL 25 MG
TABLET ORAL
Qty: 3 TABLET | Refills: 0 | Status: SHIPPED | OUTPATIENT
Start: 2022-05-17 | End: 2022-08-08 | Stop reason: ALTCHOICE

## 2022-05-17 RX ORDER — PREDNISONE 20 MG/1
TABLET ORAL
Qty: 3 TABLET | Refills: 0 | Status: SHIPPED | OUTPATIENT
Start: 2022-05-17

## 2022-05-17 RX ORDER — FAMOTIDINE 20 MG/1
TABLET, FILM COATED ORAL
Qty: 3 TABLET | Refills: 0 | Status: SHIPPED | OUTPATIENT
Start: 2022-05-17

## 2022-06-01 LAB
BH CV ECHO MEAS - ACS: 1.31 CM
BH CV ECHO MEAS - AI P1/2T: 275.7 MSEC
BH CV ECHO MEAS - AO MAX PG: 34.3 MMHG
BH CV ECHO MEAS - AO MEAN PG: 22.3 MMHG
BH CV ECHO MEAS - AO ROOT DIAM: 3.2 CM
BH CV ECHO MEAS - AO V2 MAX: 292.7 CM/SEC
BH CV ECHO MEAS - AO V2 VTI: 62.5 CM
BH CV ECHO MEAS - AVA(I,D): 1.18 CM2
BH CV ECHO MEAS - EDV(CUBED): 133.4 ML
BH CV ECHO MEAS - EDV(MOD-SP4): 82.5 ML
BH CV ECHO MEAS - EF(MOD-SP4): 49.8 %
BH CV ECHO MEAS - EF_3D-VOL: 54 %
BH CV ECHO MEAS - ESV(CUBED): 59.8 ML
BH CV ECHO MEAS - ESV(MOD-SP4): 41.4 ML
BH CV ECHO MEAS - FS: 23.5 %
BH CV ECHO MEAS - IVS/LVPW: 1.08 CM
BH CV ECHO MEAS - IVSD: 1.43 CM
BH CV ECHO MEAS - LA DIMENSION: 4.6 CM
BH CV ECHO MEAS - LAT PEAK E' VEL: 8.1 CM/SEC
BH CV ECHO MEAS - LV DIASTOLIC VOL/BSA (35-75): 42.3 CM2
BH CV ECHO MEAS - LV MASS(C)D: 295.1 GRAMS
BH CV ECHO MEAS - LV MAX PG: 4.3 MMHG
BH CV ECHO MEAS - LV MEAN PG: 2.7 MMHG
BH CV ECHO MEAS - LV SYSTOLIC VOL/BSA (12-30): 21.2 CM2
BH CV ECHO MEAS - LV V1 MAX: 103.1 CM/SEC
BH CV ECHO MEAS - LV V1 VTI: 24.5 CM
BH CV ECHO MEAS - LVIDD: 5.1 CM
BH CV ECHO MEAS - LVIDS: 3.9 CM
BH CV ECHO MEAS - LVOT AREA: 3 CM2
BH CV ECHO MEAS - LVOT DIAM: 1.96 CM
BH CV ECHO MEAS - LVPWD: 1.33 CM
BH CV ECHO MEAS - MED PEAK E' VEL: 7.2 CM/SEC
BH CV ECHO MEAS - MV A MAX VEL: 80.6 CM/SEC
BH CV ECHO MEAS - MV DEC SLOPE: 675.6 CM/SEC2
BH CV ECHO MEAS - MV E MAX VEL: 119 CM/SEC
BH CV ECHO MEAS - MV E/A: 1.48
BH CV ECHO MEAS - RVDD: 2.6 CM
BH CV ECHO MEAS - SI(MOD-SP4): 21.1 ML/M2
BH CV ECHO MEAS - SV(LVOT): 73.6 ML
BH CV ECHO MEAS - SV(MOD-SP4): 41.1 ML
BH CV ECHO MEASUREMENTS AVERAGE E/E' RATIO: 15.56
LEFT ATRIUM VOLUME INDEX: 21.4 ML/M2
MAXIMAL PREDICTED HEART RATE: 156 BPM
STRESS TARGET HR: 133 BPM

## 2022-06-06 ENCOUNTER — TELEPHONE (OUTPATIENT)
Dept: CARDIOLOGY | Facility: CLINIC | Age: 62
End: 2022-06-06

## 2022-06-08 ENCOUNTER — OFFICE VISIT (OUTPATIENT)
Dept: CARDIOLOGY | Facility: CLINIC | Age: 62
End: 2022-06-08

## 2022-06-08 VITALS
WEIGHT: 194 LBS | OXYGEN SATURATION: 91 % | BODY MASS INDEX: 31.18 KG/M2 | HEART RATE: 90 BPM | DIASTOLIC BLOOD PRESSURE: 53 MMHG | SYSTOLIC BLOOD PRESSURE: 100 MMHG | HEIGHT: 66 IN

## 2022-06-08 DIAGNOSIS — I35.0 AORTIC STENOSIS, MODERATE: ICD-10-CM

## 2022-06-08 DIAGNOSIS — R06.09 DYSPNEA ON EXERTION: ICD-10-CM

## 2022-06-08 DIAGNOSIS — I25.10 CORONARY ARTERY DISEASE INVOLVING NATIVE HEART, UNSPECIFIED VESSEL OR LESION TYPE, UNSPECIFIED WHETHER ANGINA PRESENT: ICD-10-CM

## 2022-06-08 DIAGNOSIS — I48.0 PAROXYSMAL ATRIAL FIBRILLATION: Primary | ICD-10-CM

## 2022-06-08 PROCEDURE — 99214 OFFICE O/P EST MOD 30 MIN: CPT | Performed by: PHYSICIAN ASSISTANT

## 2022-06-08 NOTE — PROGRESS NOTES
Problem list     Subjective   Ira Stinson is a 62 y.o. female     Chief Complaint   Patient presents with   • Heart cath follow up     5-26-22 new stent  Angio seal   • [facility] Follow up   PROBLEM LIST:      1. Palpitations  a. Long history of chronic palpitations with multiple cardiac monitors placed in the past revealing short runs of SVT felt to be atrial tachycardia and frequent PVCs  b. 48-hour cardiac monitor placed 4/15/2012 with predominant normal sinus rhythm HR 54-98, AVG 72 bpm with noted PACs and PVCs with one 7 beat run SVT at 135 bpm  c. 48-hour cardiac monitor placed 1/20/2021 for palpitations with predominant normal sinus rhythm HR , AVG 74 bpm with 2 brief runs of SVT felt to be atrial tachycardia and noted PACs/PVCs with 1% VE/SVE burden and referral to EP for further evaluation.  d. Current event monitor May 2022 with frequent premature beats, run of nonsustained V. tach, paroxysmal A. fib identified  2. Coronary artery disease  a. Long history with multiple stents and CABG in the past  b. [facility] May 2014 with PTCA for IntraStent restenosis   c. Repeat catheterization in July 2014 due to new 70-80% stenosis with angioplasty, subsequent dissection and stenting. Recommendations to consider single vessel bypass due to high grade stenosis.  d. Coronary artery bypass grafting 2014  e. Follow-up catheterization in November 2016 demonstrated high-grade disease involving the vein graft to the circumflex as well as the arterial graft to the PDA. There was high-grade disease of the native right coronary artery. Patient underwent stenting of the vein graft to circumflex, PTCA and stenting of the vein graft to the right coronary artery and stenting of the native right coronary artery.  f. [facility] October 2019 demonstrated high-grade RCA disease.  There was subtotal IntraStent restenosis as well as distal disease.  Patient underwent stenting x2.  Vein graft to circumflex was patent as well as LIMA.  Occluded  native circumflex.  Medical management recommended.  g. Echo 8/19/2019 EF 61-65%, no significant VHD, LA 4.4 cm  h. LHC August 2020 demonstrated patent LIMA to LAD and patent vein graft to circumflex with significant native vessel disease.  Patient had occluded RCA at the acute margin.  Vein graft RCA was treated.  Because of multiple interventions in the RCA, medical management was recommended.  i. LHC 4/8/2021 per Dr. Milton with documented three-vessel CAD W/2 out of 3 patent bypass grafts,  RCA and SVC to RCA with good collateral flow, and small vessel disease of LAD diagonal secondary branch with medical therapy recommended.  j. Cardiac catheterization June 2022 with stenting of the vein graft to circumflex.  LIMA to LAD was patent RCA and saphenous vein graft RCA was occluded  3. Carotid artery disease  a. S/P left carotid endarterectomy and follow-up right endarterectomy, March 2011.  b. Documented residual nonobstructive left carotid stenosis.  c. Ccarotid duplex demonstrated 50 to 70% proximal right internal carotid artery stenosis  d. Carotid stenting per Dr. Avilez to right ICA  4. Essential hypertension  5. Dyslipidemia  6. Aortic stenosis  6.1 echocardiogram June 2022 with a peak instantaneous gradient of 34, mean gradient of 22 and an aortic valve area 1.2 cm²  6.2 echocardiogram [facility], and [facility] with aortic valve area estimated at 0.9 cm² with a peak gradient of 40 and a mean gradient of approximately 24  7. Paroxysmal atrial fibrillation  7.1 diagnosed by event monitor 2022 with patient previously on Eliquis for anticoagulation.  Multiple PVCs noted during study as well  8. Supplemental oxygen therapy  a. Patient on continuous home oxygen since pneumonia March 2020 suspected to be Covid 5-day hospital stay.  9. AMBER  10.   IV contrast allergy      HPI    Patient is a 62-year-old female who presents back to the office for follow-up.  As above, she has history of significant vasculopathy  involving carotid endarterectomies and multiple coronary interventions.    Recently, she had catheterization because of symptoms and abnormal stress test with stenting of the vein graft to circumflex with an occluded vein graft RCA and native RCA with patent LIMA to LAD.  She had recent diagnosis with paroxysmal A. fib and was placed on Eliquis.  She is not on rhythm control therapy and currently is on beta-blocker therapy.  She also has newly diagnosed aortic stenosis    She feels much better post intervention.  She apparently had some type of allergic reaction.  She describes having reaction to IV contrast and went to the hospital and apparently was significantly hypertensive with chest pain and was admitted.  She eventually was discharged.  She stopped her Eliquis as there was some concern about a possible allergic reaction but she had been on that for quite some time without any interaction    She has no chest pain or pressure and her dyspnea is moderate to severe with patient on oxygen continuously.  She does not describe PND orthopnea.    She does palpitate quite often.  She feels frequent palpitations and fluttering sensations.  She does not describe any presyncope or syncope.  She does not describe any symptoms of cerebral embolic events.  Otherwise a stable      Current Outpatient Medications on File Prior to Visit   Medication Sig Dispense Refill   • albuterol (PROVENTIL) (2.5 MG/3ML) 0.083% nebulizer solution Take 2.5 mg by nebulization Every 4 (Four) Hours As Needed for Wheezing.     • ALPRAZolam (XANAX) 1 MG tablet Take 1 mg by mouth 2 (Two) Times a Day.  1   • Ascorbic Acid (VITAMIN C PO) Take 1,000 mg by mouth Every Morning. CHEWABLES     • budesonide (PULMICORT) 0.5 MG/2ML nebulizer solution Take 0.5 mg by nebulization As Needed.     • budesonide-formoterol (SYMBICORT) 160-4.5 MCG/ACT inhaler Inhale 2 puffs 2 (Two) Times a Day.     • carvedilol (COREG) 6.25 MG tablet TAKE 1 TABLET BY MOUTH TWICE  DAILY WITH MEALS 180 tablet 1   • clopidogrel (PLAVIX) 75 MG tablet Take 75 mg by mouth Daily.     • diphenhydrAMINE (Benadryl Allergy) 25 MG tablet Take one tablet po bid day before heart cath and one tab morning of cath 3 tablet 0   • DULoxetine (CYMBALTA) 60 MG capsule Take 60 mg by mouth Daily.     • Evolocumab with Infusor (Repatha Pushtronex System) solution cartridge Inject 3.5 mL under the skin into the appropriate area as directed Every 30 (Thirty) Days. 1 cartridge. 11   • famotidine (Pepcid) 20 MG tablet Take one tablet po bid day before heart cath and one tab morning of cath 3 tablet 0   • folic acid (FOLVITE) 1 MG tablet Take 1 mg by mouth Daily.     • furosemide (LASIX) 20 MG tablet TAKE 1 TABLET BY MOUTH ONCE DAILY AS NEEDED (EDEMA) 90 tablet 0   • isosorbide mononitrate (IMDUR) 60 MG 24 hr tablet TAKE 1 TABLET BY MOUTH ONCE DAILY IN THE MORNING 90 tablet 1   • lansoprazole (PREVACID) 30 MG capsule Take 30 mg by mouth Daily.     • levothyroxine (SYNTHROID, LEVOTHROID) 25 MCG tablet Take 25 mcg by mouth Daily.     • metFORMIN ER (GLUCOPHAGE-XR) 500 MG 24 hr tablet Take 500 mg by mouth Daily.  2   • montelukast (SINGULAIR) 10 MG tablet Take 1 tablet by mouth Every Night. 90 tablet 0   • nitroglycerin (NITROSTAT) 0.4 MG SL tablet Place 0.4 mg under the tongue Every 5 (Five) Minutes As Needed.     • O2 (OXYGEN) Inhale 3 L/min 1 (One) Time.     • olmesartan (BENICAR) 20 MG tablet Take 1 tablet by mouth once daily 90 tablet 0   • pravastatin (PRAVACHOL) 40 MG tablet Take 1 tablet by mouth once daily 90 tablet 1   • predniSONE (DELTASONE) 20 MG tablet Take one tablet po bid day before heart cath and one tab morning of cath 3 tablet 0   • ranolazine (RANEXA) 1000 MG 12 hr tablet Take 1 tablet by mouth Every 12 (Twelve) Hours. 180 tablet 3   • tiotropium (SPIRIVA) 18 MCG per inhalation capsule Place 1 capsule into inhaler and inhale Daily.     • VENTOLIN  (90 Base) MCG/ACT inhaler Inhale 2 puffs Every 6  (Six) Hours As Needed.     • vitamin D3 125 MCG (5000 UT) capsule capsule Take 5,000 Units by mouth Daily.     • Vitamin E 180 MG (400 UNIT) capsule capsule Take  by mouth Daily.     • Zinc 50 MG capsule Take 50 mg by mouth Every Night.     • nicotine (NICODERM CQ) 14 MG/24HR patch Place 1 patch on the skin as directed by provider Daily. Uses occas.       No current facility-administered medications on file prior to visit.       Oxycodone hcl, Acetaminophen, Atorvastatin, Codeine, Hydrocodone bitartrate er, Lortab [hydrocodone-acetaminophen], Percocet [oxycodone-acetaminophen], Propoxyphene, Protonix [pantoprazole sodium], Bactrim [sulfamethoxazole-trimethoprim], Clindamycin/lincomycin, Contrast dye, and Penicillins    Past Medical History:   Diagnosis Date   • Anxiety    • Arthritis    • Autoimmune disorder ([facility])    • Carotid artery stenosis    • Chest pain    • COPD (chronic obstructive pulmonary disease) ([facility])    • Coronary artery disease     stents times 6   • Depression    • Diabetes ([facility])     type 2 dm, check blood sugar 2 to 3 times a week   • GERD (gastroesophageal reflux disease)    • Goiter    • Heart murmur    • Hiatal hernia    • History of transfusion    • Hyperlipidemia    • Hypertension    • Hypothyroidism    • Iron deficiency anemia    • Leaky heart valve    • Lichen planus    • Palpitations     with previous monitoring indicating short-lived episodes of SVT and frequent PVCs.   • Peripheral vascular disease ([facility])     with history of right carotid endarterectomy and follow-up right endarterectomy, March 2011.   • PONV (postoperative nausea and vomiting)     severe   • PVC (premature ventricular contraction)    • PVD (peripheral vascular disease) ([facility])    • Shortness of breath    • SVT (supraventricular tachycardia) ([facility])    • Wears dentures     upper   • Wears reading eyeglasses        Social History     Socioeconomic History   • Marital status:    • Number of children: 2   Tobacco Use   •  "Smoking status: Former Smoker     Packs/day: 0.25     Years: 45.00     Pack years: 11.25     Types: Cigarettes     Quit date: 2022     Years since quittin.1   • Smokeless tobacco: Never Used   Substance and Sexual Activity   • Alcohol use: No   • Drug use: No   • Sexual activity: Defer       Family History   Problem Relation Age of Onset   • Coronary artery disease Other    • Hyperlipidemia Other    • Hypertension Other    • Cancer Other    • Heart disease Mother    • Heart disease Father    • Heart attack Brother    • Cancer Sister        Review of Systems   Constitutional: Negative.  Negative for chills and fever.   HENT: Negative.  Negative for congestion and sinus pressure.    Respiratory: Positive for shortness of breath (uses oxygen). Negative for chest tightness.    Cardiovascular: Positive for palpitations (races, flutters). Negative for chest pain and leg swelling.   Gastrointestinal: Negative.  Negative for blood in stool.   Endocrine: Negative.  Negative for cold intolerance and heat intolerance.   Genitourinary: Negative.  Negative for hematuria.   Neurological: Positive for dizziness (when up moving around). Negative for syncope and light-headedness.   Hematological: Bruises/bleeds easily.   Psychiatric/Behavioral: Positive for sleep disturbance (bipap, oxygen, denies waking with soa or cp).       Objective   Vitals:    22 1508   BP: 100/53   BP Location: Right arm   Patient Position: Sitting   Pulse: 90   SpO2: 91%   Weight: 88 kg (194 lb)   Height: 167.6 cm (66\")      /53 (BP Location: Right arm, Patient Position: Sitting)   Pulse 90   Ht 167.6 cm (66\")   Wt 88 kg (194 lb)   SpO2 91% Comment: O2 @ 2 LPM pulsate  BMI 31.31 kg/m²     Lab Results (most recent)     None          Physical Exam  Vitals and nursing note reviewed.   Constitutional:       General: She is not in acute distress.     Appearance: Normal appearance. She is well-developed.   HENT:      Head: Normocephalic " and atraumatic.   Eyes:      General: No scleral icterus.        Right eye: No discharge.         Left eye: No discharge.      Conjunctiva/sclera: Conjunctivae normal.   Neck:      Vascular: Carotid bruit present.   Cardiovascular:      Rate and Rhythm: Normal rate and regular rhythm.      Heart sounds: Normal heart sounds. No murmur heard.    No friction rub. No gallop.      Comments: Grade 1/6 to 2/6 systolic ejection murmur upper sternal border.  Grade 1/6 systolic murmur entirety of left sternal border  Pulmonary:      Effort: Pulmonary effort is normal. No respiratory distress.      Breath sounds: Normal breath sounds. No wheezing or rales.   Chest:      Chest wall: No tenderness.   Musculoskeletal:      Right lower leg: No edema.      Left lower leg: No edema.   Skin:     General: Skin is warm and dry.      Coloration: Skin is not pale.      Findings: No erythema or rash.   Neurological:      Mental Status: She is alert and oriented to person, place, and time.      Cranial Nerves: No cranial nerve deficit.   Psychiatric:         Behavior: Behavior normal.         Procedure   Procedures       Assessment & Plan     Problems Addressed this Visit        Cardiac and Vasculature    Coronary artery disease involving native heart    Relevant Orders    Ambulatory Referral to Cardiac Electrophysiology    Dyspnea on exertion    Relevant Orders    Ambulatory Referral to Cardiac Electrophysiology    Paroxysmal atrial fibrillation (HCC) - Primary    Relevant Orders    Ambulatory Referral to Cardiac Electrophysiology    Aortic stenosis, moderate    Relevant Orders    Ambulatory Referral to Cardiac Electrophysiology      Diagnoses       Codes Comments    Paroxysmal atrial fibrillation (HCC)    -  Primary ICD-10-CM: I48.0  ICD-9-CM: 427.31     Coronary artery disease involving native heart, unspecified vessel or lesion type, unspecified whether angina present     ICD-10-CM: I25.10  ICD-9-CM: 414.01     Aortic stenosis,  moderate     ICD-10-CM: I35.0  ICD-9-CM: 424.1     Dyspnea on exertion     ICD-10-CM: R06.00  ICD-9-CM: 786.09         Recommendation  1.  Patient is a 62-year-old female with history of coronary disease status post intervention involving the saphenous vein graft to circumflex.  She has without any chest pain or pressure.  Her dyspnea is moderate to severe.  However she has improved post intervention.  For now, we will continue current medical treatment with antiplatelet therapy and Repatha    2.  Patient with aortic stenosis identified which appears to be moderate to severe.  She has no angina but moderate months of dyspnea.  We will need to monitor this closely.  Echocardiogram here in the office suggest an aortic valve area 1.2 cm² to the hospital 0.9 cm².  She does not describe symptoms of syncope, CHF or angina.  For now we will monitor closely    3.  In regards to atrial fibrillation, we need her back on anticoagulation.  I do not feel Eliquis was the cause of her reaction as she had been on this for weeks.  After catheterization, she apparently developed a allergic reaction but has history of allergy to IV dye.  We will place her on Xarelto.     4.  She would like referral to EP services in regards to possible intervention for her paroxysmal A. fib.  We discussed rhythm control medicine but she is not interested in antiarrhythmics at this point.  We will make referral to EP services at this time    5.  Otherwise, her dyspnea is moderate to severe and likely related to her lung disease.  We will continue the same and see her back for follow-up in 3 to 4 months or sooner as symptoms discussed.  Follow-up with primary as scheduled         Patient did not bring med list or medicine bottles to appointment, med list has been reviewed and updated based on patient's knowledge of their meds.       Electronically signed by:

## 2022-06-10 ENCOUNTER — APPOINTMENT (OUTPATIENT)
Dept: CARDIOLOGY | Facility: HOSPITAL | Age: 62
End: 2022-06-10

## 2022-06-14 ENCOUNTER — TELEPHONE (OUTPATIENT)
Dept: CARDIOLOGY | Facility: CLINIC | Age: 62
End: 2022-06-14

## 2022-06-14 NOTE — TELEPHONE ENCOUNTER
Can you talk with her about considering Eliquis?  We had talked last visit about her allergic reaction that I felt was more related to the IV dye rather than the Eliquis.  All the anticoagulants obviously can cause bleeding but I do not remember her having these issues on Eliquis.  Can you discuss that with her and let me know?

## 2022-06-14 NOTE — TELEPHONE ENCOUNTER
Patient states she also thinks it was the IV dye and not the Eliquis, and will continue it if that is what you recommend.

## 2022-06-14 NOTE — TELEPHONE ENCOUNTER
D/C xarelto. Start Eliquis 5 mg bid.    Patient also was checking on  referral, I advised referral has been placed, will be called with catina time and date.    BINTA MATHEWS MA

## 2022-06-14 NOTE — TELEPHONE ENCOUNTER
Patient called and left message on triage line, that she is concerned with taking her Xarelto, and it may need decreased.    When brushing teeth, has noticed tongue will bleed, and when blowing nose will notice blood clots.    BINTA MATHEWS MA

## 2022-06-15 ENCOUNTER — TELEPHONE (OUTPATIENT)
Dept: CARDIOLOGY | Facility: CLINIC | Age: 62
End: 2022-06-15

## 2022-07-19 ENCOUNTER — TELEPHONE (OUTPATIENT)
Dept: CARDIOLOGY | Facility: CLINIC | Age: 62
End: 2022-07-19

## 2022-07-19 NOTE — TELEPHONE ENCOUNTER
Patient called and states she has an appointment with  and would like to have EKG prior to appointment for him. I advised him being associated with [facility], should be able to access records.    Patient verbally understands,and will call back with anymore concerns.    BINTA MATHEWS MA

## 2022-07-21 DIAGNOSIS — I10 ESSENTIAL HYPERTENSION: ICD-10-CM

## 2022-07-21 RX ORDER — OLMESARTAN MEDOXOMIL 20 MG/1
20 TABLET ORAL DAILY
Qty: 90 TABLET | Refills: 3 | Status: SHIPPED | OUTPATIENT
Start: 2022-07-21 | End: 2022-11-30

## 2022-07-21 NOTE — TELEPHONE ENCOUNTER
Patient called needing refill of Olmesartan, she is out. Pending order not signed from 7/18/22. Dina Astorga MA

## 2022-07-25 PROBLEM — I20.0 UNSTABLE ANGINA: Status: RESOLVED | Noted: 2021-03-31 | Resolved: 2022-07-25

## 2022-07-25 PROBLEM — I38 HEART VALVE DISEASE: Status: ACTIVE | Noted: 2022-06-08

## 2022-07-25 NOTE — PROGRESS NOTES
Cardiac Electrophysiology Outpatient Consult Note            [facility]    Consult Note     Ira Stinson  [id number]  07/26/2022  [phone]     Primary Care Physician: Ernesto Whitfield MD    Referred By: Hardik Victor PA    Subjective     Chief Complaint:   Diagnoses and all orders for this visit:    1. PSVT (paroxysmal supraventricular tachycardia) (HCC) (Primary)    2. Paroxysmal atrial fibrillation (HCC)    3. PVC (premature ventricular contraction)    4. Atrial tachycardia (HCC)      Chief Complaint   Patient presents with   • Paroxysmal atrial fibrillation       History of Present Illness:   Ira Stinson is a 62 y.o. female who presents to my electrophysiology clinic for evaluation of this is a 62-year-old female whose cardiac history is significant for coronary artery disease, valvular heart disease, atrial fibrillation. She presented to her primary cardiologist in April of this year with complaint of recurrent angina.  She underwent repeat cardiac catheterization which revealed 2 out of 3 patent bypass grafts and  of the RCA.  She had a repeat echocardiogram showing preserved LV systolic function with a peak aortic gradient of 34 mmHg and a mean of 22 mmHg.  She complains of ongoing palpitations on a near daily basis.  She had a 30-day event monitor which shows an 8% PVC burden, NSVT up to 6 beats, paroxysmal atrial fibrillation and short runs of atrial tachycardia.  She was then referred to our service for evaluation of her arrhythmias.  She has had a subsequent echocardiogram in June of this year which continues to show normal LV systolic function, dilated left atrium of 5.4 cm and severe aortic stenosis.  At present she was without complaints of chest pain or dyspnea beyond baseline.  She complains of shortness of breath and dizziness with her VT and states that she has A. fib which she feels when she lies on her left side.  There is no  current orthopnea, PND, claudication or lower extremity edema.  She has had no dizziness or syncope.  At present she is anticoagulated and states compliance with current medical regimen.    Past Medical History:   Patient Active Problem List    Diagnosis Date Noted   • PSVT (paroxysmal supraventricular tachycardia) (HCC) 07/26/2022     Priority: High     Note Last Updated: 7/26/2022     · 30-day event monitor, 4/26/2022 through 5/31/2022: Frequent PVCs with 8% PVC burden.  Multiple runs of NSVT highest rate at 190 bpm (6 beats).  Paroxysmal A. fib.  Short runs of atrial tachycardia.     • Paroxysmal atrial fibrillation (HCC) 05/16/2022     Priority: High     Note Last Updated: 7/26/2022     · Echo, 6/4/2022: EF 55-60%.  Dilated left atrium, 5.4 cm.  · 30-day event monitor, 4/26/2022 through 5/31/2022: Frequent PVCs with 8% PVC burden.  Multiple runs of NSVT highest rate at 190 bpm (6 beats).  Paroxysmal A. fib.  Short runs of atrial tachycardia.     • PVC (premature ventricular contraction)      Priority: High     Note Last Updated: 7/26/2022     · 30-day event monitor, 4/26/2022 through 5/31/2022: Frequent PVCs with 8% PVC burden.  Multiple runs of NSVT highest rate at 190 bpm (6 beats).  Paroxysmal A. fib.  Short runs of atrial tachycardia.     • Atrial tachycardia (HCC)      Priority: High     Note Last Updated: 7/26/2022     · previous monitoring indicating short-lived episodes of SVT and frequent PVCs.  · 30-day event monitor, 4/26/2022 through 5/31/2022: Frequent PVCs with 8% PVC burden.  Multiple runs of NSVT highest rate at 190 bpm (6 beats).  Paroxysmal A. fib.  Short runs of atrial tachycardia.     • Heart valve disease 06/08/2022     Priority: Medium     Note Last Updated: 7/26/2022     · Echo 5-9-22: Parameters include a peak instantaneous gradient of 34, mean gradient of 22, and an aortic valve area of 1.2 cm². There is mild AI by color Doppler sampling.  · Echo, 6/4/2022: EF 55-60%.  Dilated left  atrium, 5.4 cm.  Mild aortic regurgitation.  Moderate to severe aortic stenosis with peak/mean pressure gradient of 40.67 mmHg.  Moderate MR.  RVSP 50 mmHg     • Coronary artery disease involving native heart 08/02/2016     Priority: Medium     Note Last Updated: 7/26/2022       · 2000: CABG x3 with LIMA to LAD, vein graft to right coronary artery, vein graft to circumflex.   · May 2014: Cardiac catheterization with PTCA for IntraStent restenosis in the free radial to the RCA.  · July 2014: Repeat cardiac catheterization due to new 70-80% stenosis with angioplasty, subsequent dissection and stenting.   · November 2016:  Patient underwent stenting of the vein graft to circumflex, PTCA and stenting of the vein graft to the right coronary artery and stenting of the native right coronary artery.  · October 2019: Cardiac catheterization demonstrated high-grade RCA disease.  There was subtotal IntraStent restenosis as well as distal disease.  Patient underwent stenting x2.  Vein graft to circumflex was patent as well as LIMA. Occluded native circumflex.  Medical management recommended.  · [facility], 4/8/2021: Three-vessel CAD. 2/3 patent bypass grafts.  of the RCA and SVG to RCA with good collateral flow. Small vessel disease of LAD diagonal secondary branch       • Carotid stenosis s/p right carotid stent per Dr. Nazario 12/12/19 11/27/2019     Priority: Low     Note Last Updated: 7/25/2022     · Right carotid endarterectomy and follow-up right endarterectomy, March 2011.     • Hyperlipidemia 08/02/2016     Priority: Low   • Essential hypertension      Priority: Low   • History of tobacco abuse 12/12/2019   • COPD  12/12/2019   • Type 2 diabetes mellitus (HCC) 12/12/2019   • AMBER treated with BiPAP 12/12/2019   • Hypothyroidism on Rx    • Anxiety    • Depression        Past Surgical History:   Past Surgical History:   Procedure Laterality Date   • CARDIAC CATHETERIZATION  10/07/2019    patient reports having 6 or 7 heart caths  with 6 stents total   • CARDIAC CATHETERIZATION N/A 4/8/2021    Procedure: Left Heart Cath 51522;  Surgeon: Mckinley Milton MD;  Location:  [facility];  Service: Cardiovascular;  Laterality: N/A;   • CAROTID ENDARTERECTOMY Right 03/14/2011    Dr. Sharif   • CATARACT EXTRACTION, BILATERAL     • COLONOSCOPY      2016   • CORONARY ARTERY BYPASS GRAFT  2000    X3   • CORONARY STENT PLACEMENT     • HERNIA REPAIR      Umbilical Hernia Repair X2   • MO TCAT IV STENT CRV CRTD ART EMBOLIC PROTECJ Right 12/12/2019    Procedure: Carotid Stent;  Surgeon: Qamar Nazario MD;  Location:  [facility];  Service: Interventional Radiology   • TUBAL ABDOMINAL LIGATION         Medications:     Current Outpatient Medications:   •  albuterol (PROVENTIL) (2.5 MG/3ML) 0.083% nebulizer solution, Take 2.5 mg by nebulization Every 4 (Four) Hours As Needed for Wheezing., Disp: , Rfl:   •  ALPRAZolam (XANAX) 1 MG tablet, Take 1 mg by mouth 2 (Two) Times a Day., Disp: , Rfl: 1  •  apixaban (ELIQUIS) 5 MG tablet tablet, Take 1 tablet by mouth 2 (Two) Times a Day., Disp: 60 tablet, Rfl: 5  •  Ascorbic Acid (VITAMIN C PO), Take 1,000 mg by mouth Every Morning. CHEWABLES, Disp: , Rfl:   •  budesonide (PULMICORT) 0.5 MG/2ML nebulizer solution, Take 0.5 mg by nebulization As Needed., Disp: , Rfl:   •  budesonide-formoterol (SYMBICORT) 160-4.5 MCG/ACT inhaler, Inhale 2 puffs 2 (Two) Times a Day., Disp: , Rfl:   •  carvedilol (COREG) 6.25 MG tablet, TAKE 1 TABLET BY MOUTH TWICE DAILY WITH MEALS, Disp: 180 tablet, Rfl: 1  •  clopidogrel (PLAVIX) 75 MG tablet, Take 75 mg by mouth Daily., Disp: , Rfl:   •  diphenhydrAMINE (Benadryl Allergy) 25 MG tablet, Take one tablet po bid day before heart cath and one tab morning of cath, Disp: 3 tablet, Rfl: 0  •  DULoxetine (CYMBALTA) 60 MG capsule, Take 60 mg by mouth Daily., Disp: , Rfl:   •  Evolocumab with Infusor (Repatha Pushtronex System) solution cartridge, Inject 3.5  mL under the skin into the appropriate area as directed Every 30 (Thirty) Days., Disp: 1 cartridge., Rfl: 11  •  famotidine (Pepcid) 20 MG tablet, Take one tablet po bid day before heart cath and one tab morning of cath, Disp: 3 tablet, Rfl: 0  •  folic acid (FOLVITE) 1 MG tablet, Take 1 mg by mouth Daily., Disp: , Rfl:   •  furosemide (LASIX) 20 MG tablet, TAKE 1 TABLET BY MOUTH ONCE DAILY AS NEEDED (EDEMA), Disp: 90 tablet, Rfl: 0  •  isosorbide mononitrate (IMDUR) 60 MG 24 hr tablet, TAKE 1 TABLET BY MOUTH ONCE DAILY IN THE MORNING, Disp: 90 tablet, Rfl: 1  •  lansoprazole (PREVACID) 30 MG capsule, Take 30 mg by mouth Daily., Disp: , Rfl:   •  levothyroxine (SYNTHROID, LEVOTHROID) 25 MCG tablet, Take 25 mcg by mouth Daily., Disp: , Rfl:   •  metFORMIN ER (GLUCOPHAGE-XR) 500 MG 24 hr tablet, Take 500 mg by mouth Daily., Disp: , Rfl: 2  •  montelukast (SINGULAIR) 10 MG tablet, Take 1 tablet by mouth Every Night., Disp: 90 tablet, Rfl: 0  •  nicotine (NICODERM CQ) 14 MG/24HR patch, Place 1 patch on the skin as directed by provider Daily. Uses occas., Disp: , Rfl:   •  nitroglycerin (NITROSTAT) 0.4 MG SL tablet, Place 0.4 mg under the tongue Every 5 (Five) Minutes As Needed., Disp: , Rfl:   •  O2 (OXYGEN), Inhale 3 L/min 1 (One) Time., Disp: , Rfl:   •  olmesartan (BENICAR) 20 MG tablet, Take 1 tablet by mouth Daily., Disp: 90 tablet, Rfl: 3  •  pravastatin (PRAVACHOL) 40 MG tablet, Take 1 tablet by mouth once daily, Disp: 90 tablet, Rfl: 1  •  predniSONE (DELTASONE) 20 MG tablet, Take one tablet po bid day before heart cath and one tab morning of cath, Disp: 3 tablet, Rfl: 0  •  ranolazine (RANEXA) 1000 MG 12 hr tablet, Take 1 tablet by mouth Every 12 (Twelve) Hours., Disp: 180 tablet, Rfl: 3  •  tiotropium (SPIRIVA) 18 MCG per inhalation capsule, Place 1 capsule into inhaler and inhale Daily., Disp: , Rfl:   •  VENTOLIN  (90 Base) MCG/ACT inhaler, Inhale 2 puffs Every 6 (Six) Hours As Needed., Disp: , Rfl:  "  •  vitamin D3 125 MCG (5000 UT) capsule capsule, Take 5,000 Units by mouth Daily., Disp: , Rfl:   •  Vitamin E 180 MG (400 UNIT) capsule capsule, Take  by mouth Daily., Disp: , Rfl:   •  Zinc 50 MG capsule, Take 50 mg by mouth Every Night., Disp: , Rfl:     Allergies:   Allergies   Allergen Reactions   • Oxycodone Hcl GI Intolerance and Confusion   • Acetaminophen Irritability     Patient states she can take Ibuprofen, Toradol, and Demerol for pain management   • Atorvastatin Myalgia   • Codeine GI Intolerance   • Hydrocodone Bitartrate Er Itching and GI Intolerance   • Lortab [Hydrocodone-Acetaminophen] Itching and GI Intolerance     TABS   • Percocet [Oxycodone-Acetaminophen] GI Intolerance   • Propoxyphene GI Intolerance   • Protonix [Pantoprazole Sodium] GI Intolerance   • Bactrim [Sulfamethoxazole-Trimethoprim] Hives   • Clindamycin/Lincomycin Hives   • Contrast Dye Hives and Other (See Comments)     IVP DYE. Blisters on skin    • Penicillins Rash       Objective   Vital Signs:   Vitals:    07/26/22 1045   BP: 126/62   BP Location: Left arm   Patient Position: Sitting   Pulse: 87   SpO2: 94%   Weight: 88 kg (194 lb)   Height: 167.6 cm (66\")       PHYSICAL EXAM  General appearance: Awake, alert, cooperative  Head: Normocephalic, without obvious abnormality, atraumatic  Eyes: Conjunctivae/corneas clear, EOMs intact  Neck: no adenopathy, no carotid bruit, no JVD and thyroid: not enlarged  Lungs: clear to auscultation bilaterally and no rhonchi or crackles\", ' symmetric  Heart: regular rate and rhythm, S1, S2 normal, 2/6murmur, no click, rub or gallop  Abdomen: Soft, non-tender, bowel sounds normal,  no organomegaly  Extremities: extremities normal, atraumatic, no cyanosis or edema  Skin: Skin color, turgor normal, no rashes or lesions  Neurologic: Grossly normal     Lab Results   Component Value Date    GLUCOSE 88 04/08/2021    CALCIUM 9.2 04/08/2021     04/08/2021    K 4.7 04/08/2021    CO2 27.0 " 04/08/2021     04/08/2021    BUN 13 04/08/2021    CREATININE 0.40 (L) 04/08/2021    EGFRIFNONA 105 04/08/2021    BCR 22.4 04/08/2021    ANIONGAP 10.0 04/08/2021     Lab Results   Component Value Date    WBC 6.80 04/08/2021    HGB 11.5 (L) 04/08/2021    HCT 35.8 04/08/2021    MCV 93.7 04/08/2021     04/08/2021       Cardiac Testing:    CHADS-VASc Risk Assessment            5 Total Score    1 Hypertension    1 DM    1 Age 65-74    1 Sex: Female                  1      Vascular Disease    Criteria that do not apply:    CHF    Age >/= 75    PRIOR STROKE/TIA/THROMBO            Tobacco Cessation: N/A  Obstructive Sleep Apnea Screening: Deffered    Assessment & Plan    Diagnoses and all orders for this visit:    1. PSVT (paroxysmal supraventricular tachycardia) (HCC) (Primary)    2. Paroxysmal atrial fibrillation (HCC)    3. PVC (premature ventricular contraction)    4. Atrial tachycardia (HCC)         Diagnosis Plan   1. PSVT (paroxysmal supraventricular tachycardia) (HCC)     2. Paroxysmal atrial fibrillation (HCC)     3. PVC (premature ventricular contraction)     4. Atrial tachycardia (HCC)       Body mass index is 31.31 kg/m².    She presents with multiple cardiac issues today.  She is referred for evaluation of episodes of proximal atrial fibrillation as well as PVCs.  I reviewed her ambulatory monitor that was obtained in April.  There were 2 episodes that appear to be possible atrial fibrillation, however it is difficult to tell due to significant artifact on the tracings.  They unfortunately did not provide us with any longer tracings to assess.  I do think there is a high enough likelihood that these were true atrial fibrillation episodes it is reasonable to continue on Eliquis.  She also did have 8.3% PVCs on this monitor, as well as short runs of nonsustained VT.    As for her atrial fibrillation, she is overall not very symptomatic with this and her episodes are short and infrequent.  We discussed  different options including adding additional rhythm control medicines or even considering catheter ablation.  She is not very interested in adding additional medicines now, which I think is reasonable given her low burden of symptoms.  I also do not think she is a good candidate for catheter ablation at this time due to her significant pulmonary disease as well as significant aortic stenosis.    As for her PVCs that she has felt these for a long time and is overall not too bothered by them.  She is not interested in adding additional medications for this.  As for her nonsustained ventricular tachycardia, the longest runs on her monitor were approximately 6 beats.  She has a history of coronary disease, but does have a normal EF per echocardiogram.  In the setting of a normal ejection fraction, nonsustained ventricular tachycardia in the absence of syncope does not require further work-up.  Should she have worsening of her episodes or development of syncopal events, then this likely would indicate the need for an EP study and/or cardiac MRI for further evaluation.        I spent 62 minutes in consultation with this patient which included more than 65% of this time in direct face-to-face counseling, physical examination and discussion of my assessment and findings and shared decision making with the patient.  The remainder of the time not spent face to face was performing one, some or all of the following actions:  preparing to see this patient ( eg. Review of tests),  ordering medications, tests or procedures ), care coordination, discussion of the plan with other healthcare providers, documenting clinical information in Epic well as independently interpreting results and communicating results to patient, family and or caregiver.  All time noted occurred on the date of service.    Follow Up: 6 months        Thank you for allowing me to participate in the care of your patient. Please to not hesitate to contact me  with additional questions or concerns.          Scribed for Gil Mckeon MD Electronically signed by JANNA Black, 07/26/22, 12:00 PM EDT.

## 2022-07-26 ENCOUNTER — OFFICE VISIT (OUTPATIENT)
Dept: CARDIOLOGY | Facility: CLINIC | Age: 62
End: 2022-07-26

## 2022-07-26 VITALS
HEART RATE: 87 BPM | OXYGEN SATURATION: 94 % | BODY MASS INDEX: 31.18 KG/M2 | SYSTOLIC BLOOD PRESSURE: 126 MMHG | WEIGHT: 194 LBS | HEIGHT: 66 IN | DIASTOLIC BLOOD PRESSURE: 62 MMHG

## 2022-07-26 DIAGNOSIS — I47.1 ATRIAL TACHYCARDIA: ICD-10-CM

## 2022-07-26 DIAGNOSIS — I48.0 PAROXYSMAL ATRIAL FIBRILLATION: ICD-10-CM

## 2022-07-26 DIAGNOSIS — I49.3 PVC (PREMATURE VENTRICULAR CONTRACTION): ICD-10-CM

## 2022-07-26 DIAGNOSIS — I47.1 PSVT (PAROXYSMAL SUPRAVENTRICULAR TACHYCARDIA): Primary | ICD-10-CM

## 2022-07-26 PROBLEM — I47.10 PSVT (PAROXYSMAL SUPRAVENTRICULAR TACHYCARDIA): Status: ACTIVE | Noted: 2022-07-26

## 2022-07-26 PROBLEM — Z87.891 HISTORY OF TOBACCO ABUSE: Status: ACTIVE | Noted: 2019-12-12

## 2022-07-26 PROBLEM — G47.33 OSA TREATED WITH BIPAP: Status: ACTIVE | Noted: 2019-12-12

## 2022-07-26 PROCEDURE — 99215 OFFICE O/P EST HI 40 MIN: CPT | Performed by: STUDENT IN AN ORGANIZED HEALTH CARE EDUCATION/TRAINING PROGRAM

## 2022-07-28 DIAGNOSIS — I25.10 CORONARY ARTERY DISEASE INVOLVING NATIVE CORONARY ARTERY OF NATIVE HEART WITHOUT ANGINA PECTORIS: ICD-10-CM

## 2022-07-28 DIAGNOSIS — E78.00 PURE HYPERCHOLESTEROLEMIA: ICD-10-CM

## 2022-07-28 RX ORDER — PRAVASTATIN SODIUM 40 MG
40 TABLET ORAL DAILY
Qty: 90 TABLET | Refills: 3 | Status: SHIPPED | OUTPATIENT
Start: 2022-07-28

## 2022-08-08 ENCOUNTER — OFFICE VISIT (OUTPATIENT)
Dept: CARDIOLOGY | Facility: CLINIC | Age: 62
End: 2022-08-08

## 2022-08-08 VITALS
WEIGHT: 192 LBS | HEART RATE: 88 BPM | OXYGEN SATURATION: 94 % | SYSTOLIC BLOOD PRESSURE: 152 MMHG | HEIGHT: 66 IN | DIASTOLIC BLOOD PRESSURE: 72 MMHG | BODY MASS INDEX: 30.86 KG/M2

## 2022-08-08 DIAGNOSIS — R04.0 EPISTAXIS: ICD-10-CM

## 2022-08-08 DIAGNOSIS — R07.9 CHEST PAIN, UNSPECIFIED TYPE: ICD-10-CM

## 2022-08-08 DIAGNOSIS — I35.0 AORTIC STENOSIS, MODERATE: ICD-10-CM

## 2022-08-08 DIAGNOSIS — I25.10 CORONARY ARTERY DISEASE INVOLVING NATIVE CORONARY ARTERY OF NATIVE HEART WITHOUT ANGINA PECTORIS: Primary | ICD-10-CM

## 2022-08-08 DIAGNOSIS — I48.0 PAROXYSMAL ATRIAL FIBRILLATION: ICD-10-CM

## 2022-08-08 PROCEDURE — 99214 OFFICE O/P EST MOD 30 MIN: CPT | Performed by: PHYSICIAN ASSISTANT

## 2022-08-08 NOTE — PROGRESS NOTES
Problem list     Subjective   Ira Stinson is a 62 y.o. female     Chief Complaint   Patient presents with   • Paroxysmal atrial fibrillation     PROBLEM LIST:      1. Palpitations  a. Long history of chronic palpitations with multiple cardiac monitors placed in the past revealing short runs of SVT felt to be atrial tachycardia and frequent PVCs  b. 48-hour cardiac monitor placed 4/15/2012 with predominant normal sinus rhythm HR 54-98, AVG 72 bpm with noted PACs and PVCs with one 7 beat run SVT at 135 bpm  c. 48-hour cardiac monitor placed 1/20/2021 for palpitations with predominant normal sinus rhythm HR , AVG 74 bpm with 2 brief runs of SVT felt to be atrial tachycardia and noted PACs/PVCs with 1% VE/SVE burden and referral to EP for further evaluation.  d. Current event monitor May 2022 with frequent premature beats, run of nonsustained V. tach, paroxysmal A. fib identified.  Patient currently on Eliquis for anticoagulation  2. Coronary artery disease  a. Long history with multiple stents and CABG in the past  b. [facility] May 2014 with PTCA for IntraStent restenosis   c. Repeat catheterization in July 2014 due to new 70-80% stenosis with angioplasty, subsequent dissection and stenting. Recommendations to consider single vessel bypass due to high grade stenosis.  d. Coronary artery bypass grafting 2014  e. Follow-up catheterization in November 2016 demonstrated high-grade disease involving the vein graft to the circumflex as well as the arterial graft to the PDA. There was high-grade disease of the native right coronary artery. Patient underwent stenting of the vein graft to circumflex, PTCA and stenting of the vein graft to the right coronary artery and stenting of the native right coronary artery.  f. [facility] October 2019 demonstrated high-grade RCA disease.  There was subtotal IntraStent restenosis as well as distal disease.  Patient underwent stenting x2.  Vein graft to circumflex was patent as well as LIMA.   Occluded native circumflex.  Medical management recommended.  g. Echo 8/19/2019 EF 61-65%, no significant VHD, LA 4.4 cm  h. LHC August 2020 demonstrated patent LIMA to LAD and patent vein graft to circumflex with significant native vessel disease.  Patient had occluded RCA at the acute margin.  Vein graft RCA was treated.  Because of multiple interventions in the RCA, medical management was recommended.  i. LHC 4/8/2021 per Dr. Milton with documented three-vessel CAD W/2 out of 3 patent bypass grafts,  RCA and SVC to RCA with good collateral flow, and small vessel disease of LAD diagonal secondary branch with medical therapy recommended.  j. Stress test May 2022 demonstrated a large inferior and inferolateral wall ischemic defect with preserved LV function  k. Left heart catheterization May 2022 by Dr. Vigil with stenting of the vein graft to circumflex/obtuse marginal.  Patient had patent LIMA to LAD and totally occluded RCA and vein graft RCA.  Medical management recommended otherwise  3. Carotid artery disease  a. S/P left carotid endarterectomy and follow-up right endarterectomy, March 2011.  b. Documented residual nonobstructive left carotid stenosis.  c. Ccarotid duplex demonstrated 50 to 70% proximal right internal carotid artery stenosis  d. Carotid stenting per Dr. Avilez to right ICA  4. Aortic stenosis  4.1 echocardiogram June 2022 with Parameters including a peak instantaneous gradient of 34, mean gradient of 22, and an aortic valve area of 1.2 cm²  5. Dyslipidemia  6. Hypothyroidism  7. Supplemental oxygen therapy  a. Patient on continuous home oxygen since pneumonia March 2020 suspected to be Covid 5-day hospital stay.  8. AMBER       HPI    Patient is a 62-year-old female who presents to the office for follow-up.  In short, patient has extensive problem list but has history of coronary artery disease.  Her most recent catheterization was in May 2022 in which she underwent stenting of the saphenous  vein graft to the obtuse marginal.  She has history of preserved LV function.  She had recent event monitor demonstrating paroxysmal A. fib as well as short run of nonsustained V. tach.  She has moderate to severe aortic stenosis as well and has been managed and monitored medically at this point.    Patient has done well.  She has occasional slight chest discomfort but nothing that she feels has been progressive.  She will feel occasional pain near the substernal region at rest and then it resolves.  It is not severe like what she felt previous to stenting.  Her dyspnea is moderate at baseline and she is on oxygen continuously.  She does not describe PND orthopnea.    Patient occasionally palpitates.  It is stable.  She does not describe any dizziness, presyncope or syncope.  She will occasionally notice a fluttering sensation.  Otherwise she is stable      Current Outpatient Medications on File Prior to Visit   Medication Sig Dispense Refill   • albuterol (PROVENTIL) (2.5 MG/3ML) 0.083% nebulizer solution Take 2.5 mg by nebulization Every 4 (Four) Hours As Needed for Wheezing.     • ALPRAZolam (XANAX) 1 MG tablet Take 1 mg by mouth 2 (Two) Times a Day.  1   • apixaban (ELIQUIS) 5 MG tablet tablet Take 1 tablet by mouth 2 (Two) Times a Day. 60 tablet 5   • Ascorbic Acid (VITAMIN C PO) Take 1,000 mg by mouth Every Morning. CHEWABLES     • budesonide (PULMICORT) 0.5 MG/2ML nebulizer solution Take 0.5 mg by nebulization As Needed.     • budesonide-formoterol (SYMBICORT) 160-4.5 MCG/ACT inhaler Inhale 2 puffs 2 (Two) Times a Day.     • carvedilol (COREG) 6.25 MG tablet TAKE 1 TABLET BY MOUTH TWICE DAILY WITH MEALS 180 tablet 1   • clopidogrel (PLAVIX) 75 MG tablet Take 75 mg by mouth Daily.     • DULoxetine (CYMBALTA) 60 MG capsule Take 60 mg by mouth Daily.     • Evolocumab with Infusor (Repatha Pushtronex System) solution cartridge Inject 3.5 mL under the skin into the appropriate area as directed Every 30 (Thirty)  Days. 1 cartridge. 11   • famotidine (Pepcid) 20 MG tablet Take one tablet po bid day before heart cath and one tab morning of cath 3 tablet 0   • folic acid (FOLVITE) 1 MG tablet Take 1 mg by mouth Daily.     • furosemide (LASIX) 20 MG tablet TAKE 1 TABLET BY MOUTH ONCE DAILY AS NEEDED (EDEMA) 90 tablet 0   • isosorbide mononitrate (IMDUR) 60 MG 24 hr tablet TAKE 1 TABLET BY MOUTH ONCE DAILY IN THE MORNING 90 tablet 1   • lansoprazole (PREVACID) 30 MG capsule Take 30 mg by mouth Daily.     • levothyroxine (SYNTHROID, LEVOTHROID) 25 MCG tablet Take 25 mcg by mouth Daily.     • metFORMIN ER (GLUCOPHAGE-XR) 500 MG 24 hr tablet Take 500 mg by mouth Daily.  2   • montelukast (SINGULAIR) 10 MG tablet Take 1 tablet by mouth Every Night. 90 tablet 0   • nitroglycerin (NITROSTAT) 0.4 MG SL tablet Place 0.4 mg under the tongue Every 5 (Five) Minutes As Needed.     • O2 (OXYGEN) Inhale 3 L/min 1 (One) Time.     • olmesartan (BENICAR) 20 MG tablet Take 1 tablet by mouth Daily. 90 tablet 3   • pravastatin (PRAVACHOL) 40 MG tablet Take 1 tablet by mouth Daily. 90 tablet 3   • ranolazine (RANEXA) 1000 MG 12 hr tablet Take 1 tablet by mouth Every 12 (Twelve) Hours. 180 tablet 3   • tiotropium (SPIRIVA) 18 MCG per inhalation capsule Place 1 capsule into inhaler and inhale Daily.     • VENTOLIN  (90 Base) MCG/ACT inhaler Inhale 2 puffs Every 6 (Six) Hours As Needed.     • vitamin D3 125 MCG (5000 UT) capsule capsule Take 5,000 Units by mouth Daily.     • Vitamin E 180 MG (400 UNIT) capsule capsule Take  by mouth Daily.     • Zinc 50 MG capsule Take 50 mg by mouth Every Night.     • nicotine (NICODERM CQ) 14 MG/24HR patch Place 1 patch on the skin as directed by provider Daily. Uses occas.     • predniSONE (DELTASONE) 20 MG tablet Take one tablet po bid day before heart cath and one tab morning of cath 3 tablet 0   • [DISCONTINUED] diphenhydrAMINE (Benadryl Allergy) 25 MG tablet Take one tablet po bid day before heart cath and  one tab morning of cath 3 tablet 0     No current facility-administered medications on file prior to visit.       Oxycodone hcl, Acetaminophen, Atorvastatin, Codeine, Hydrocodone bitartrate er, Lortab [hydrocodone-acetaminophen], Percocet [oxycodone-acetaminophen], Propoxyphene, Protonix [pantoprazole sodium], Bactrim [sulfamethoxazole-trimethoprim], Clindamycin/lincomycin, Contrast dye, and Penicillins    Past Medical History:   Diagnosis Date   • Anxiety    • Arthritis    • Autoimmune disorder (HCC)    • COPD (chronic obstructive pulmonary disease) (HCC)    • Depression    • Diabetes (HCC)     type 2 dm, check blood sugar 2 to 3 times a week   • GERD (gastroesophageal reflux disease)    • Goiter    • Hiatal hernia    • History of transfusion    • Hyperlipidemia    • Hypothyroidism    • Iron deficiency anemia    • Lichen planus    • PONV (postoperative nausea and vomiting)     severe   • PVC (premature ventricular contraction)    • Wears dentures     upper   • Wears reading eyeglasses        Social History     Socioeconomic History   • Marital status:    • Number of children: 2   Tobacco Use   • Smoking status: Former Smoker     Packs/day: 0.25     Years: 45.00     Pack years: 11.25     Types: Cigarettes     Quit date: 2022     Years since quittin.2   • Smokeless tobacco: Never Used   Vaping Use   • Vaping Use: Never used   Substance and Sexual Activity   • Alcohol use: No   • Drug use: No   • Sexual activity: Defer       Family History   Problem Relation Age of Onset   • Coronary artery disease Other    • Hyperlipidemia Other    • Hypertension Other    • Cancer Other    • Heart disease Mother    • Heart disease Father    • Heart attack Brother    • Cancer Sister        Review of Systems   Constitutional: Negative.  Negative for chills and fever.   HENT: Positive for nosebleeds. Negative for congestion and sinus pressure.    Respiratory: Positive for apnea and shortness of breath (uses oxygen).  "Negative for chest tightness.    Cardiovascular: Positive for chest pain, palpitations (races, flutters) and leg swelling.   Gastrointestinal: Negative for blood in stool.   Endocrine: Negative.  Negative for cold intolerance and heat intolerance.   Genitourinary: Negative.  Negative for hematuria.   Neurological: Positive for dizziness (with position change ). Negative for syncope and light-headedness.   Psychiatric/Behavioral: Positive for sleep disturbance (bipap and oxygen, denies waking with soa or cp).       Objective   Vitals:    08/08/22 1041   BP: 152/72   BP Location: Left arm   Patient Position: Sitting   Pulse: 88   SpO2: 94%   Weight: 87.1 kg (192 lb)   Height: 167.6 cm (66\")      /72 (BP Location: Left arm, Patient Position: Sitting)   Pulse 88   Ht 167.6 cm (66\")   Wt 87.1 kg (192 lb)   SpO2 94% Comment: O2 @ 2 LPM pulsate  BMI 30.99 kg/m²     Lab Results (most recent)     None          Physical Exam  Vitals and nursing note reviewed.   Constitutional:       General: She is not in acute distress.     Appearance: Normal appearance. She is well-developed.   HENT:      Head: Normocephalic and atraumatic.   Eyes:      General: No scleral icterus.        Right eye: No discharge.         Left eye: No discharge.      Conjunctiva/sclera: Conjunctivae normal.   Neck:      Vascular: Carotid bruit present.   Cardiovascular:      Rate and Rhythm: Normal rate and regular rhythm.      Heart sounds: Normal heart sounds. No murmur heard.    No friction rub. No gallop.      Comments: Grade 1/6 to 2/6 systolic ejection murmur upper sternal border.  Grade 1/6 systolic murmur entirety of left sternal border  Pulmonary:      Effort: Pulmonary effort is normal. No respiratory distress.      Breath sounds: Normal breath sounds. No wheezing or rales.   Chest:      Chest wall: No tenderness.   Musculoskeletal:      Right lower leg: No edema.      Left lower leg: No edema.   Skin:     General: Skin is warm and dry. "      Coloration: Skin is not pale.      Findings: No erythema or rash.   Neurological:      Mental Status: She is alert and oriented to person, place, and time.      Cranial Nerves: No cranial nerve deficit.   Psychiatric:         Behavior: Behavior normal.         Procedure   Procedures       Assessment & Plan     Problems Addressed this Visit        Cardiac and Vasculature    Coronary artery disease involving native coronary artery of native heart without angina pectoris - Primary    Paroxysmal atrial fibrillation (HCC)    Aortic stenosis, moderate    Chest pain       ENT    Epistaxis      Diagnoses       Codes Comments    Coronary artery disease involving native coronary artery of native heart without angina pectoris    -  Primary ICD-10-CM: I25.10  ICD-9-CM: 414.01     Chest pain, unspecified type     ICD-10-CM: R07.9  ICD-9-CM: 786.50     Aortic stenosis, moderate     ICD-10-CM: I35.0  ICD-9-CM: 424.1     Paroxysmal atrial fibrillation (HCC)     ICD-10-CM: I48.0  ICD-9-CM: 427.31     Epistaxis     ICD-10-CM: R04.0  ICD-9-CM: 784.7           Recommendation  1.  Patient is a 62-year-old female who presents to the office for evaluation.  Patient has history of coronary disease with no symptoms of angina.  She has an atypical pain in the substernal region but does not feel similar to what she felt previous to stenting.  For now, we will monitor symptoms as she recently had stenting of the vein graft to OM    2.  Aortic stenosis appears to be moderate at this time with a valve area 1.2 and a mean gradient in the 20s.  For now, we will monitor routinely with echocardiograms    3.  Patient with paroxysmal atrial fibrillation and recently seen EP services.  They discussed options but she wants to continue as she is for now.    4.  Otherwise, she does not describe any bleeding on Eliquis although she did have a slight nosebleed since starting.  I discussed referral to ENT.  For now, she would rather monitor.  I did  discuss with her that if her epistaxis got worse, to let us know.  Otherwise we will see her back for follow-up in 6 months or sooner as symptoms discussed.  She is to follow with primary as scheduled           Ira Stinson  reports that she quit smoking about 3 months ago. Her smoking use included cigarettes. She has a 11.25 pack-year smoking history. She has never used smokeless tobacco..    Patient did not bring med list or medicine bottles to appointment, med list has been reviewed and updated based on patient's knowledge of their meds.     Electronically signed by:

## 2022-08-15 RX ORDER — ISOSORBIDE MONONITRATE 60 MG/1
TABLET, EXTENDED RELEASE ORAL
Qty: 90 TABLET | Refills: 0 | Status: SHIPPED | OUTPATIENT
Start: 2022-08-15 | End: 2022-10-26

## 2022-09-27 RX ORDER — NITROGLYCERIN 0.4 MG/1
0.4 TABLET SUBLINGUAL
Qty: 30 TABLET | Refills: 3 | Status: SHIPPED | OUTPATIENT
Start: 2022-09-27

## 2022-09-27 NOTE — TELEPHONE ENCOUNTER
Patient called requesting a refill of nitro. If okay please sign pended order.    Chioma Ordoñez MA

## 2022-10-26 DIAGNOSIS — I10 ESSENTIAL HYPERTENSION: ICD-10-CM

## 2022-10-26 RX ORDER — CARVEDILOL 6.25 MG/1
TABLET ORAL
Qty: 180 TABLET | Refills: 0 | Status: SHIPPED | OUTPATIENT
Start: 2022-10-26 | End: 2023-01-23

## 2022-10-26 RX ORDER — ISOSORBIDE MONONITRATE 60 MG/1
TABLET, EXTENDED RELEASE ORAL
Qty: 90 TABLET | Refills: 0 | Status: SHIPPED | OUTPATIENT
Start: 2022-10-26 | End: 2023-01-23

## 2022-11-21 RX ORDER — EVOLOCUMAB 420 MG/3.5
KIT SUBCUTANEOUS
Qty: 10.5 ML | Refills: 3 | Status: SHIPPED | OUTPATIENT
Start: 2022-11-21

## 2022-11-30 ENCOUNTER — TELEPHONE (OUTPATIENT)
Dept: CARDIOLOGY | Facility: CLINIC | Age: 62
End: 2022-11-30

## 2022-11-30 RX ORDER — OLMESARTAN MEDOXOMIL AND HYDROCHLOROTHIAZIDE 20/12.5 20; 12.5 MG/1; MG/1
1 TABLET ORAL DAILY
Qty: 30 TABLET | Refills: 11 | Status: SHIPPED | OUTPATIENT
Start: 2022-11-30 | End: 2022-12-01 | Stop reason: SDUPTHER

## 2022-11-30 NOTE — TELEPHONE ENCOUNTER
Patient reports problem started shortly after starting Olmesartan 20 mg when she noticed she was staying short of breath due to fluid retention. Blood pressure normal at 124/72 HR 90.    Hardik Victor PA Cheek, Laura Anne, MA  Caller: Unspecified (Today, 12:32 PM)  We can try the Benicar with the hydrochlorothiazide.  That should help her urination and help her edema more.  Can we see if she would be willing to try that?  If she is, I want her to call back in 1 week and let us know how she is doing     Confirmed with Hardik Victor PA-C to order Benicar HCT 20-12.5. Left detailed message for patient.

## 2022-11-30 NOTE — TELEPHONE ENCOUNTER
Caller: Ira Stinson    Relationship: Self    Best call back number: [phone]    What is the best time to reach you: ANY TIME BEFORE 3     Who are you requesting to speak with (clinical staff, provider,  specific staff member): ANY    What was the call regarding: MEDICATION    Do you require a callback: MICK    PT HAS BEEN TAKING OLMERARTAN FOR ABOUT 2-3 MONTHS WHICH WAS PRESCRIBED BY A ER DOCTOR. FOR ABOUT A MONTH PT HAS BEEN USING THE RESTROOM EVERY 15 MINUTES BUT ITS ONLY A LITTLE DRIZZLE AND HER RIGHT ANKLE HAS BEEN SWELLING THROUGHOUT THE DAY. SHE BELIEVES IT COULD BE THE MEDICATION.

## 2022-12-01 DIAGNOSIS — I10 ESSENTIAL HYPERTENSION: Primary | ICD-10-CM

## 2022-12-01 RX ORDER — OLMESARTAN MEDOXOMIL AND HYDROCHLOROTHIAZIDE 20/12.5 20; 12.5 MG/1; MG/1
1 TABLET ORAL DAILY
Qty: 30 TABLET | Refills: 11 | Status: SHIPPED | OUTPATIENT
Start: 2022-12-01 | End: 2023-12-01

## 2022-12-01 NOTE — TELEPHONE ENCOUNTER
Caller: Ira Stinson    Relationship: Self    Best call back number: [phone]    Requested Prescriptions:   Requested Prescriptions     Pending Prescriptions Disp Refills   • olmesartan-hydrochlorothiazide (BENICAR HCT) 20-12.5 MG per tablet 30 tablet 11     Sig: Take 1 tablet by mouth Daily.        Pharmacy where request should be sent: [facility] [address] [place] [address] [phone] [facility] [phone] [facility]     Additional details provided by patient: SENT TO WRONG PHARMACY    Does the patient have less than a 3 day supply:  [x] Yes  [] No    Would you like a call back once the refill request has been completed: [x] Yes [] No    If the office needs to give you a call back, can they leave a voicemail: [x] Yes [] No    Hanane Vu Rep   12/01/22 12:42 EST

## 2022-12-01 NOTE — TELEPHONE ENCOUNTER
olmesartan-hydrochlorothiazide (BENICAR HCT) 20-12.5 MG per tablet           Possible duplicate: [place] to review recent actions on this medication    Sig: Take 1 tablet by mouth Daily.    Disp:  30 tablet    Refills:  11    Start: 12/1/2022 - 12/1/2023    Class: Normal    Last ordered: Yesterday by JANNA Davies     ARB Protocol Failed 12/01/2022 12:43 PM   Protocol Details  Normal serum potassium on file within the past year    GFR > 30 in past year    No active pregnancy on record    No positive pregnancy test in past 12 months    BP under 130/80 in past year and patient has diabetes, CAD or PVD    Patient is not on Entresto    Patient is not on concurrent ACE    Visit with authorizing provider in past 12 months or upcoming 30 days

## 2022-12-05 ENCOUNTER — PRIOR AUTHORIZATION (OUTPATIENT)
Dept: CARDIOLOGY | Facility: CLINIC | Age: 62
End: 2022-12-05

## 2022-12-05 NOTE — TELEPHONE ENCOUNTER
Prior authorization request received for Repatha. Authorization submitted through Cover My Meds. Status pending.      PA Case: [id number], Status: Approved, Coverage Starts on: 1/1/2022 12:00:00 AM, Coverage Ends on: 12/31/2023

## 2022-12-12 ENCOUNTER — TELEPHONE (OUTPATIENT)
Dept: CARDIOLOGY | Facility: CLINIC | Age: 62
End: 2022-12-12

## 2022-12-12 NOTE — TELEPHONE ENCOUNTER
Caller: Ira Stinson    Relationship: Self    Best call back number: [phone]    What was the call regarding: PT CALLED TO REPORT THAT THE NEW MEDICINE THAT XIMENA PUT HER ON OLMESARTAN-HYDROCHLOROTHIAZIDE 12.5 MG IS WORKING FOR HER - SHE HAS HAD LESS SWELLING AND HASN'T HAD TO TAKE DIURETIC     Do you require a callback: IF NECESSARY

## 2022-12-27 RX ORDER — APIXABAN 5 MG/1
TABLET, FILM COATED ORAL
Qty: 60 TABLET | Refills: 0 | Status: SHIPPED | OUTPATIENT
Start: 2022-12-27 | End: 2023-01-26

## 2023-01-23 DIAGNOSIS — I10 ESSENTIAL HYPERTENSION: ICD-10-CM

## 2023-01-23 RX ORDER — ISOSORBIDE MONONITRATE 60 MG/1
TABLET, EXTENDED RELEASE ORAL
Qty: 90 TABLET | Refills: 0 | Status: SHIPPED | OUTPATIENT
Start: 2023-01-23

## 2023-01-23 RX ORDER — CARVEDILOL 6.25 MG/1
TABLET ORAL
Qty: 180 TABLET | Refills: 0 | Status: SHIPPED | OUTPATIENT
Start: 2023-01-23

## 2023-01-26 RX ORDER — APIXABAN 5 MG/1
TABLET, FILM COATED ORAL
Qty: 60 TABLET | Refills: 0 | Status: SHIPPED | OUTPATIENT
Start: 2023-01-26 | End: 2023-03-06

## 2023-02-21 RX ORDER — RANOLAZINE 1000 MG/1
TABLET, EXTENDED RELEASE ORAL
Qty: 180 TABLET | Refills: 0 | Status: SHIPPED | OUTPATIENT
Start: 2023-02-21

## 2023-03-06 RX ORDER — APIXABAN 5 MG/1
TABLET, FILM COATED ORAL
Qty: 60 TABLET | Refills: 0 | Status: SHIPPED | OUTPATIENT
Start: 2023-03-06

## 2024-11-22 NOTE — TELEPHONE ENCOUNTER
STRESS TEST RESULTS REVIEWED WITH DR. CERVANTES, NEEDS 1-2 WEEK F/U APPT. PATIENT NOTIFIED STRESS TEST ABNL. AND WILL BE EXPECTING A  CALL FROM PAR STAFF WITH AN APPT. NEXT WEEK WITH NALLELY FAY IF POSSIBLE DUE TO HER REQUEST. MESSAGE GIVEN TO GAIL DELEON TO SCHEDULE. LAUREN ORTIZ            ----- Message from Carlos Cervantes MD sent at 5/12/2022 12:44 PM EDT -----  Needs 1-2 week f/u APPT.   ----- Message -----  From: Carlos Cervantes MD  Sent: 5/10/2022   1:04 PM EDT  To: Carlos Cervantes MD         Warm/Dry

## (undated) DEVICE — DEV COMP RAD PRELUDESYNC 24CM

## (undated) DEVICE — CATH F4 INF IM 100CM: Brand: INFINITI

## (undated) DEVICE — CATH TEMPO 5F BER 100CM: Brand: TEMPO

## (undated) DEVICE — INTRO SHEATH FLX 7F80CM

## (undated) DEVICE — EMBOSHIELD NAV6 EMBOLIC PROTECTION SYSTEM 7.2 MM X 190 CM: Brand: EMBOSHIELD  NAV6

## (undated) DEVICE — LEX NEURO ANGIOGRAPHY: Brand: MEDLINE INDUSTRIES, INC.

## (undated) DEVICE — RADIFOCUS GLIDEWIRE: Brand: GLIDEWIRE

## (undated) DEVICE — LIMB HOLDER, WRIST/ANKLE: Brand: DEROYAL

## (undated) DEVICE — ANGIO-SEAL VIP VASCULAR CLOSURE DEVICE: Brand: ANGIO-SEAL

## (undated) DEVICE — ST ACC MICROPUNCTURE .018 TRANSLSS/SS/TP 5F/10CM 21G/7CM

## (undated) DEVICE — CATH MPAC INF F4 JR4/JL4/PIG: Brand: INFINITI

## (undated) DEVICE — CATH DIAG INFINITI AL1 4F 100CM

## (undated) DEVICE — GW J TP FIX CORE .035 150

## (undated) DEVICE — MODEL BT2000 P/N 700287-012KIT CONTENTS: MANIFOLD WITH SALINE AND CONTRAST PORTS, SALINE TUBING WITH SPIKE AND HAND SYRINGE, TRANSDUCER: Brand: BT2000 AUTOMATED MANIFOLD KIT

## (undated) DEVICE — PINNACLE INTRODUCER SHEATH: Brand: PINNACLE

## (undated) DEVICE — PK CATH CARD 10

## (undated) DEVICE — ROTATING HEMOSTATIC VALVE .096": Brand: RHV

## (undated) DEVICE — MODEL AT P65, P/N 701554-001KIT CONTENTS: HAND CONTROLLER, 3-WAY HIGH-PRESSURE STOPCOCK WITH ROTATING END AND PREMIUM HIGH-PRESSURE TUBING: Brand: ANGIOTOUCH® KIT

## (undated) DEVICE — AVANTI + 4F STD W/GW: Brand: AVANTI

## (undated) DEVICE — Device